# Patient Record
Sex: MALE | Race: BLACK OR AFRICAN AMERICAN | Employment: OTHER | URBAN - METROPOLITAN AREA
[De-identification: names, ages, dates, MRNs, and addresses within clinical notes are randomized per-mention and may not be internally consistent; named-entity substitution may affect disease eponyms.]

---

## 2018-07-11 ENCOUNTER — HOSPITAL ENCOUNTER (OUTPATIENT)
Dept: SPEECH THERAPY | Age: 43
Discharge: HOME | End: 2018-07-11
Attending: PEDIATRICS | Admitting: PEDIATRICS

## 2018-07-11 DIAGNOSIS — R13.10 PROBLEMS WITH SWALLOWING AND MASTICATION: ICD-10-CM

## 2018-07-11 NOTE — PROCEDURES
INSTRUMENTAL SWALLOW REPORT  MODIFIED BARIUM SWALLOW    NAME: Jason Yousif   : 1975  MRN: [id number]       Date of Eval: 2018     Ordering Physician: Dr. Monica Loyola  Radiologist: Dr. Ginette Bustamante     Referring Diagnosis(es): Referring Diagnosis: Dysphagia, unspecified type (R13.10)    Past Medical History:  has a past medical history of Acne; Allergic rhinitis; Blindness, legal; Constipation; Dehydration; Dermatitis; Diabetes mellitus ([address] Epilepsy ([address] GERD (gastroesophageal reflux disease); GERD (gastroesophageal reflux disease); Hearing loss; Hypokalemia; Hypothyroidism; Impulse control disorder; Incontinence; Insomnia; Mental retardation; Mental retardation, profound (I.Q. < 20); Microcephaly ([facility] [address] MRSA (methicillin resistant staph aureus) culture positive; Non-verbal learning disorder; PAD (peripheral artery disease) ([facility] [address] Pneumonia; Scoliosis; and Thyroid disease. Past Surgical History:  has a past surgical history that includes chest tube insertion and other surgical history (Right, 1-7-16). Current Diet Solid Consistency: Dysphagia I Pureed  Current Diet Liquid Consistency: Nectar    Date of Prior Study: 12  Type of Study: Repeat MBS  Results of Prior Study: Dysphagia I (puree) diet with nectar-thick liquids recommended; aspiration with thin liquid observed. Recent CXR/CT of Chest: n/a    Patient Complaints/Reason for Referral:  Jason Yousif was referred for a MBS to assess the efficiency of his/her swallow function, assess for aspiration, and to make recommendations regarding safe dietary consistencies, effective compensatory strategies, and safe eating environment. Patient complaints: n/a; per pt's caregiver, pt tolerating current diet, however, \"has been having more seizures lately\".     Pain   Patient Currently in Pain: Unable to Assess (Pt nonverbal; no indicators of pain during study    General Comment: Pt with hx of epilepsy, blindness, profound MR, PNA, and GERD per chart. Recommended Diet:  Solid consistency: Dysphagia I Pureed (Recommend trialing mechanical soft consistencies with SLP only)  Liquid consistency: Nectar  Liquid administration via: Spoon; Only tsp trials able to be assessed this date  Medication administration: Meds in puree    Impressions:  Treatment Dx and ICD 10: Dysphagia, unspecified type (R13.10)  Pt demonstrates moderate-severe pharyngeal dysphagia  · Pt's oral deficits characterized by reduced bolus control and weak lingual manipulation, resulting in premature bolus loss to the pharynx with all consistencies trialed. Pt with minimal-absent rotary chew pattern with regular solid trial.  Pt may benefit from trialing mechanical soft consistencies at bedside with SLP only for possible diet upgrade. · Pt's pharyngeal deficits characterized by delayed swallow initiation, reduced laryngeal elevation, and reduced anterior laryngeal movement with premature spillage to the pyriforms noted with nectar and honey-thick liquid trials via tsp (x1 each). No penetration/aspiration observed with any consistencies trialed. Minimal-no residue noted post-swallow with any consistencies trialed. · Overall limited study 2/2 pt cooperation and minimal PO trials able to be observed. Pt with frequent movement throughout study despite tactile / verbal cueing from SLP. Pt did not accept liquid trials from cup or straw, pushing both away from SLP despite multiple attempts / encouragement. Pt will require total feed assistance and adherence to strict aspiration precautions. Oral Preparation / Oral Phase  Impaired  Oral Phase - Major Contributing Deficits  · Poor Mastication: Reg solid; Soft solid  · Weak Lingual Manipulation: All  · Reduced Bolus Control: All  · Premature Bolus Loss to Pharynx: All    Pharyngeal Phase  Impaired  Pharyngeal Phase - Major Contributing Deficits  · Delayed Swallow Initiation: All  · Premature Spillage to Valleculae:  All  · Premature Spillage Yes  Type of devices:  (Pt left MBSS in no distress; left with caregiver)    Goals: To be determined by treating SLP    G-Code:  SLP G-Codes  Functional Limitations: Swallowing  Swallow Current Status (): At least 60 percent but less than 80 percent impaired, limited or restricted  Swallow Goal Status (): At least 60 percent but less than 80 percent impaired, limited or restricted  Swallow Discharge Status ():  At least 60 percent but less than 80 percent impaired, limited or restricted      Therapy Time:   Individual Concurrent Group Co-treatment   Time In 1035         Time Out 1100         Minutes 25             25 minutes: MBSS procedure and dysphagia tx    Joanna Salazar M.S. [id number] [facility]  Speech-language pathologist  [id number]

## 2018-07-11 NOTE — COMMUNICATION BODY
INSTRUMENTAL SWALLOW REPORT  MODIFIED BARIUM SWALLOW     NAME: Dino Madrid                                  : 1975  MRN: [id number]                                  Date of Eval: 2018                                   Ordering Physician: Dr. Darryl Cortez  Radiologist: Dr. Nithin Velez  Referring Diagnosis(es): Referring Diagnosis: Dysphagia, unspecified type (R13.10)     Past Medical History:  has a past medical history of Acne; Allergic rhinitis; Blindness, legal; Constipation; Dehydration; Dermatitis; Diabetes mellitus ([facility] [address] Epilepsy ([facility] [address] GERD (gastroesophageal reflux disease); GERD (gastroesophageal reflux disease); Hearing loss; Hypokalemia; Hypothyroidism; Impulse control disorder; Incontinence; Insomnia; Mental retardation; Mental retardation, profound (I.Q. < 20); Microcephaly ([facility] [address] MRSA (methicillin resistant staph aureus) culture positive; Non-verbal learning disorder; PAD (peripheral artery disease) ([facility] [address] Pneumonia; Scoliosis; and Thyroid disease. Past Surgical History:  has a past surgical history that includes chest tube insertion and other surgical history (Right, 1-7-16).    Current Diet Solid Consistency: Dysphagia I Pureed  Current Diet Liquid Consistency: Nectar     Date of Prior Study: 12  Type of Study: Repeat MBS  Results of Prior Study: Dysphagia I (puree) diet with nectar-thick liquids recommended; aspiration with thin liquid observed.     Recent CXR/CT of Chest: n/a     Patient Complaints/Reason for Referral:  Dino Madrid was referred for a MBS to assess the efficiency of his/her swallow function, assess for aspiration, and to make recommendations regarding safe dietary consistencies, effective compensatory strategies, and safe eating environment.   Patient complaints: n/a; per pt's caregiver, pt tolerating current diet, however, \"has been having more seizures lately\".     Pain   Patient Currently in Pain: Unable to Assess (Pt nonverbal; no indicators of pain during Deficits  · Delayed Swallow Initiation: All  · Premature Spillage to Valleculae: All  · Premature Spillage to Pyriform: Nectar teaspoon;Honey teaspoon  · Reduced Laryngeal Elevation: All  · Reduced Anterior Laryngeal Movement: All     Esophageal Phase  Appears WFL when viewed at the cervical level throughout the duration of the study     Patient Position: Lateral and Patient Degrees: Pt seated upright in wheelchair  Consistencies Administered: Soft solid;Reg solid;Puree;Nectar  teaspoon;Honey teaspoon     Dysphagia Outcome Severity Scale: Level 3: Moderate dysphagia- Total assisstance, supervision or strategies. Two or more diet consistencies restricted  Penetration-Aspiration Scale (PAS): 1 - Material does not enter the airway     Behavior/Cognition/Vision/Hearing:  Behavior/Cognition: Alert;Agitated; Impulsive; Uncooperative; Requires cueing;Doesn't follow directions  Vision: Impaired  Vision Exceptions: Legally blind (\"Blindness\" per chart)  Hearing: Exceptions to [facility] (MAKENZIE; pt unable to follow commands for SLP, nonverbal)     Safe Swallow Protocol:  Supervision: 1:1  Compensatory Swallowing Strategies: Total feed;Remain upright for 30-45 minutes after meals;Upright as possible for all oral intake;Eat/Feed slowly; Small bites/sips     Recommendations/Treatment  Requires SLP Intervention: Yes  D/C Recommendations: 24 hour supervision/assistance  Recommended Exercises:    Therapeutic Interventions: Diet tolerance monitoring; Therapeutic PO trials with SLP;Patient/Family education;Oral care     Education: Images and recommendations were reviewed with pt, pt's caregiver from group home following this exam.   Patient Education: Pt educated on MBSS procedure, nature of oropharyngeal deficits, assessment results and recommendations.   Patient Education Response: No evidence of learning (Pt's caregiver present for study verbalized understanding)     Prognosis  Prognosis for safe diet advancement: fair  Barriers to reach goals:

## 2018-07-11 NOTE — LETTER
Meliton Min [id number]  Phone: [address] [place] [address], [facility]    2018     Patient: Margret Anaya   MR Number: [id number]   YOB: 1975   Date of Visit: 2018       [facility],     Thank you for referring [place] to me for evaluation. Below are the relevant portions of my assessment and plan of care. If you have questions, please do not hesitate to call me. Sincerely,    James Kraft M.S. [id number] [facility]  Speech-language pathologist  [id number]  Speech Desk Phone: [phone]                                                INSTRUMENTAL SWALLOW REPORT  MODIFIED BARIUM SWALLOW     NAME: Ova Shoulders                                  : 1975  MRN: [id number]                                  Date of Eval: 2018                                   Ordering Physician: Dr. Elizabeth Obrien  Radiologist: Dr. Manjit Watson  Referring Diagnosis(es): Referring Diagnosis: Dysphagia, unspecified type (R13.10)     Past Medical History:  has a past medical history of Acne; Allergic rhinitis; Blindness, legal; Constipation; Dehydration; Dermatitis; Diabetes mellitus ([address] Epilepsy ([address] GERD (gastroesophageal reflux disease); GERD (gastroesophageal reflux disease); Hearing loss; Hypokalemia; Hypothyroidism; Impulse control disorder; Incontinence; Insomnia; Mental retardation; Mental retardation, profound (I.Q. < 20); Microcephaly ([address] MRSA (methicillin resistant staph aureus) culture positive; Non-verbal learning disorder; PAD (peripheral artery disease) ([address] Pneumonia; Scoliosis; and Thyroid disease. Past Surgical History:  has a past surgical history that includes chest tube insertion and other surgical history (Right, 116).      Current Diet Solid Consistency: Dysphagia I Pureed  Current Diet Liquid Consistency: Nectar     Date of Prior Study: 12  Type of Study: Repeat MBS Results of Prior Study: Dysphagia I (puree) diet with nectar-thick liquids recommended; aspiration with thin liquid observed.     Recent CXR/CT of Chest: n/a     Patient Complaints/Reason for Referral:  Vivien Quinones was referred for a MBS to assess the efficiency of his/her swallow function, assess for aspiration, and to make recommendations regarding safe dietary consistencies, effective compensatory strategies, and safe eating environment. Patient complaints: n/a; per pt's caregiver, pt tolerating current diet, however, \"has been having more seizures lately\".     Pain   Patient Currently in Pain: Unable to Assess (Pt nonverbal; no indicators of pain during study     General Comment: Pt with hx of epilepsy, blindness, profound MR, PNA, and GERD per chart. Recommended Diet:  Solid consistency: Dysphagia I Pureed (Recommend trialing mechanical soft consistencies with SLP only)  Liquid consistency: Nectar  Liquid administration via: Spoon; Only tsp trials able to be assessed this date  Medication administration: Meds in puree     Impressions:  Treatment Dx and ICD 10: Dysphagia, unspecified type (R13.10)  Pt demonstrates moderate-severe pharyngeal dysphagia  · Pt's oral deficits characterized by reduced bolus control and weak lingual manipulation, resulting in premature bolus loss to the pharynx with all consistencies trialed. Pt with minimal-absent rotary chew pattern with regular solid trial.  Pt may benefit from trialing mechanical soft consistencies at bedside with SLP only for possible diet upgrade. · Pt's pharyngeal deficits characterized by delayed swallow initiation, reduced laryngeal elevation, and reduced anterior laryngeal movement with premature spillage to the pyriforms noted with nectar and honey-thick liquid trials via tsp (x1 each). No penetration/aspiration observed with any consistencies trialed. Minimal-no residue noted post-swallow with any consistencies trialed.

## 2018-11-03 ENCOUNTER — APPOINTMENT (OUTPATIENT)
Dept: CT IMAGING | Age: 43
DRG: 426 | End: 2018-11-03
Payer: MEDICAID

## 2018-11-03 ENCOUNTER — HOSPITAL ENCOUNTER (INPATIENT)
Age: 43
LOS: 3 days | Discharge: HOME | DRG: 426 | End: 2018-11-06
Attending: EMERGENCY MEDICINE | Admitting: INTERNAL MEDICINE
Payer: MEDICAID

## 2018-11-03 ENCOUNTER — APPOINTMENT (OUTPATIENT)
Dept: GENERAL RADIOLOGY | Age: 43
DRG: 426 | End: 2018-11-03
Payer: MEDICAID

## 2018-11-03 DIAGNOSIS — E87.0 ACUTE HYPERNATREMIA: Primary | ICD-10-CM

## 2018-11-03 LAB
A/G RATIO: 0.9 (ref 1.1–2.2)
ALBUMIN SERPL-MCNC: 3.4 G/DL (ref 3.4–5)
ALP BLD-CCNC: 219 U/L (ref 40–129)
ALT SERPL-CCNC: 24 U/L (ref 10–40)
ANION GAP SERPL CALCULATED.3IONS-SCNC: 8 MMOL/L (ref 3–16)
AST SERPL-CCNC: 27 U/L (ref 15–37)
BASOPHILS ABSOLUTE: 0.1 K/UL (ref 0–0.2)
BASOPHILS RELATIVE PERCENT: 0.6 %
BILIRUB SERPL-MCNC: <0.2 MG/DL (ref 0–1)
BILIRUBIN URINE: NEGATIVE
BLOOD, URINE: NEGATIVE
BUN BLDV-MCNC: 28 MG/DL (ref 7–20)
CALCIUM SERPL-MCNC: 9 MG/DL (ref 8.3–10.6)
CARBAMAZEPINE DOSE: NORMAL
CARBAMAZEPINE LEVEL: 6 UG/ML (ref 4–12)
CHLORIDE BLD-SCNC: 132 MMOL/L (ref 99–110)
CLARITY: CLEAR
CO2: 26 MMOL/L (ref 21–32)
COLOR: YELLOW
CREAT SERPL-MCNC: 1.3 MG/DL (ref 0.9–1.3)
EKG ATRIAL RATE: 96 BPM
EKG DIAGNOSIS: NORMAL
EKG P AXIS: 72 DEGREES
EKG P-R INTERVAL: 146 MS
EKG Q-T INTERVAL: 372 MS
EKG QRS DURATION: 72 MS
EKG QTC CALCULATION (BAZETT): 469 MS
EKG R AXIS: 86 DEGREES
EKG T AXIS: 70 DEGREES
EKG VENTRICULAR RATE: 96 BPM
EOSINOPHILS ABSOLUTE: 0.1 K/UL (ref 0–0.6)
EOSINOPHILS RELATIVE PERCENT: 1.1 %
GFR AFRICAN AMERICAN: >60
GFR NON-AFRICAN AMERICAN: >60
GLOBULIN: 3.6 G/DL
GLUCOSE BLD-MCNC: 109 MG/DL (ref 70–99)
GLUCOSE BLD-MCNC: 237 MG/DL (ref 70–99)
GLUCOSE BLD-MCNC: 99 MG/DL (ref 70–99)
GLUCOSE URINE: NEGATIVE MG/DL
HCT VFR BLD CALC: 46 % (ref 40.5–52.5)
HEMOGLOBIN: 14.7 G/DL (ref 13.5–17.5)
KETONES, URINE: NEGATIVE MG/DL
LACTIC ACID: 1.3 MMOL/L (ref 0.4–2)
LEUKOCYTE ESTERASE, URINE: NEGATIVE
LYMPHOCYTES ABSOLUTE: 2.8 K/UL (ref 1–5.1)
LYMPHOCYTES RELATIVE PERCENT: 27.9 %
MCH RBC QN AUTO: 30.2 PG (ref 26–34)
MCHC RBC AUTO-ENTMCNC: 31.9 G/DL (ref 31–36)
MCV RBC AUTO: 94.7 FL (ref 80–100)
MICROSCOPIC EXAMINATION: YES
MONOCYTES ABSOLUTE: 0.5 K/UL (ref 0–1.3)
MONOCYTES RELATIVE PERCENT: 4.8 %
MUCUS: ABNORMAL /LPF
NEUTROPHILS ABSOLUTE: 6.5 K/UL (ref 1.7–7.7)
NEUTROPHILS RELATIVE PERCENT: 65.6 %
NITRITE, URINE: NEGATIVE
PDW BLD-RTO: 15.5 % (ref 12.4–15.4)
PERFORMED ON: ABNORMAL
PERFORMED ON: NORMAL
PH UA: 7
PLATELET # BLD: 130 K/UL (ref 135–450)
PLATELET SLIDE REVIEW: ADEQUATE
PMV BLD AUTO: 10 FL (ref 5–10.5)
POTASSIUM SERPL-SCNC: 3.6 MMOL/L (ref 3.5–5.1)
PROCALCITONIN: 0.09 NG/ML (ref 0–0.15)
PROTEIN UA: ABNORMAL MG/DL
RAPID INFLUENZA  B AGN: NEGATIVE
RAPID INFLUENZA A AGN: NEGATIVE
RBC # BLD: 4.86 M/UL (ref 4.2–5.9)
RBC UA: ABNORMAL /HPF (ref 0–2)
SLIDE REVIEW: ABNORMAL
SODIUM BLD-SCNC: 165 MMOL/L (ref 136–145)
SODIUM BLD-SCNC: 165 MMOL/L (ref 136–145)
SODIUM BLD-SCNC: 166 MMOL/L (ref 136–145)
SPECIFIC GRAVITY UA: 1.02
TOTAL PROTEIN: 7 G/DL (ref 6.4–8.2)
TSH REFLEX: 1.23 UIU/ML (ref 0.27–4.2)
URINE REFLEX TO CULTURE: ABNORMAL
URINE TYPE: ABNORMAL
UROBILINOGEN, URINE: 1 E.U./DL
WBC # BLD: 10 K/UL (ref 4–11)
WBC UA: ABNORMAL /HPF (ref 0–5)

## 2018-11-03 PROCEDURE — 93010 ELECTROCARDIOGRAM REPORT: CPT | Performed by: INTERNAL MEDICINE

## 2018-11-03 PROCEDURE — 96374 THER/PROPH/DIAG INJ IV PUSH: CPT

## 2018-11-03 PROCEDURE — 80156 ASSAY CARBAMAZEPINE TOTAL: CPT

## 2018-11-03 PROCEDURE — 6360000002 HC RX W HCPCS: Performed by: INTERNAL MEDICINE

## 2018-11-03 PROCEDURE — 84295 ASSAY OF SERUM SODIUM: CPT

## 2018-11-03 PROCEDURE — 2580000003 HC RX 258: Performed by: EMERGENCY MEDICINE

## 2018-11-03 PROCEDURE — 87040 BLOOD CULTURE FOR BACTERIA: CPT

## 2018-11-03 PROCEDURE — 83605 ASSAY OF LACTIC ACID: CPT

## 2018-11-03 PROCEDURE — 99285 EMERGENCY DEPT VISIT HI MDM: CPT

## 2018-11-03 PROCEDURE — 2580000003 HC RX 258: Performed by: INTERNAL MEDICINE

## 2018-11-03 PROCEDURE — 80053 COMPREHEN METABOLIC PANEL: CPT

## 2018-11-03 PROCEDURE — 70450 CT HEAD/BRAIN W/O DYE: CPT

## 2018-11-03 PROCEDURE — 84443 ASSAY THYROID STIM HORMONE: CPT

## 2018-11-03 PROCEDURE — 6360000002 HC RX W HCPCS: Performed by: PHYSICIAN ASSISTANT

## 2018-11-03 PROCEDURE — 85025 COMPLETE CBC W/AUTO DIFF WBC: CPT

## 2018-11-03 PROCEDURE — 36415 COLL VENOUS BLD VENIPUNCTURE: CPT

## 2018-11-03 PROCEDURE — 71045 X-RAY EXAM CHEST 1 VIEW: CPT

## 2018-11-03 PROCEDURE — 93005 ELECTROCARDIOGRAM TRACING: CPT | Performed by: PHYSICIAN ASSISTANT

## 2018-11-03 PROCEDURE — 81001 URINALYSIS AUTO W/SCOPE: CPT

## 2018-11-03 PROCEDURE — 2000000000 HC ICU R&B

## 2018-11-03 PROCEDURE — 51702 INSERT TEMP BLADDER CATH: CPT

## 2018-11-03 PROCEDURE — 84145 PROCALCITONIN (PCT): CPT

## 2018-11-03 PROCEDURE — 96361 HYDRATE IV INFUSION ADD-ON: CPT

## 2018-11-03 PROCEDURE — 87804 INFLUENZA ASSAY W/OPTIC: CPT

## 2018-11-03 PROCEDURE — 2580000003 HC RX 258: Performed by: PHYSICIAN ASSISTANT

## 2018-11-03 RX ORDER — LEVOTHYROXINE SODIUM ANHYDROUS 100 UG/5ML
37.5 INJECTION, POWDER, LYOPHILIZED, FOR SOLUTION INTRAVENOUS DAILY
Status: DISCONTINUED | OUTPATIENT
Start: 2018-11-03 | End: 2018-11-06

## 2018-11-03 RX ORDER — LAMOTRIGINE 100 MG/1
400 TABLET ORAL 2 TIMES DAILY
Status: DISCONTINUED | OUTPATIENT
Start: 2018-11-03 | End: 2018-11-06 | Stop reason: HOSPADM

## 2018-11-03 RX ORDER — DEXTROSE MONOHYDRATE 50 MG/ML
INJECTION, SOLUTION INTRAVENOUS CONTINUOUS
Status: DISCONTINUED | OUTPATIENT
Start: 2018-11-03 | End: 2018-11-04

## 2018-11-03 RX ORDER — SIMETHICONE 80 MG
80 TABLET,CHEWABLE ORAL EVERY 6 HOURS PRN
Status: ON HOLD | COMMUNITY
End: 2018-12-11 | Stop reason: SDUPTHER

## 2018-11-03 RX ORDER — INSULIN GLARGINE 100 [IU]/ML
12 INJECTION, SOLUTION SUBCUTANEOUS EVERY MORNING
Status: DISCONTINUED | OUTPATIENT
Start: 2018-11-04 | End: 2018-11-03

## 2018-11-03 RX ORDER — ZONISAMIDE 100 MG/1
400 CAPSULE ORAL DAILY
Status: DISCONTINUED | OUTPATIENT
Start: 2018-11-03 | End: 2018-11-06 | Stop reason: HOSPADM

## 2018-11-03 RX ORDER — PANTOPRAZOLE SODIUM 40 MG/1
40 TABLET, DELAYED RELEASE ORAL
Status: DISCONTINUED | OUTPATIENT
Start: 2018-11-04 | End: 2018-11-06 | Stop reason: HOSPADM

## 2018-11-03 RX ORDER — LORAZEPAM 2 MG/ML
1 INJECTION INTRAMUSCULAR ONCE
Status: COMPLETED | OUTPATIENT
Start: 2018-11-03 | End: 2018-11-03

## 2018-11-03 RX ORDER — POLYETHYLENE GLYCOL 3350 17 G/17G
17 POWDER, FOR SOLUTION ORAL EVERY OTHER DAY
Status: DISCONTINUED | OUTPATIENT
Start: 2018-11-03 | End: 2018-11-06 | Stop reason: HOSPADM

## 2018-11-03 RX ORDER — DEXTROSE MONOHYDRATE 50 MG/ML
100 INJECTION, SOLUTION INTRAVENOUS PRN
Status: DISCONTINUED | OUTPATIENT
Start: 2018-11-03 | End: 2018-11-06 | Stop reason: HOSPADM

## 2018-11-03 RX ORDER — SODIUM CHLORIDE 9 MG/ML
INJECTION, SOLUTION INTRAVENOUS
Status: DISPENSED
Start: 2018-11-03 | End: 2018-11-04

## 2018-11-03 RX ORDER — NICOTINE POLACRILEX 4 MG
15 LOZENGE BUCCAL PRN
Status: DISCONTINUED | OUTPATIENT
Start: 2018-11-03 | End: 2018-11-06 | Stop reason: HOSPADM

## 2018-11-03 RX ORDER — ASPIRIN 81 MG/1
81 TABLET, CHEWABLE ORAL DAILY
Status: DISCONTINUED | OUTPATIENT
Start: 2018-11-03 | End: 2018-11-06 | Stop reason: HOSPADM

## 2018-11-03 RX ORDER — SODIUM CHLORIDE 0.9 % (FLUSH) 0.9 %
10 SYRINGE (ML) INJECTION EVERY 12 HOURS SCHEDULED
Status: DISCONTINUED | OUTPATIENT
Start: 2018-11-03 | End: 2018-11-06 | Stop reason: HOSPADM

## 2018-11-03 RX ORDER — ONDANSETRON 2 MG/ML
4 INJECTION INTRAMUSCULAR; INTRAVENOUS EVERY 6 HOURS PRN
Status: DISCONTINUED | OUTPATIENT
Start: 2018-11-03 | End: 2018-11-06 | Stop reason: HOSPADM

## 2018-11-03 RX ORDER — DOCUSATE SODIUM 100 MG/1
100 CAPSULE, LIQUID FILLED ORAL 2 TIMES DAILY
Status: DISCONTINUED | OUTPATIENT
Start: 2018-11-03 | End: 2018-11-06 | Stop reason: HOSPADM

## 2018-11-03 RX ORDER — 0.9 % SODIUM CHLORIDE 0.9 %
1000 INTRAVENOUS SOLUTION INTRAVENOUS ONCE
Status: COMPLETED | OUTPATIENT
Start: 2018-11-03 | End: 2018-11-03

## 2018-11-03 RX ORDER — SODIUM CHLORIDE 9 MG/ML
INJECTION, SOLUTION INTRAVENOUS CONTINUOUS
Status: DISCONTINUED | OUTPATIENT
Start: 2018-11-03 | End: 2018-11-03

## 2018-11-03 RX ORDER — DIAZEPAM 10 MG/2ML
10 GEL RECTAL PRN
Status: DISCONTINUED | OUTPATIENT
Start: 2018-11-03 | End: 2018-11-06 | Stop reason: HOSPADM

## 2018-11-03 RX ORDER — OMEGA-3S/DHA/EPA/FISH OIL/D3 300MG-1000
400 CAPSULE ORAL DAILY
Status: DISCONTINUED | OUTPATIENT
Start: 2018-11-03 | End: 2018-11-06 | Stop reason: HOSPADM

## 2018-11-03 RX ORDER — LORAZEPAM 2 MG/ML
1 INJECTION INTRAMUSCULAR PRN
Status: DISCONTINUED | OUTPATIENT
Start: 2018-11-03 | End: 2018-11-04

## 2018-11-03 RX ORDER — CARBAMAZEPINE 200 MG/1
400 TABLET, EXTENDED RELEASE ORAL 2 TIMES DAILY
Status: DISCONTINUED | OUTPATIENT
Start: 2018-11-03 | End: 2018-11-06 | Stop reason: HOSPADM

## 2018-11-03 RX ORDER — DOCUSATE SODIUM 100 MG/1
100 CAPSULE, LIQUID FILLED ORAL 2 TIMES DAILY
Status: ON HOLD | COMMUNITY
End: 2018-12-13 | Stop reason: HOSPADM

## 2018-11-03 RX ORDER — ACETAMINOPHEN 650 MG/1
650 SUPPOSITORY RECTAL EVERY 4 HOURS PRN
Status: DISCONTINUED | OUTPATIENT
Start: 2018-11-03 | End: 2018-11-06 | Stop reason: HOSPADM

## 2018-11-03 RX ORDER — DEXTROSE MONOHYDRATE 25 G/50ML
12.5 INJECTION, SOLUTION INTRAVENOUS PRN
Status: DISCONTINUED | OUTPATIENT
Start: 2018-11-03 | End: 2018-11-06 | Stop reason: HOSPADM

## 2018-11-03 RX ORDER — SODIUM CHLORIDE 0.9 % (FLUSH) 0.9 %
10 SYRINGE (ML) INJECTION PRN
Status: DISCONTINUED | OUTPATIENT
Start: 2018-11-03 | End: 2018-11-06 | Stop reason: HOSPADM

## 2018-11-03 RX ORDER — LAMOTRIGINE 100 MG/1
50 TABLET ORAL NIGHTLY
Status: DISCONTINUED | OUTPATIENT
Start: 2018-11-03 | End: 2018-11-06 | Stop reason: HOSPADM

## 2018-11-03 RX ORDER — SIMETHICONE 80 MG
80 TABLET,CHEWABLE ORAL 4 TIMES DAILY PRN
Status: DISCONTINUED | OUTPATIENT
Start: 2018-11-03 | End: 2018-11-06 | Stop reason: HOSPADM

## 2018-11-03 RX ORDER — GABAPENTIN 300 MG/1
300 CAPSULE ORAL 4 TIMES DAILY
Status: DISCONTINUED | OUTPATIENT
Start: 2018-11-03 | End: 2018-11-06 | Stop reason: HOSPADM

## 2018-11-03 RX ADMIN — LEVETIRACETAM 1000 MG: 100 INJECTION, SOLUTION INTRAVENOUS at 18:36

## 2018-11-03 RX ADMIN — ENOXAPARIN SODIUM 40 MG: 40 INJECTION SUBCUTANEOUS at 16:45

## 2018-11-03 RX ADMIN — SODIUM CHLORIDE 1000 ML: 9 INJECTION, SOLUTION INTRAVENOUS at 14:14

## 2018-11-03 RX ADMIN — LORAZEPAM 1 MG: 2 INJECTION INTRAMUSCULAR; INTRAVENOUS at 12:46

## 2018-11-03 RX ADMIN — SODIUM CHLORIDE 1000 ML: 9 INJECTION, SOLUTION INTRAVENOUS at 11:03

## 2018-11-03 RX ADMIN — DEXTROSE MONOHYDRATE: 50 INJECTION, SOLUTION INTRAVENOUS at 16:45

## 2018-11-03 RX ADMIN — Medication 10 ML: at 20:49

## 2018-11-03 NOTE — ED PROVIDER NOTES
DIFFERENTIALDIAGNOSIS/MDM:   Vitals:    Vitals:    11/03/18 1302 11/03/18 1355 11/03/18 1545 11/03/18 1703   BP: (!) 130/101 127/84 (!) 139/102 (!) 145/87   Pulse: 88 87 86 81   Resp: 18 18 15 11   Temp:   97.1 °F (36.2 °C)    TempSrc:   Axillary    SpO2: 100% 100% 100% 100%   Weight:   108 lb 11 oz (49.3 kg)        Patient was given thefollowing medications:  Medications   acetaminophen (TYLENOL) suppository 650 mg (not administered)   aspirin chewable tablet 81 mg (81 mg Oral Not Given 11/3/18 1742)   carBAMazepine (TEGRETOL XR) extended release tablet 400 mg (not administered)   vitamin D3 (CHOLECALCIFEROL) tablet 400 Units (400 Units Oral Not Given 11/3/18 1743)   diazepam (DIASTAT) rectal gel 10 mg (not administered)   docusate sodium (COLACE) capsule 100 mg (not administered)   gabapentin (NEURONTIN) capsule 300 mg (300 mg Oral Not Given 11/3/18 1742)   lamoTRIgine (LAMICTAL) tablet 400 mg (not administered)   lamoTRIgine (LAMICTAL) tablet 50 mg (not administered)   pantoprazole (PROTONIX) tablet 40 mg (not administered)   polyethylene glycol (GLYCOLAX) powder 17 g (17 g Oral Not Given 11/3/18 1743)   sertraline (ZOLOFT) tablet 50 mg (not administered)   simethicone (MYLICON) chewable tablet 80 mg (not administered)   zonisamide (ZONEGRAN) capsule 400 mg (400 mg Oral Not Given 11/3/18 1742)   sodium chloride flush 0.9 % injection 10 mL (not administered)   sodium chloride flush 0.9 % injection 10 mL (not administered)   magnesium hydroxide (MILK OF MAGNESIA) 400 MG/5ML suspension 30 mL (not administered)   ondansetron (ZOFRAN) injection 4 mg (not administered)   enoxaparin (LOVENOX) injection 40 mg (40 mg Subcutaneous Given 11/3/18 1645)   dextrose 5 % solution ( Intravenous New Bag 11/3/18 1645)   insulin lispro (HUMALOG) injection vial 0-6 Units (0 Units Subcutaneous Not Given 11/3/18 9774)   glucose (GLUTOSE) 40 % oral gel 15 g (not administered)   dextrose 50 % solution 12.5 g (not administered)   glucagon

## 2018-11-03 NOTE — H&P
Pneumonia     Scoliosis     Thyroid disease        Past Surgical History:        Procedure Laterality Date    CHEST TUBE INSERTION      upper lobes bilat; when born   [facility] OTHER SURGICAL HISTORY Right 1-7-16    excision thumb mass       Medications Prior to Admission:    Prior to Admission medications    Medication Sig Start Date End Date Taking? Authorizing Provider   docusate sodium (COLACE) 100 MG capsule Take 100 mg by mouth 2 times daily    Historical Provider, MD   simethicone (MYLICON) 80 MG chewable tablet Take 80 mg by mouth every 6 hours as needed for Flatulence    Historical Provider, MD   acetaminophen (TYLENOL) 650 MG suppository Place 650 mg rectally every 4 hours as needed for Fever    Historical Provider, MD   Cholecalciferol 400 UNIT TABS tablet Take 400 Units by mouth daily    Historical Provider, MD   carBAMazepine (CARBATROL) 200 MG CR capsule Take 2 capsules by mouth every 12 hours 1/18/16   Morgan Fonseca MD   diazepam (DIASTAT ACUDIAL) 10 MG GEL Place 10 mg rectally as needed Inject 10mg rectally as needed for seizure lasting longer than 3 minutes.  1/18/16   Morgan Fonseca MD   gabapentin (NEURONTIN) 300 MG capsule Take 1 capsule by mouth 4 times daily 1/18/16   Morgan Fonseca MD   promethazine (PHENERGAN) 25 MG suppository Place 1 suppository rectally every 6 hours as needed 1/18/16   Morgan Fonseca MD   ketoconazole (NIZORAL) 2 % shampoo Apply topically once a week     Historical Provider, MD   lamoTRIgine (LAMICTAL) 200 MG tablet Take 400 mg by mouth 2 times daily     Historical Provider, MD   insulin glargine (LANTUS) 100 UNIT/ML injection vial Inject 12 Units into the skin every morning     Historical Provider, MD   levothyroxine (SYNTHROID) 75 MCG tablet Take 75 mcg by mouth Daily    Historical Provider, MD   insulin aspart (NOVOLOG) 100 UNIT/ML injection pen Inject into the skin 3 times daily (before meals) Sliding scale  7,12,16   3 units  150-199 4 units  200-249  5 Provider, MD       Allergies:  Polymyxin b and Polytrim [polymyxin b-trimethoprim]    Social History:  The patient currently lives at [address]:   reports that he has never smoked. He has never used smokeless tobacco.  ETOH:   reports that he does not drink alcohol. Family History:  Reviewed in detail and negative for DM, Early CAD, Cancer, CVA. Positive as follows:    History reviewed. No pertinent family history. REVIEW OF SYSTEMS:   As noted in the HPI. All other systems reviewed and negative. PHYSICAL EXAM:    BP (!) 145/87   Pulse 81   Temp 97.1 °F (36.2 °C) (Axillary)   Resp 11   Wt 108 lb 11 oz (49.3 kg)   SpO2 100%   BMI 17.02 kg/m²     General appearance: lethargic, non verbal. Muscle wasting diffuse. HEENT Normal cephalic, atraumatic without obvious deformity. Pupils equal, round, and reactive to light. Extra ocular muscles intact. Conjunctivae/corneas clear. Neck: Supple, No jugular venous distention/bruits. Trachea midline without thyromegaly or adenopathy with full range of motion. Lungs: Clear to auscultation, bilaterally without Rales/Wheezes/Rhonchi with good respiratory effort. Heart: Regular rate and rhythm with Normal S1/S2 without murmurs, rubs or gallops, point of maximum impulse non-displaced  Abdomen: Soft, non-tender or non-distended without rigidity or guarding and positive bowel sounds all four quadrants. Extremities: No clubbing, cyanosis, or edema bilaterally. Full range of motion without deformity and normal gait intact. Skin: Skin color, texture, turgor normal.  No rashes or lesions. Neurologic: lethargic, would not follow commands or cooperate with examination.    Capillary Refill: Acceptable  < 3 seconds  Peripheral Pulses: +3 Easily felt, not easily obliterated with pressure        CBC   Recent Labs      11/03/18   1145   WBC  10.0   HGB  14.7   HCT  46.0   PLT  130*      RENAL  Recent Labs      11/03/18   1145  11/03/18   1604   NA  166*  165*   K

## 2018-11-03 NOTE — CONSULTS
needed for seizure lasting longer than 3 minutes. 1 each 0    gabapentin (NEURONTIN) 300 MG capsule Take 1 capsule by mouth 4 times daily 10 capsule 0    ketoconazole (NIZORAL) 2 % shampoo Apply topically once a week       lamoTRIgine (LAMICTAL) 200 MG tablet Take 400 mg by mouth 2 times daily       insulin glargine (LANTUS) 100 UNIT/ML injection vial Inject 12 Units into the skin every morning       levothyroxine (SYNTHROID) 75 MCG tablet Take 75 mcg by mouth Daily      insulin aspart (NOVOLOG) 100 UNIT/ML injection pen Inject into the skin 3 times daily (before meals) Sliding scale  7,12,16   3 units  150-199 4 units  200-249  5 units  250-299  6 units  300-349  7 units  350-399  8 units  400 and above, 8 units and call MD  350-399 5 units  400 or greater call MD      clindamycin (CLEOCIN T) 1 % lotion Apply topically as needed Apply topically 2 times daily.  glucagon 1 MG injection Infuse 1 kit intravenously once If pt is unresponsive and BS less than 50      glucose (GLUTOSE) 40 % GEL Take 15 g by mouth See Admin Instructions if pt is symptomatic and BS less than 50      omeprazole (PRILOSEC) 20 MG capsule Take 20 mg by mouth daily.  lamoTRIgine (LAMICTAL) 25 MG tablet Take 50 mg by mouth nightly       sertraline (ZOLOFT) 50 MG tablet Take 50 mg by mouth nightly.  zonisamide (ZONEGRAN) 100 MG capsule Take 400 mg by mouth daily.  simethicone (MYLICON) 80 MG chewable tablet Take 80 mg by mouth three times daily.  Benzoyl Peroxide-Cleanser (LAVOCLEN-4 ACNE WASH) 4 % KIT Apply  topically daily. Use as directed every morning to acne.  Sodium Phosphates (FLEET ENEMA) 7-19 GM/118ML ENEM Place 118 mLs rectally as needed. Insert 1 rectally as needed for constipation if no BM in 4 Days (x1)       aspirin 81 MG chewable tablet Take 81 mg by mouth daily.  docusate sodium (COLACE) 100 MG capsule Take 100 mg by mouth 2 times daily.         Melatonin 3 MG TABS Take 3 mg by mouth nightly.  polyethylene glycol (GLYCOLAX) powder Take 17 g by mouth every other day.  Acetaminophen (TYLENOL PO) Take  by mouth. 650 po/pr q4hr prn pain/fever       bisacodyl (DULCOLAX) 10 MG suppository Place 10 mg rectally daily as needed.  promethazine (PHENERGAN) 25 MG suppository Place 1 suppository rectally every 6 hours as needed 4 suppository 0       Allergies:  Polymyxin b and Polytrim [polymyxin b-trimethoprim]    Social History:    Social History     Social History    Marital status: Single     Spouse name: N/A    Number of children: N/A    Years of education: N/A     Occupational History    Not on file. Social History Main Topics    Smoking status: Never Smoker    Smokeless tobacco: Never Used    Alcohol use No    Drug use: No    Sexual activity: Not Currently     Other Topics Concern    Not on file     Social History Narrative    No narrative on file       Family History:   History reviewed. No pertinent family history. REVIEW OF SYSTEMS:    Review of Systems  Unable to obtain because of the patient's mental state. PHYSICAL EXAM:    Vitals:    BP (!) 139/102   Pulse 86   Temp 97.1 °F (36.2 °C) (Axillary)   Resp 15   Wt 108 lb 11 oz (49.3 kg)   SpO2 100%   BMI 17.02 kg/m²   No intake/output data recorded. No intake/output data recorded. Physical Exam:  Physical Exam   Constitutional: No distress. HENT:   Head: Atraumatic. Nose: Nose normal.   Eyes: No scleral icterus. Neck: No tracheal deviation present. No thyromegaly present. Cardiovascular: Normal rate and regular rhythm. Exam reveals no gallop and no friction rub. Pulmonary/Chest: Effort normal and breath sounds normal. No respiratory distress. Abdominal: Soft. Bowel sounds are normal. He exhibits no distension. There is no tenderness. Musculoskeletal: He exhibits no edema. Neurological:   lethargic   Skin: Skin is warm. Vitals reviewed.       DATA:    Recent Labs

## 2018-11-04 ENCOUNTER — APPOINTMENT (OUTPATIENT)
Dept: CT IMAGING | Age: 43
DRG: 426 | End: 2018-11-04
Payer: MEDICAID

## 2018-11-04 ENCOUNTER — APPOINTMENT (OUTPATIENT)
Dept: GENERAL RADIOLOGY | Age: 43
DRG: 426 | End: 2018-11-04
Payer: MEDICAID

## 2018-11-04 LAB
ALBUMIN SERPL-MCNC: 3.1 G/DL (ref 3.4–5)
ALP BLD-CCNC: 171 U/L (ref 40–129)
ALT SERPL-CCNC: 21 U/L (ref 10–40)
ANION GAP SERPL CALCULATED.3IONS-SCNC: 7 MMOL/L (ref 3–16)
AST SERPL-CCNC: 30 U/L (ref 15–37)
BASOPHILS ABSOLUTE: 0 K/UL (ref 0–0.2)
BASOPHILS RELATIVE PERCENT: 0.8 %
BILIRUB SERPL-MCNC: 0.4 MG/DL (ref 0–1)
BILIRUBIN DIRECT: <0.2 MG/DL (ref 0–0.3)
BILIRUBIN, INDIRECT: ABNORMAL MG/DL (ref 0–1)
BUN BLDV-MCNC: 19 MG/DL (ref 7–20)
CALCIUM SERPL-MCNC: 8.3 MG/DL (ref 8.3–10.6)
CHLORIDE BLD-SCNC: 123 MMOL/L (ref 99–110)
CO2: 26 MMOL/L (ref 21–32)
CREAT SERPL-MCNC: 1 MG/DL (ref 0.9–1.3)
EOSINOPHILS ABSOLUTE: 0.1 K/UL (ref 0–0.6)
EOSINOPHILS RELATIVE PERCENT: 1.8 %
GFR AFRICAN AMERICAN: >60
GFR NON-AFRICAN AMERICAN: >60
GLUCOSE BLD-MCNC: 115 MG/DL (ref 70–99)
GLUCOSE BLD-MCNC: 117 MG/DL (ref 70–99)
GLUCOSE BLD-MCNC: 122 MG/DL (ref 70–99)
GLUCOSE BLD-MCNC: 128 MG/DL (ref 70–99)
GLUCOSE BLD-MCNC: 134 MG/DL (ref 70–99)
GLUCOSE BLD-MCNC: 213 MG/DL (ref 70–99)
GLUCOSE BLD-MCNC: 217 MG/DL (ref 70–99)
GLUCOSE BLD-MCNC: 79 MG/DL (ref 70–99)
HCT VFR BLD CALC: 42.8 % (ref 40.5–52.5)
HEMOGLOBIN: 13.8 G/DL (ref 13.5–17.5)
LYMPHOCYTES ABSOLUTE: 1.8 K/UL (ref 1–5.1)
LYMPHOCYTES RELATIVE PERCENT: 32.4 %
MAGNESIUM: 2 MG/DL (ref 1.8–2.4)
MCH RBC QN AUTO: 30.4 PG (ref 26–34)
MCHC RBC AUTO-ENTMCNC: 32.2 G/DL (ref 31–36)
MCV RBC AUTO: 94.5 FL (ref 80–100)
MONOCYTES ABSOLUTE: 0.3 K/UL (ref 0–1.3)
MONOCYTES RELATIVE PERCENT: 5.8 %
NEUTROPHILS ABSOLUTE: 3.3 K/UL (ref 1.7–7.7)
NEUTROPHILS RELATIVE PERCENT: 59.2 %
PDW BLD-RTO: 15.2 % (ref 12.4–15.4)
PERFORMED ON: ABNORMAL
PERFORMED ON: NORMAL
PHOSPHORUS: 2.9 MG/DL (ref 2.5–4.9)
PLATELET # BLD: 111 K/UL (ref 135–450)
PMV BLD AUTO: 9.8 FL (ref 5–10.5)
POTASSIUM SERPL-SCNC: 3.2 MMOL/L (ref 3.5–5.1)
RBC # BLD: 4.53 M/UL (ref 4.2–5.9)
SODIUM BLD-SCNC: 150 MMOL/L (ref 136–145)
SODIUM BLD-SCNC: 156 MMOL/L (ref 136–145)
SODIUM BLD-SCNC: 156 MMOL/L (ref 136–145)
SODIUM BLD-SCNC: 157 MMOL/L (ref 136–145)
SODIUM BLD-SCNC: 160 MMOL/L (ref 136–145)
TOTAL PROTEIN: 6.3 G/DL (ref 6.4–8.2)
WBC # BLD: 5.7 K/UL (ref 4–11)

## 2018-11-04 PROCEDURE — 71045 X-RAY EXAM CHEST 1 VIEW: CPT

## 2018-11-04 PROCEDURE — 99222 1ST HOSP IP/OBS MODERATE 55: CPT | Performed by: SURGERY

## 2018-11-04 PROCEDURE — 2580000003 HC RX 258: Performed by: INTERNAL MEDICINE

## 2018-11-04 PROCEDURE — 6370000000 HC RX 637 (ALT 250 FOR IP): Performed by: INTERNAL MEDICINE

## 2018-11-04 PROCEDURE — 80076 HEPATIC FUNCTION PANEL: CPT

## 2018-11-04 PROCEDURE — 84295 ASSAY OF SERUM SODIUM: CPT

## 2018-11-04 PROCEDURE — 6360000004 HC RX CONTRAST MEDICATION: Performed by: INTERNAL MEDICINE

## 2018-11-04 PROCEDURE — 6360000002 HC RX W HCPCS: Performed by: INTERNAL MEDICINE

## 2018-11-04 PROCEDURE — 74177 CT ABD & PELVIS W/CONTRAST: CPT

## 2018-11-04 PROCEDURE — 99255 IP/OBS CONSLTJ NEW/EST HI 80: CPT | Performed by: INTERNAL MEDICINE

## 2018-11-04 PROCEDURE — 36415 COLL VENOUS BLD VENIPUNCTURE: CPT

## 2018-11-04 PROCEDURE — 2500000003 HC RX 250 WO HCPCS: Performed by: INTERNAL MEDICINE

## 2018-11-04 PROCEDURE — 2000000000 HC ICU R&B

## 2018-11-04 PROCEDURE — 80048 BASIC METABOLIC PNL TOTAL CA: CPT

## 2018-11-04 PROCEDURE — 74176 CT ABD & PELVIS W/O CONTRAST: CPT

## 2018-11-04 PROCEDURE — 83735 ASSAY OF MAGNESIUM: CPT

## 2018-11-04 PROCEDURE — 84100 ASSAY OF PHOSPHORUS: CPT

## 2018-11-04 PROCEDURE — 85025 COMPLETE CBC W/AUTO DIFF WBC: CPT

## 2018-11-04 PROCEDURE — 36430 TRANSFUSION BLD/BLD COMPNT: CPT

## 2018-11-04 RX ORDER — POTASSIUM CHLORIDE 7.45 MG/ML
20 INJECTION INTRAVENOUS ONCE
Status: DISCONTINUED | OUTPATIENT
Start: 2018-11-04 | End: 2018-11-04 | Stop reason: SDUPTHER

## 2018-11-04 RX ORDER — DEXTROSE MONOHYDRATE 50 MG/ML
INJECTION, SOLUTION INTRAVENOUS CONTINUOUS
Status: DISCONTINUED | OUTPATIENT
Start: 2018-11-04 | End: 2018-11-04

## 2018-11-04 RX ORDER — SODIUM PHOSPHATE, DIBASIC AND SODIUM PHOSPHATE, MONOBASIC 7; 19 G/133ML; G/133ML
ENEMA RECTAL
Status: DISPENSED
Start: 2018-11-04 | End: 2018-11-05

## 2018-11-04 RX ORDER — POTASSIUM CHLORIDE 7.45 MG/ML
10 INJECTION INTRAVENOUS
Status: COMPLETED | OUTPATIENT
Start: 2018-11-04 | End: 2018-11-04

## 2018-11-04 RX ORDER — POTASSIUM CHLORIDE 750 MG/1
20 TABLET, EXTENDED RELEASE ORAL ONCE
Status: DISCONTINUED | OUTPATIENT
Start: 2018-11-04 | End: 2018-11-04

## 2018-11-04 RX ORDER — LORAZEPAM 2 MG/ML
1 INJECTION INTRAMUSCULAR PRN
Status: DISCONTINUED | OUTPATIENT
Start: 2018-11-04 | End: 2018-11-06 | Stop reason: HOSPADM

## 2018-11-04 RX ORDER — LORAZEPAM 2 MG/ML
1 INJECTION INTRAMUSCULAR ONCE
Status: COMPLETED | OUTPATIENT
Start: 2018-11-04 | End: 2018-11-04

## 2018-11-04 RX ORDER — SODIUM PHOSPHATE, DIBASIC AND SODIUM PHOSPHATE, MONOBASIC 7; 19 G/133ML; G/133ML
1 ENEMA RECTAL 2 TIMES DAILY
Status: DISPENSED | OUTPATIENT
Start: 2018-11-04 | End: 2018-11-06

## 2018-11-04 RX ADMIN — Medication 10 ML: at 07:58

## 2018-11-04 RX ADMIN — DEXTROSE MONOHYDRATE: 50 INJECTION, SOLUTION INTRAVENOUS at 18:19

## 2018-11-04 RX ADMIN — LEVETIRACETAM 1000 MG: 100 INJECTION, SOLUTION INTRAVENOUS at 18:08

## 2018-11-04 RX ADMIN — SODIUM PHOSPHATE 1 ENEMA: 7; 19 ENEMA RECTAL at 17:23

## 2018-11-04 RX ADMIN — MUPIROCIN: 20 OINTMENT TOPICAL at 12:48

## 2018-11-04 RX ADMIN — DEXTROSE MONOHYDRATE: 50 INJECTION, SOLUTION INTRAVENOUS at 12:49

## 2018-11-04 RX ADMIN — INSULIN LISPRO 2 UNITS: 100 INJECTION, SOLUTION INTRAVENOUS; SUBCUTANEOUS at 05:34

## 2018-11-04 RX ADMIN — MUPIROCIN: 20 OINTMENT TOPICAL at 21:02

## 2018-11-04 RX ADMIN — LORAZEPAM 1 MG: 2 INJECTION INTRAMUSCULAR; INTRAVENOUS at 10:45

## 2018-11-04 RX ADMIN — Medication 10 ML: at 21:04

## 2018-11-04 RX ADMIN — GLYCERIN 2 G: 2.1 SUPPOSITORY RECTAL at 17:23

## 2018-11-04 RX ADMIN — POTASSIUM CHLORIDE 10 MEQ: 7.46 INJECTION, SOLUTION INTRAVENOUS at 09:30

## 2018-11-04 RX ADMIN — DEXTROSE MONOHYDRATE: 50 INJECTION, SOLUTION INTRAVENOUS at 07:03

## 2018-11-04 RX ADMIN — POTASSIUM CHLORIDE 10 MEQ: 7.46 INJECTION, SOLUTION INTRAVENOUS at 11:21

## 2018-11-04 RX ADMIN — LEVETIRACETAM 1000 MG: 100 INJECTION, SOLUTION INTRAVENOUS at 07:02

## 2018-11-04 RX ADMIN — LEVOTHYROXINE SODIUM ANHYDROUS 37.5 MCG: 100 INJECTION, POWDER, LYOPHILIZED, FOR SOLUTION INTRAVENOUS at 05:40

## 2018-11-04 RX ADMIN — IOPAMIDOL 75 ML: 755 INJECTION, SOLUTION INTRAVENOUS at 07:57

## 2018-11-04 RX ADMIN — LORAZEPAM 1 MG: 2 INJECTION INTRAMUSCULAR; INTRAVENOUS at 16:46

## 2018-11-04 RX ADMIN — ENOXAPARIN SODIUM 40 MG: 40 INJECTION SUBCUTANEOUS at 07:57

## 2018-11-04 RX ADMIN — DEXTROSE MONOHYDRATE: 50 INJECTION, SOLUTION INTRAVENOUS at 01:51

## 2018-11-04 NOTE — PROGRESS NOTES
Nutrition Assessment    Type and Reason for Visit: Initial, Positive Nutrition Screen (+ screen for MST = 2 and altered po consistencies at group home (pureed and NTL))    Nutrition Recommendations:   1. Continue NPO status until patient is medically cleared for po diet order to start. 2. Monitor results of suppository and enema x 4 in next 48 hours. 3. Monitor plan of care r/t possible GI issue. 4. Monitor diet advancement and po intake (when diet is advanced). 5. Monitor for in-patient weight changes during remainder of admission. 6. Monitor nutrition-related labs and fluid/electrolyte management. Malnutrition Assessment:  · Malnutrition Status: At risk for malnutrition  · Context: Acute illness or injury  · Findings of the 6 clinical characteristics of malnutrition (Minimum of 2 out of 6 clinical characteristics is required to make the diagnosis of moderate or severe Protein Calorie Malnutrition based on AND/ASPEN Guidelines):  1. Energy Intake-Less than or equal to 75%, greater than or equal to 1 month    2. Weight Loss-Unable to assess, in 1 month  3. Fat Loss-Unable to assess,    4. Muscle Loss-Unable to assess,    5. Fluid Accumulation-No significant fluid accumulation,    6.   Strength-Not measured    Nutrition Diagnosis:   · Problem: Inadequate oral intake  · Etiology: related to Insufficient energy/nutrient consumption, Cognitive or neurological impairment, Alteration in GI function     Signs and symptoms:  as evidenced by NPO status due to medical condition, Patient report of, Diet history of poor intake, BMI, GI abnormality    Nutrition Assessment:  · Subjective Assessment: patient is nutritionally compromised as evidenced by poor po intake PTA, dehydration upon admission, and hx of dysphagia + aspiration AND he is at risk for further nutritional compromise d/t NPO status, 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      Electronically signed by Ivis Beverly RD, LD on 11/4/18 at 2:37 PM    Contact Number: [phone]

## 2018-11-04 NOTE — PROGRESS NOTES
Hospitalist Progress Note      PCP: Racquel Archer MD    Date of Admission: 11/3/2018    Chief Complaint: AMS, hypernatremia, poor PO      Subjective:     Remains drowsy, albeit grimacing today. Moves extremities spontaneously. No reports of seizures. Medications:  Reviewed    Infusion Medications    dextrose      dextrose       Scheduled Medications    insulin lispro  0-12 Units Subcutaneous Q4H    mupirocin   Nasal BID    aspirin  81 mg Oral Daily    carBAMazepine  400 mg Oral BID    vitamin D3  400 Units Oral Daily    docusate sodium  100 mg Oral BID    gabapentin  300 mg Oral 4x Daily    lamoTRIgine  400 mg Oral BID    lamoTRIgine  50 mg Oral Nightly    pantoprazole  40 mg Oral QAM AC    polyethylene glycol  17 g Oral Every Other Day    sertraline  50 mg Oral Nightly    zonisamide  400 mg Oral Daily    sodium chloride flush  10 mL Intravenous 2 times per day    enoxaparin  40 mg Subcutaneous Daily    levetiracetam  1,000 mg Intravenous Q12H    levothyroxine  37.5 mcg Intravenous Daily    influenza virus vaccine  0.5 mL Intramuscular Once     PRN Meds: LORazepam, acetaminophen, diazepam, simethicone, sodium chloride flush, magnesium hydroxide, ondansetron, glucose, dextrose, glucagon (rDNA), dextrose      Intake/Output Summary (Last 24 hours) at 11/04/18 1241  Last data filed at 11/04/18 1121   Gross per 24 hour   Intake          1659.85 ml   Output              810 ml   Net           849.85 ml       Physical Exam Performed:    /77   Pulse 80   Temp 96.9 °F (36.1 °C) (Axillary)   Resp 11   Wt 115 lb 1.6 oz (52.2 kg)   SpO2 100%   BMI 18.03 kg/m²     General appearance: drowsy, non verbal. Muscle wasting diffuse. HEENT Normal cephalic, atraumatic without obvious deformity. Pupils equal, round, and reactive to light. Extra ocular muscles intact. Conjunctivae/corneas clear. Neck: Supple, No jugular venous distention/bruits.   Trachea midline without thyromegaly or

## 2018-11-04 NOTE — PLAN OF CARE
Problem: Nutrition  Goal: Optimal nutrition therapy  Outcome: Met This Shift  Nutrition Problem: Inadequate oral intake  Intervention: Food and/or Nutrient Delivery: Continue NPO  Nutritional Goals: patient will adhere to NPO status until medically cleared for po diet order to start (general, dysphagia I, NTL); weight will remain stable during remainder of admission

## 2018-11-05 LAB
ALBUMIN SERPL-MCNC: 3.2 G/DL (ref 3.4–5)
ANION GAP SERPL CALCULATED.3IONS-SCNC: 9 MMOL/L (ref 3–16)
BASOPHILS ABSOLUTE: 0 K/UL (ref 0–0.2)
BASOPHILS RELATIVE PERCENT: 0.2 %
BUN BLDV-MCNC: 16 MG/DL (ref 7–20)
CALCIUM SERPL-MCNC: 8.4 MG/DL (ref 8.3–10.6)
CHLORIDE BLD-SCNC: 114 MMOL/L (ref 99–110)
CO2: 25 MMOL/L (ref 21–32)
CREAT SERPL-MCNC: 0.9 MG/DL (ref 0.9–1.3)
EOSINOPHILS ABSOLUTE: 0.1 K/UL (ref 0–0.6)
EOSINOPHILS RELATIVE PERCENT: 1 %
GFR AFRICAN AMERICAN: >60
GFR NON-AFRICAN AMERICAN: >60
GLUCOSE BLD-MCNC: 119 MG/DL (ref 70–99)
GLUCOSE BLD-MCNC: 125 MG/DL (ref 70–99)
GLUCOSE BLD-MCNC: 136 MG/DL (ref 70–99)
GLUCOSE BLD-MCNC: 141 MG/DL (ref 70–99)
GLUCOSE BLD-MCNC: 207 MG/DL (ref 70–99)
GLUCOSE BLD-MCNC: 291 MG/DL (ref 70–99)
HCT VFR BLD CALC: 50.2 % (ref 40.5–52.5)
HEMOGLOBIN: 16.1 G/DL (ref 13.5–17.5)
LYMPHOCYTES ABSOLUTE: 2.2 K/UL (ref 1–5.1)
LYMPHOCYTES RELATIVE PERCENT: 34 %
MAGNESIUM: 1.9 MG/DL (ref 1.8–2.4)
MCH RBC QN AUTO: 30.3 PG (ref 26–34)
MCHC RBC AUTO-ENTMCNC: 32.1 G/DL (ref 31–36)
MCV RBC AUTO: 94.3 FL (ref 80–100)
MONOCYTES ABSOLUTE: 0.4 K/UL (ref 0–1.3)
MONOCYTES RELATIVE PERCENT: 6.6 %
NEUTROPHILS ABSOLUTE: 3.8 K/UL (ref 1.7–7.7)
NEUTROPHILS RELATIVE PERCENT: 58.2 %
PDW BLD-RTO: 14.9 % (ref 12.4–15.4)
PERFORMED ON: ABNORMAL
PHOSPHORUS: 2.9 MG/DL (ref 2.5–4.9)
PLATELET # BLD: 48 K/UL (ref 135–450)
PLATELET SLIDE REVIEW: ABNORMAL
PMV BLD AUTO: 9.9 FL (ref 5–10.5)
POTASSIUM SERPL-SCNC: 3.4 MMOL/L (ref 3.5–5.1)
POTASSIUM SERPL-SCNC: 3.4 MMOL/L (ref 3.5–5.1)
RBC # BLD: 5.32 M/UL (ref 4.2–5.9)
SLIDE REVIEW: ABNORMAL
SODIUM BLD-SCNC: 142 MMOL/L (ref 136–145)
SODIUM BLD-SCNC: 147 MMOL/L (ref 136–145)
SODIUM BLD-SCNC: 148 MMOL/L (ref 136–145)
SODIUM BLD-SCNC: 151 MMOL/L (ref 136–145)
WBC # BLD: 6.5 K/UL (ref 4–11)

## 2018-11-05 PROCEDURE — 97530 THERAPEUTIC ACTIVITIES: CPT

## 2018-11-05 PROCEDURE — 6360000002 HC RX W HCPCS: Performed by: INTERNAL MEDICINE

## 2018-11-05 PROCEDURE — G8988 SELF CARE GOAL STATUS: HCPCS

## 2018-11-05 PROCEDURE — 36415 COLL VENOUS BLD VENIPUNCTURE: CPT

## 2018-11-05 PROCEDURE — 6370000000 HC RX 637 (ALT 250 FOR IP): Performed by: INTERNAL MEDICINE

## 2018-11-05 PROCEDURE — 2500000003 HC RX 250 WO HCPCS: Performed by: INTERNAL MEDICINE

## 2018-11-05 PROCEDURE — G8979 MOBILITY GOAL STATUS: HCPCS

## 2018-11-05 PROCEDURE — 92610 EVALUATE SWALLOWING FUNCTION: CPT

## 2018-11-05 PROCEDURE — G8996 SWALLOW CURRENT STATUS: HCPCS

## 2018-11-05 PROCEDURE — 83735 ASSAY OF MAGNESIUM: CPT

## 2018-11-05 PROCEDURE — 97162 PT EVAL MOD COMPLEX 30 MIN: CPT

## 2018-11-05 PROCEDURE — 99233 SBSQ HOSP IP/OBS HIGH 50: CPT | Performed by: INTERNAL MEDICINE

## 2018-11-05 PROCEDURE — 97167 OT EVAL HIGH COMPLEX 60 MIN: CPT

## 2018-11-05 PROCEDURE — 84295 ASSAY OF SERUM SODIUM: CPT

## 2018-11-05 PROCEDURE — 85025 COMPLETE CBC W/AUTO DIFF WBC: CPT

## 2018-11-05 PROCEDURE — 80069 RENAL FUNCTION PANEL: CPT

## 2018-11-05 PROCEDURE — G8978 MOBILITY CURRENT STATUS: HCPCS

## 2018-11-05 PROCEDURE — 2580000003 HC RX 258: Performed by: INTERNAL MEDICINE

## 2018-11-05 PROCEDURE — 1200000000 HC SEMI PRIVATE

## 2018-11-05 PROCEDURE — 99232 SBSQ HOSP IP/OBS MODERATE 35: CPT | Performed by: INTERNAL MEDICINE

## 2018-11-05 PROCEDURE — G8987 SELF CARE CURRENT STATUS: HCPCS

## 2018-11-05 PROCEDURE — G8997 SWALLOW GOAL STATUS: HCPCS

## 2018-11-05 PROCEDURE — 92526 ORAL FUNCTION THERAPY: CPT

## 2018-11-05 PROCEDURE — 84132 ASSAY OF SERUM POTASSIUM: CPT

## 2018-11-05 RX ORDER — DEXTROSE AND POTASSIUM CHLORIDE 5; .15 G/100ML; G/100ML
SOLUTION INTRAVENOUS CONTINUOUS
Status: DISCONTINUED | OUTPATIENT
Start: 2018-11-05 | End: 2018-11-06 | Stop reason: HOSPADM

## 2018-11-05 RX ORDER — FUROSEMIDE 10 MG/ML
20 INJECTION INTRAMUSCULAR; INTRAVENOUS ONCE
Status: COMPLETED | OUTPATIENT
Start: 2018-11-05 | End: 2018-11-05

## 2018-11-05 RX ORDER — SODIUM CHLORIDE 1000 MG
2 TABLET, SOLUBLE MISCELLANEOUS ONCE
Status: COMPLETED | OUTPATIENT
Start: 2018-11-05 | End: 2018-11-05

## 2018-11-05 RX ADMIN — LAMOTRIGINE 400 MG: 100 TABLET ORAL at 22:17

## 2018-11-05 RX ADMIN — SODIUM CHLORIDE TAB 1 GM 2 G: 1 TAB at 01:27

## 2018-11-05 RX ADMIN — GABAPENTIN 300 MG: 300 CAPSULE ORAL at 22:17

## 2018-11-05 RX ADMIN — MUPIROCIN: 20 OINTMENT TOPICAL at 22:24

## 2018-11-05 RX ADMIN — LAMOTRIGINE 50 MG: 100 TABLET ORAL at 22:19

## 2018-11-05 RX ADMIN — SERTRALINE HYDROCHLORIDE 50 MG: 50 TABLET ORAL at 22:18

## 2018-11-05 RX ADMIN — LEVOTHYROXINE SODIUM ANHYDROUS 37.5 MCG: 100 INJECTION, POWDER, LYOPHILIZED, FOR SOLUTION INTRAVENOUS at 05:38

## 2018-11-05 RX ADMIN — LEVETIRACETAM 1000 MG: 100 INJECTION, SOLUTION INTRAVENOUS at 17:52

## 2018-11-05 RX ADMIN — FUROSEMIDE 20 MG: 10 INJECTION, SOLUTION INTRAMUSCULAR; INTRAVENOUS at 01:27

## 2018-11-05 RX ADMIN — GLYCERIN 2 G: 2.1 SUPPOSITORY RECTAL at 09:06

## 2018-11-05 RX ADMIN — GABAPENTIN 300 MG: 300 CAPSULE ORAL at 16:35

## 2018-11-05 RX ADMIN — INSULIN LISPRO 4 UNITS: 100 INJECTION, SOLUTION INTRAVENOUS; SUBCUTANEOUS at 16:36

## 2018-11-05 RX ADMIN — MUPIROCIN: 20 OINTMENT TOPICAL at 09:07

## 2018-11-05 RX ADMIN — POTASSIUM CHLORIDE AND DEXTROSE MONOHYDRATE 40 ML/HR: 150; 5 INJECTION, SOLUTION INTRAVENOUS at 09:32

## 2018-11-05 RX ADMIN — LEVETIRACETAM 1000 MG: 100 INJECTION, SOLUTION INTRAVENOUS at 05:35

## 2018-11-05 RX ADMIN — Medication 10 ML: at 09:06

## 2018-11-05 RX ADMIN — CARBAMAZEPINE 400 MG: 200 TABLET, EXTENDED RELEASE ORAL at 22:24

## 2018-11-05 RX ADMIN — ENOXAPARIN SODIUM 40 MG: 40 INJECTION SUBCUTANEOUS at 09:08

## 2018-11-05 RX ADMIN — INSULIN LISPRO 6 UNITS: 100 INJECTION, SOLUTION INTRAVENOUS; SUBCUTANEOUS at 22:24

## 2018-11-05 ASSESSMENT — PAIN SCALES - WONG BAKER: WONGBAKER_NUMERICALRESPONSE: 0

## 2018-11-05 NOTE — PROGRESS NOTES
Shift assessment complete. Per night RN, patient is MR at baseline. Non verbal and is blind. However, it appears that he does respond to some voice for this RN. Na+ is now 148. Fluids off. Abdomen is somewhat firm. Bowels hypoactive x4. Large BM yesterday according to flowsheets. Lungs are clear throughout. NSR on monitor. No edema noted.      Electronically signed by Jackie Calle RN on 11/5/2018 at 9:17 AM

## 2018-11-05 NOTE — PLAN OF CARE
Problem: Falls - Risk of:  Goal: Absence of physical injury  Absence of physical injury   Outcome: Ongoing      Problem: Risk for Impaired Skin Integrity  Goal: Tissue integrity - skin and mucous membranes  Structural intactness and normal physiological function of skin and  mucous membranes.    Outcome: Ongoing      Problem: Restraint Use - Nonviolent/Non-Self-Destructive Behavior:  Goal: Absence of restraint-related injury  Absence of restraint-related injury   Outcome: Ongoing      Problem: Nutrition  Goal: Understanding of nutritional guidelines  Outcome: Ongoing

## 2018-11-05 NOTE — PROGRESS NOTES
Hospitalist Progress Note      PCP: Pérez Minaya MD    Date of Admission: 11/3/2018    Chief Complaint: AMS, hypernatremia, poor PO      Subjective:     Remains drowsy, albeit grimacing today. Moves extremities spontaneously. No reports of seizures. Medications:  Reviewed    Infusion Medications    dextrose 5 % with KCl 20 mEq 40 mL/hr (11/05/18 0932)    dextrose       Scheduled Medications    insulin lispro  0-12 Units Subcutaneous Q4H    mupirocin   Nasal BID    glycerin (ADULT)  1 suppository Rectal BID    fleet  1 enema Rectal BID    aspirin  81 mg Oral Daily    carBAMazepine  400 mg Oral BID    vitamin D3  400 Units Oral Daily    docusate sodium  100 mg Oral BID    gabapentin  300 mg Oral 4x Daily    lamoTRIgine  400 mg Oral BID    lamoTRIgine  50 mg Oral Nightly    pantoprazole  40 mg Oral QAM AC    polyethylene glycol  17 g Oral Every Other Day    sertraline  50 mg Oral Nightly    zonisamide  400 mg Oral Daily    sodium chloride flush  10 mL Intravenous 2 times per day    levetiracetam  1,000 mg Intravenous Q12H    levothyroxine  37.5 mcg Intravenous Daily    influenza virus vaccine  0.5 mL Intramuscular Once     PRN Meds: LORazepam, acetaminophen, diazepam, simethicone, sodium chloride flush, magnesium hydroxide, ondansetron, glucose, dextrose, glucagon (rDNA), dextrose      Intake/Output Summary (Last 24 hours) at 11/05/18 1117  Last data filed at 11/05/18 0919   Gross per 24 hour   Intake           982.09 ml   Output             2005 ml   Net         -1022.91 ml       Physical Exam Performed:    /74   Pulse 81   Temp 97 °F (36.1 °C) (Axillary)   Resp 25   Ht 5' 7\" (1.702 m) Comment: per past records in EMR  Wt 111 lb 12.8 oz (50.7 kg)   SpO2 100%   BMI 17.51 kg/m²     General appearance: drowsy, non verbal. Muscle wasting diffuse. HEENT Normal cephalic, atraumatic without obvious deformity. Pupils equal, round, and reactive to light.   Extra ocular

## 2018-11-05 NOTE — CARE COORDINATION
Case Management Assessment  Initial Evaluation    Date/Time of Evaluation: 11/5/2018 10:55 AM  Assessment Completed by: Noah Benjamin    Patient Name: Juan Carlos Barbour  YOB: 1975  Diagnosis: Hypernatremia [E87.0]  Date / Time: 11/3/2018  9:59 AM  Admission status/Date:inpatient 11/03/18  Chart Reviewed: Yes      Patient Interviewed: No   Family Interviewed:  Yes - pt's Mom and legal [address]      Hospitalization in the last 30 days:  No    Contacts  :     Relationship to Patient:   Phone Number:    Alternate Contact:   Claude Fast  Relationship to Patient:  manager at [employer] Number:  [phone]    Current PCP  Angelito Romo MD      Financial  Medicaid    ADLS  Support Systems: Family Members, Home Care Staff  Transportation: staff from [address]: [address] [place] [address] [employer]     Plans to Return to Present Housing: Yes  Other Identified Issues: no    DISCHARGE PLAN: Reviewed chart. Pt is in ICU. Pt non-verbal. CM spoke with pt's Mom,Andressa,Legal Guardian for initial interview. Pt from [facility] and plan return per pt's Mom. Writer spoke with Lexx Collier from [facility] at [phone] and she states pt is LTC and can return at d/c,+eCOC. Pt has 24/7 caregivers at [facility] and staff is able to transport pt at d/c per Lexx Collier. Following.

## 2018-11-05 NOTE — CONSULTS
less than 50    Historical Provider, MD   glucose (GLUTOSE) 40 % GEL Take 15 g by mouth See Admin Instructions if pt is symptomatic and BS less than 50    Historical Provider, MD   omeprazole (PRILOSEC) 20 MG capsule Take 20 mg by mouth daily. Historical Provider, MD   lamoTRIgine (LAMICTAL) 25 MG tablet Take 50 mg by mouth nightly     Historical Provider, MD   sertraline (ZOLOFT) 50 MG tablet Take 50 mg by mouth nightly. Historical Provider, MD   zonisamide (ZONEGRAN) 100 MG capsule Take 400 mg by mouth daily. Historical Provider, MD   simethicone (MYLICON) 80 MG chewable tablet Take 80 mg by mouth three times daily. Historical Provider, MD   Benzoyl Peroxide-Cleanser (LAVOCLEN-4 ACNE [address]) 4 % KIT Apply  topically daily. Use as directed every morning to acne. Historical Provider, MD   Sodium Phosphates (FLEET ENEMA) 7-19 GM/118ML ENEM Place 118 mLs rectally as needed. Insert 1 rectally as needed for constipation if no BM in 4 Days (x1)     Historical Provider, MD   aspirin 81 MG chewable tablet Take 81 mg by mouth daily. Historical Provider, MD   docusate sodium (COLACE) 100 MG capsule Take 100 mg by mouth 2 times daily. Historical Provider, MD   Melatonin 3 MG TABS Take 3 mg by mouth nightly. Historical Provider, MD   polyethylene glycol (GLYCOLAX) powder Take 17 g by mouth every other day. Historical Provider, MD   Acetaminophen (TYLENOL PO) Take  by mouth. 650 po/pr q4hr prn pain/fever     Historical Provider, MD   bisacodyl (DULCOLAX) 10 MG suppository Place 10 mg rectally daily as needed. Historical Provider, MD        Allergies:  Polymyxin b and Polytrim [polymyxin b-trimethoprim]    Social History:   TOBACCO:   reports that he has never smoked. He has never used smokeless tobacco.  ETOH:   reports that he does not drink alcohol. DRUGS:   reports that he does not use drugs. Family History:    History reviewed. No pertinent family history.     REVIEW OF Phos:     Recent Labs      11/04/18   0607   PHOS  2.9      INR: No results for input(s): INR in the last 72 hours. Radiology Review: Images personally reviewed by me. Initial non-con CT suggested appendicitis in addition to stercoral colitis  Subsequent contrast CT showed same structure, likely appendix, to be of normal caliber without mucosal enhancement. No surrounding inflammation noted either  IMPRESSION/RECOMMENDATIONS:    AMS and hypernatremia. Based on exam, contrast CT, normal WBC without left shift, I do not believe he has appendicitis.  Continue supportive medical care with hydration and correction of hypernatremia    Abdoulaye Quach

## 2018-11-05 NOTE — PLAN OF CARE
Problem: Falls - Risk of:  Goal: Will remain free from falls  Will remain free from falls   Outcome: Ongoing  Patient resting comfortably in bed with 4/4 padded side rails up and bed is in lowest and locked position. Call light and bedside table are within reach, however patient unable to utilize due to mental status. Hourly rounding in progress. Bed alarm present and operational.    Problem: Risk for Impaired Skin Integrity  Goal: Tissue integrity - skin and mucous membranes  Structural intactness and normal physiological function of skin and  mucous membranes. Outcome: Ongoing  Encouraging frequent repositioning in order to maintain/improve skin integrity. Problem: Restraint Use - Nonviolent/Non-Self-Destructive Behavior:  Goal: Absence of restraint indications  Absence of restraint indications   Outcome: Ongoing  Bilateral soft wrist restraints continued to ensure patient safety. Patient continues to pull at lines/tubes when restraints released to perform ROM exercises. Discontinuation criteria explained with no evidence of learning noted. Problem: Nutrition  Goal: Optimal nutrition therapy  Outcome: Ongoing  Patient currently NPO at this time.

## 2018-11-05 NOTE — PROGRESS NOTES
CTA B without w/r/r  Abdomen: +bs, soft, nt  Ext: no LE edema    Data/  Recent Labs      11/03/18   1145  11/04/18   0607  11/05/18   0859   WBC  10.0  5.7  6.5   HGB  14.7  13.8  16.1   HCT  46.0  42.8  50.2   MCV  94.7  94.5  94.3   PLT  130*  111*  48*     Recent Labs      11/03/18   1145   11/04/18   0607   11/04/18   1749  11/05/18   0002  11/05/18   0552   NA  166*   < >  156*  157*   < >  150*  142  148*   K  3.6   --   3.2*   --    --    --   3.4*   CL  132*   --   123*   --    --    --   114*   CO2  26   --   26   --    --    --   25   GLUCOSE  237*   --   217*   --    --    --   141*   PHOS   --    --   2.9   --    --    --   2.9   MG   --    --   2.00   --    --    --    --    BUN  28*   --   19   --    --    --   16   CREATININE  1.3   --   1.0   --    --    --   0.9   LABGLOM  >60   --   >60   --    --    --   >60   GFRAA  >60   --   >60   --    --    --   >60    < > = values in this interval not displayed. Assessment/     Hypernatremia suspect from decreased PO intake. - Free water deficit of 5.49L on admission.  - Goal correction of 8-10mmol per day.     AMS likely secondary to above. - Carbamazepine level within therapeutic range.     Suspected MALLY. - Serum creatinine not reflective of the patient's renal function given decreased muscle mass. Making decent amount of urine.     Hypokalemia.     Plan/   -resume d5w +20 meq kcl at 40 ml/h  -serial labs as ordered  -intake out put monitoring    José Miguel Buenrostro MD  [facility]  Office: [phone]  Fax:    [phone]

## 2018-11-05 NOTE — PROGRESS NOTES
Sodium level at midnight was 142. On call nephrologist notified, Dr. Lilly Calle. MD ordered 2grams salt tabs, one time, and 20mg lasix IV, one time.

## 2018-11-05 NOTE — PROGRESS NOTES
chair: Min A   4). Gait x 10' with min A of 2  5). Tolerate B LE exercises 10 reps    Rehabilitation Potential   Good for goals listed above. Strengths for achieving goals include: caregiver support  Barriers to achieving goals include: medical co-morbidities    Plan    To be seen 2-3x/week while in acute care setting for therapeutic exercises, bed mobility, transfers, progressive gait training, balance training, and family/patient education. Timed Code Treatment Minutes:  11 minutes  Total Treatment Time:   21 minutes    Candelaria Simmonds, PT, DPT #[id number]    If patient discharges from this facility prior to next visit, this note will serve as the Discharge Summary.

## 2018-11-05 NOTE — PROGRESS NOTES
Inpatient Occupational Therapy  Evaluation and Treatment    Unit:  [facility]   Date:  11/5/2018  Patient Name:    Mat Brush  Admitting diagnosis:  Hypernatremia [E87.0]  Admit Date:  11/3/2018  Precautions/Restrictions/WB Status/ Lines/ Wounds/ Oxygen:Legally blind (able to see shadows), nonverbal, candelario, IV, UE restraints   Treatment Time:  0934-6478  Treatment Number: 1    Patient Goals for Therapy:  \" not stated\"    Discharge Recommendations: return to group home   AM-PAC Score:  8    DME needs for discharge: not at this time      Preadmission Environment:     Information from caregiver at pt's group home  Pt. Lives at [facility] group home  Equipment owned: manual w/c     Preadmission Status   Pt. Not Able to drive   Pt. Is IND with feeding  Pt. Had assistance from caregivers for ADLs  Pt. Uses manual w/c at baseline, occasionally will propel with bilateral feet  Staff assist with ambulation, provided guided HHA in front of pt. Pain:  No grimacing         Cognition:    Opens eyes occasionally   Follows no commands     Upper Extremity ROM:    WFL- through observation   Upper Extremity Strength:     3/5     Upper Extremity Sensation:    No apparent deficits. Upper Extremity Proprioception:    No apparent deficits.     Coordination and Tone:      Balance:    Static Sitting:mod of one   Dynamic Sitting: NA   Static Standing:mod assist of two   Dynamic Standing:mod assist of two with third person to manage lines     Bed mobility:    Supine to sit:mod assist of two   Sit to supine:  Scooting to head of bed:  Scooting in sitting:  Rolling:  Bridging:    Transfers:    Sit to stand:mod assist of two-with third person to manage lines   Stand to sit:mod assist of two  Bed to Chair:NT- pt side stepped by the bed with -mod assist of two-with third person to manage lines   Bed to BSC:NT  Dressing:   UE:reportedly the pt can take shirt off when he wants to - not observed  LE:max assist

## 2018-11-05 NOTE — PROGRESS NOTES
Pulmonary Progress Note    CC: Hypernatremia; Acute encephalopathy    Subjective: patient is non-verbal at baseline. Intake/Output Summary (Last 24 hours) at 11/05/18 0940  Last data filed at 11/05/18 0919   Gross per 24 hour   Intake           982.09 ml   Output             2005 ml   Net         -1022.91 ml       Exam:   /73   Pulse 70   Temp 97.8 °F (36.6 °C) (Axillary)   Resp 11   Ht 5' 7\" (1.702 m) Comment: per past records in EMR  Wt 111 lb 12.8 oz (50.7 kg)   SpO2 100%   BMI 17.51 kg/m²  on RA  Gen: No distress. NCAT. Eyes: PERRL. No sclera icterus. No conjunctival injection. ENT: No discharge. Pharynx clear. Neck: Trachea midline. Normal thyroid. Resp: No accessory muscle use. No crackles. No wheezes. No rhonchi. No dullness on percussion. CV: Regular rate. Regular rhythm. No murmur or rub. No edema. GI: Non-tender. Non-distended. No masses. Skin: Warm and dry. No nodule on exposed extremities. No rash on exposed extremities. Lymph: No cervical LAD. No supraclavicular LAD. M/S: No cyanosis. No joint deformity. No clubbing. Neuro: somnolent but responds to touch. Moves all extremities. CN grossly intact.  Non verbal    Scheduled Meds:   insulin lispro  0-12 Units Subcutaneous Q4H    mupirocin   Nasal BID    glycerin (ADULT)  1 suppository Rectal BID    fleet  1 enema Rectal BID    aspirin  81 mg Oral Daily    carBAMazepine  400 mg Oral BID    vitamin D3  400 Units Oral Daily    docusate sodium  100 mg Oral BID    gabapentin  300 mg Oral 4x Daily    lamoTRIgine  400 mg Oral BID    lamoTRIgine  50 mg Oral Nightly    pantoprazole  40 mg Oral QAM AC    polyethylene glycol  17 g Oral Every Other Day    sertraline  50 mg Oral Nightly    zonisamide  400 mg Oral Daily    sodium chloride flush  10 mL Intravenous 2 times per day    enoxaparin  40 mg Subcutaneous Daily    levetiracetam  1,000 mg Intravenous Q12H    levothyroxine  37.5 mcg Intravenous Daily   

## 2018-11-06 VITALS
SYSTOLIC BLOOD PRESSURE: 96 MMHG | BODY MASS INDEX: 17.55 KG/M2 | HEIGHT: 67 IN | DIASTOLIC BLOOD PRESSURE: 58 MMHG | HEART RATE: 71 BPM | WEIGHT: 111.8 LBS | RESPIRATION RATE: 17 BRPM | OXYGEN SATURATION: 99 % | TEMPERATURE: 97.5 F

## 2018-11-06 LAB
ALBUMIN SERPL-MCNC: 2.8 G/DL (ref 3.4–5)
ANION GAP SERPL CALCULATED.3IONS-SCNC: 6 MMOL/L (ref 3–16)
ANION GAP SERPL CALCULATED.3IONS-SCNC: 8 MMOL/L (ref 3–16)
BUN BLDV-MCNC: 17 MG/DL (ref 7–20)
BUN BLDV-MCNC: 18 MG/DL (ref 7–20)
CALCIUM SERPL-MCNC: 7.9 MG/DL (ref 8.3–10.6)
CALCIUM SERPL-MCNC: 8 MG/DL (ref 8.3–10.6)
CHLORIDE BLD-SCNC: 110 MMOL/L (ref 99–110)
CHLORIDE BLD-SCNC: 115 MMOL/L (ref 99–110)
CO2: 26 MMOL/L (ref 21–32)
CO2: 27 MMOL/L (ref 21–32)
CREAT SERPL-MCNC: 0.8 MG/DL (ref 0.9–1.3)
CREAT SERPL-MCNC: 0.9 MG/DL (ref 0.9–1.3)
GFR AFRICAN AMERICAN: >60
GFR AFRICAN AMERICAN: >60
GFR NON-AFRICAN AMERICAN: >60
GFR NON-AFRICAN AMERICAN: >60
GLUCOSE BLD-MCNC: 140 MG/DL (ref 70–99)
GLUCOSE BLD-MCNC: 143 MG/DL (ref 70–99)
GLUCOSE BLD-MCNC: 220 MG/DL (ref 70–99)
GLUCOSE BLD-MCNC: 283 MG/DL (ref 70–99)
GLUCOSE BLD-MCNC: 349 MG/DL (ref 70–99)
GLUCOSE BLD-MCNC: 398 MG/DL (ref 70–99)
PERFORMED ON: ABNORMAL
PHOSPHORUS: 2.4 MG/DL (ref 2.5–4.9)
POTASSIUM SERPL-SCNC: 3.1 MMOL/L (ref 3.5–5.1)
POTASSIUM SERPL-SCNC: 3.9 MMOL/L (ref 3.5–5.1)
SODIUM BLD-SCNC: 143 MMOL/L (ref 136–145)
SODIUM BLD-SCNC: 145 MMOL/L (ref 136–145)
SODIUM BLD-SCNC: 149 MMOL/L (ref 136–145)

## 2018-11-06 PROCEDURE — 80069 RENAL FUNCTION PANEL: CPT

## 2018-11-06 PROCEDURE — 6360000002 HC RX W HCPCS: Performed by: INTERNAL MEDICINE

## 2018-11-06 PROCEDURE — 2580000003 HC RX 258: Performed by: INTERNAL MEDICINE

## 2018-11-06 PROCEDURE — 36415 COLL VENOUS BLD VENIPUNCTURE: CPT

## 2018-11-06 PROCEDURE — 6370000000 HC RX 637 (ALT 250 FOR IP): Performed by: INTERNAL MEDICINE

## 2018-11-06 PROCEDURE — 99239 HOSP IP/OBS DSCHRG MGMT >30: CPT | Performed by: INTERNAL MEDICINE

## 2018-11-06 PROCEDURE — G0008 ADMIN INFLUENZA VIRUS VAC: HCPCS | Performed by: INTERNAL MEDICINE

## 2018-11-06 PROCEDURE — 2500000003 HC RX 250 WO HCPCS: Performed by: INTERNAL MEDICINE

## 2018-11-06 PROCEDURE — 90686 IIV4 VACC NO PRSV 0.5 ML IM: CPT | Performed by: INTERNAL MEDICINE

## 2018-11-06 PROCEDURE — 84295 ASSAY OF SERUM SODIUM: CPT

## 2018-11-06 RX ORDER — POTASSIUM CHLORIDE 20MEQ/15ML
40 LIQUID (ML) ORAL ONCE
Status: COMPLETED | OUTPATIENT
Start: 2018-11-06 | End: 2018-11-06

## 2018-11-06 RX ORDER — INSULIN GLARGINE 100 [IU]/ML
12 INJECTION, SOLUTION SUBCUTANEOUS EVERY MORNING
Status: DISCONTINUED | OUTPATIENT
Start: 2018-11-06 | End: 2018-11-06 | Stop reason: HOSPADM

## 2018-11-06 RX ORDER — LEVETIRACETAM 500 MG/1
1000 TABLET ORAL 2 TIMES DAILY
Status: DISCONTINUED | OUTPATIENT
Start: 2018-11-06 | End: 2018-11-06 | Stop reason: HOSPADM

## 2018-11-06 RX ADMIN — INSULIN LISPRO 4 UNITS: 100 INJECTION, SOLUTION INTRAVENOUS; SUBCUTANEOUS at 04:16

## 2018-11-06 RX ADMIN — DOCUSATE SODIUM 100 MG: 100 CAPSULE, LIQUID FILLED ORAL at 08:59

## 2018-11-06 RX ADMIN — POTASSIUM CHLORIDE AND DEXTROSE MONOHYDRATE 60 ML/HR: 150; 5 INJECTION, SOLUTION INTRAVENOUS at 04:17

## 2018-11-06 RX ADMIN — ASPIRIN 81 MG 81 MG: 81 TABLET ORAL at 08:59

## 2018-11-06 RX ADMIN — LEVOTHYROXINE SODIUM ANHYDROUS 37.5 MCG: 100 INJECTION, POWDER, LYOPHILIZED, FOR SOLUTION INTRAVENOUS at 06:25

## 2018-11-06 RX ADMIN — CARBAMAZEPINE 400 MG: 200 TABLET, EXTENDED RELEASE ORAL at 09:00

## 2018-11-06 RX ADMIN — INSULIN LISPRO 8 UNITS: 100 INJECTION, SOLUTION INTRAVENOUS; SUBCUTANEOUS at 12:05

## 2018-11-06 RX ADMIN — INFLUENZA A VIRUS A/MICHIGAN/45/2015 X-275 (H1N1) ANTIGEN (FORMALDEHYDE INACTIVATED), INFLUENZA A VIRUS A/SINGAPORE/INFIMH-16-0019/2016 IVR-186 (H3N2) ANTIGEN (FORMALDEHYDE INACTIVATED), INFLUENZA B VIRUS B/PHUKET/3073/2013 ANTIGEN (FORMALDEHYDE INACTIVATED), AND INFLUENZA B VIRUS B/MARYLAND/15/2016 BX-69A ANTIGEN (FORMALDEHYDE INACTIVATED) 0.5 ML: 15; 15; 15; 15 INJECTION, SUSPENSION INTRAMUSCULAR at 15:27

## 2018-11-06 RX ADMIN — INSULIN LISPRO 2 UNITS: 100 INJECTION, SOLUTION INTRAVENOUS; SUBCUTANEOUS at 09:12

## 2018-11-06 RX ADMIN — LAMOTRIGINE 400 MG: 100 TABLET ORAL at 08:59

## 2018-11-06 RX ADMIN — INSULIN LISPRO 6 UNITS: 100 INJECTION, SOLUTION INTRAVENOUS; SUBCUTANEOUS at 00:30

## 2018-11-06 RX ADMIN — PANTOPRAZOLE SODIUM 40 MG: 40 TABLET, DELAYED RELEASE ORAL at 06:25

## 2018-11-06 RX ADMIN — POTASSIUM CHLORIDE 40 MEQ: 20 SOLUTION ORAL at 08:59

## 2018-11-06 RX ADMIN — GABAPENTIN 300 MG: 300 CAPSULE ORAL at 08:59

## 2018-11-06 RX ADMIN — GABAPENTIN 300 MG: 300 CAPSULE ORAL at 12:07

## 2018-11-06 RX ADMIN — INSULIN GLARGINE 12 UNITS: 100 INJECTION, SOLUTION SUBCUTANEOUS at 12:06

## 2018-11-06 RX ADMIN — LEVETIRACETAM 1000 MG: 100 INJECTION, SOLUTION INTRAVENOUS at 06:25

## 2018-11-06 RX ADMIN — CHOLECALCIFEROL TAB 10 MCG (400 UNIT) 400 UNITS: 10 TAB at 09:00

## 2018-11-06 RX ADMIN — ZONISAMIDE 400 MG: 100 CAPSULE ORAL at 08:59

## 2018-11-06 ASSESSMENT — PAIN SCALES - WONG BAKER: WONGBAKER_NUMERICALRESPONSE: 0

## 2018-11-06 NOTE — PROGRESS NOTES
Hospitalist Progress Note    This is my first encounter with the patient. Chart reviewed briefly. PCP: Pennie Deshpande MD    Date of Admission: 11/3/2018    Chief Complaint: AMS, hypernatremia, poor PO      Subjective:     Remains drowsy, albeit grimacing today. Moves extremities spontaneously. No reports of seizures. 11/6- awake and alert. Eating this am. Non verbal at baseline.       Medications:  Reviewed    Infusion Medications    dextrose 5 % with KCl 20 mEq 90 mL/hr (11/06/18 0900)    dextrose       Scheduled Medications    insulin lispro  0-12 Units Subcutaneous TID WC    insulin lispro  0-6 Units Subcutaneous Nightly    mupirocin   Nasal BID    glycerin (ADULT)  1 suppository Rectal BID    aspirin  81 mg Oral Daily    carBAMazepine  400 mg Oral BID    vitamin D3  400 Units Oral Daily    docusate sodium  100 mg Oral BID    gabapentin  300 mg Oral 4x Daily    lamoTRIgine  400 mg Oral BID    lamoTRIgine  50 mg Oral Nightly    pantoprazole  40 mg Oral QAM AC    polyethylene glycol  17 g Oral Every Other Day    sertraline  50 mg Oral Nightly    zonisamide  400 mg Oral Daily    sodium chloride flush  10 mL Intravenous 2 times per day    levetiracetam  1,000 mg Intravenous Q12H    levothyroxine  37.5 mcg Intravenous Daily    influenza virus vaccine  0.5 mL Intramuscular Once     PRN Meds: LORazepam, acetaminophen, diazepam, simethicone, sodium chloride flush, magnesium hydroxide, ondansetron, glucose, dextrose, glucagon (rDNA), dextrose      Intake/Output Summary (Last 24 hours) at 11/06/18 0935  Last data filed at 11/06/18 0853   Gross per 24 hour   Intake              440 ml   Output              650 ml   Net             -210 ml       Physical Exam Performed:    /63   Pulse 74   Temp 97.5 °F (36.4 °C) (Oral)   Resp 16   Ht 5' 7\" (1.702 m) Comment: per past records in EMR  Wt 111 lb 12.8 oz (50.7 kg)   SpO2 99%   BMI 17.51 kg/m²     General appearance: awake and

## 2018-11-08 LAB
BLOOD CULTURE, ROUTINE: NORMAL
CULTURE, BLOOD 2: NORMAL

## 2018-12-04 ENCOUNTER — APPOINTMENT (OUTPATIENT)
Dept: CT IMAGING | Age: 43
End: 2018-12-04
Payer: MEDICAID

## 2018-12-04 ENCOUNTER — HOSPITAL ENCOUNTER (EMERGENCY)
Age: 43
Discharge: HOME | End: 2018-12-04
Payer: MEDICAID

## 2018-12-04 VITALS
BODY MASS INDEX: 17.42 KG/M2 | WEIGHT: 111 LBS | RESPIRATION RATE: 20 BRPM | HEIGHT: 67 IN | OXYGEN SATURATION: 100 % | HEART RATE: 60 BPM | SYSTOLIC BLOOD PRESSURE: 140 MMHG | TEMPERATURE: 97.3 F | DIASTOLIC BLOOD PRESSURE: 88 MMHG

## 2018-12-04 DIAGNOSIS — S09.90XA CLOSED HEAD INJURY, INITIAL ENCOUNTER: Primary | ICD-10-CM

## 2018-12-04 DIAGNOSIS — S00.83XA FACIAL CONTUSION, INITIAL ENCOUNTER: ICD-10-CM

## 2018-12-04 PROCEDURE — 72125 CT NECK SPINE W/O DYE: CPT

## 2018-12-04 PROCEDURE — 70450 CT HEAD/BRAIN W/O DYE: CPT

## 2018-12-04 PROCEDURE — 99284 EMERGENCY DEPT VISIT MOD MDM: CPT

## 2018-12-04 ASSESSMENT — ENCOUNTER SYMPTOMS: VOMITING: 0

## 2018-12-04 NOTE — ED PROVIDER NOTES
tablet Take 50 mg by mouth nightly     Historical Provider, MD   sertraline (ZOLOFT) 50 MG tablet Take 50 mg by mouth nightly. Historical Provider, MD   zonisamide (ZONEGRAN) 100 MG capsule Take 400 mg by mouth daily. Historical Provider, MD   simethicone (MYLICON) 80 MG chewable tablet Take 80 mg by mouth three times daily. Historical Provider, MD   Benzoyl Peroxide-Cleanser (LAVOCLEN-4 ACNE [address]) 4 % KIT Apply  topically daily. Use as directed every morning to acne. Historical Provider, MD   Sodium Phosphates (FLEET ENEMA) 7-19 GM/118ML ENEM Place 118 mLs rectally as needed. Insert 1 rectally as needed for constipation if no BM in 4 Days (x1)     Historical Provider, MD   aspirin 81 MG chewable tablet Take 81 mg by mouth daily. Historical Provider, MD   docusate sodium (COLACE) 100 MG capsule Take 100 mg by mouth 2 times daily. Historical Provider, MD   Melatonin 3 MG TABS Take 3 mg by mouth nightly. Historical Provider, MD   polyethylene glycol (GLYCOLAX) powder Take 17 g by mouth every other day. Historical Provider, MD   Acetaminophen (TYLENOL PO) Take  by mouth. 650 po/pr q4hr prn pain/fever     Historical Provider, MD   bisacodyl (DULCOLAX) 10 MG suppository Place 10 mg rectally daily as needed. Historical Provider, MD        ALLERGIES  is allergic to polymyxin b and polytrim [polymyxin b-trimethoprim]. BP (!) 140/88   Pulse 60   Temp 97.3 °F (36.3 °C) (Oral)   Resp 20   Ht 5' 7\" (1.702 m)   Wt 111 lb (50.3 kg)   SpO2 100%   BMI 17.39 kg/m²     Physical Exam   Constitutional: He appears well-developed and well-nourished. Non-toxic appearance. He does not have a sickly appearance. He does not appear ill. HENT:   Head: Normocephalic and atraumatic. Head is without raccoon's eyes and without Barahona's sign. Right Ear: No hemotympanum. Left Ear: No hemotympanum. Nose: No sinus tenderness or nasal septal hematoma. No epistaxis.    Left forehead contusion,

## 2018-12-10 ENCOUNTER — HOSPITAL ENCOUNTER (INPATIENT)
Age: 43
LOS: 3 days | Discharge: HOME | DRG: 053 | End: 2018-12-13
Attending: INTERNAL MEDICINE | Admitting: INTERNAL MEDICINE
Payer: MEDICAID

## 2018-12-10 ENCOUNTER — APPOINTMENT (OUTPATIENT)
Dept: CT IMAGING | Age: 43
End: 2018-12-10
Payer: MEDICAID

## 2018-12-10 ENCOUNTER — HOSPITAL ENCOUNTER (EMERGENCY)
Age: 43
Discharge: OTHER INSTITUTION | End: 2018-12-10
Attending: EMERGENCY MEDICINE
Payer: MEDICAID

## 2018-12-10 ENCOUNTER — APPOINTMENT (OUTPATIENT)
Dept: GENERAL RADIOLOGY | Age: 43
End: 2018-12-10
Payer: MEDICAID

## 2018-12-10 VITALS
HEART RATE: 72 BPM | WEIGHT: 110.89 LBS | SYSTOLIC BLOOD PRESSURE: 124 MMHG | TEMPERATURE: 97.5 F | BODY MASS INDEX: 17.37 KG/M2 | DIASTOLIC BLOOD PRESSURE: 72 MMHG | OXYGEN SATURATION: 95 % | RESPIRATION RATE: 12 BRPM

## 2018-12-10 DIAGNOSIS — G40.901 STATUS EPILEPTICUS, GENERALIZED CONVULSIVE (HCC): Primary | ICD-10-CM

## 2018-12-10 LAB
A/G RATIO: 1.2 (ref 1.1–2.2)
ALBUMIN SERPL-MCNC: 4.2 G/DL (ref 3.4–5)
ALP BLD-CCNC: 209 U/L (ref 40–129)
ALT SERPL-CCNC: 18 U/L (ref 10–40)
AMORPHOUS: ABNORMAL /HPF
ANION GAP SERPL CALCULATED.3IONS-SCNC: 17 MMOL/L (ref 3–16)
AST SERPL-CCNC: 19 U/L (ref 15–37)
BACTERIA: ABNORMAL /HPF
BASOPHILS ABSOLUTE: 0 K/UL (ref 0–0.2)
BASOPHILS RELATIVE PERCENT: 0.6 %
BILIRUB SERPL-MCNC: <0.2 MG/DL (ref 0–1)
BILIRUBIN URINE: NEGATIVE
BLOOD, URINE: NEGATIVE
BUN BLDV-MCNC: 17 MG/DL (ref 7–20)
CALCIUM SERPL-MCNC: 9.4 MG/DL (ref 8.3–10.6)
CARBAMAZEPINE DOSE: NORMAL
CARBAMAZEPINE LEVEL: 7.1 UG/ML (ref 4–12)
CHLORIDE BLD-SCNC: 104 MMOL/L (ref 99–110)
CLARITY: ABNORMAL
CO2: 19 MMOL/L (ref 21–32)
COLOR: YELLOW
CREAT SERPL-MCNC: 1.2 MG/DL (ref 0.9–1.3)
CRYSTALS, UA: ABNORMAL /HPF
EOSINOPHILS ABSOLUTE: 0 K/UL (ref 0–0.6)
EOSINOPHILS RELATIVE PERCENT: 0.5 %
EPITHELIAL CELLS, UA: ABNORMAL /HPF
GFR AFRICAN AMERICAN: >60
GFR NON-AFRICAN AMERICAN: >60
GLOBULIN: 3.6 G/DL
GLUCOSE BLD-MCNC: 86 MG/DL (ref 70–99)
GLUCOSE URINE: NEGATIVE MG/DL
HCT VFR BLD CALC: 45.9 % (ref 40.5–52.5)
HEMOGLOBIN: 14.9 G/DL (ref 13.5–17.5)
KETONES, URINE: ABNORMAL MG/DL
LACTIC ACID, SEPSIS: 2.1 MMOL/L (ref 0.4–1.9)
LACTIC ACID, SEPSIS: 9.7 MMOL/L (ref 0.4–1.9)
LEUKOCYTE ESTERASE, URINE: NEGATIVE
LYMPHOCYTES ABSOLUTE: 3 K/UL (ref 1–5.1)
LYMPHOCYTES RELATIVE PERCENT: 46.9 %
MCH RBC QN AUTO: 30.8 PG (ref 26–34)
MCHC RBC AUTO-ENTMCNC: 32.5 G/DL (ref 31–36)
MCV RBC AUTO: 94.6 FL (ref 80–100)
MICROSCOPIC EXAMINATION: YES
MONOCYTES ABSOLUTE: 0.3 K/UL (ref 0–1.3)
MONOCYTES RELATIVE PERCENT: 4.7 %
MUCUS: ABNORMAL /LPF
NEUTROPHILS ABSOLUTE: 3.1 K/UL (ref 1.7–7.7)
NEUTROPHILS RELATIVE PERCENT: 47.3 %
NITRITE, URINE: NEGATIVE
PDW BLD-RTO: 15.5 % (ref 12.4–15.4)
PH UA: 7.5
PLATELET # BLD: 167 K/UL (ref 135–450)
PMV BLD AUTO: 7.9 FL (ref 5–10.5)
POTASSIUM REFLEX MAGNESIUM: 3.8 MMOL/L (ref 3.5–5.1)
PROCALCITONIN: 0.04 NG/ML (ref 0–0.15)
PROTEIN UA: 100 MG/DL
RBC # BLD: 4.85 M/UL (ref 4.2–5.9)
RBC UA: ABNORMAL /HPF (ref 0–2)
SODIUM BLD-SCNC: 140 MMOL/L (ref 136–145)
SPECIFIC GRAVITY UA: 1.02
TOTAL PROTEIN: 7.8 G/DL (ref 6.4–8.2)
TROPONIN: <0.01 NG/ML
URINE TYPE: ABNORMAL
UROBILINOGEN, URINE: 1 E.U./DL
WBC # BLD: 6.5 K/UL (ref 4–11)
WBC UA: ABNORMAL /HPF (ref 0–5)

## 2018-12-10 PROCEDURE — 81001 URINALYSIS AUTO W/SCOPE: CPT

## 2018-12-10 PROCEDURE — 96361 HYDRATE IV INFUSION ADD-ON: CPT

## 2018-12-10 PROCEDURE — 71045 X-RAY EXAM CHEST 1 VIEW: CPT

## 2018-12-10 PROCEDURE — 84145 PROCALCITONIN (PCT): CPT

## 2018-12-10 PROCEDURE — 70450 CT HEAD/BRAIN W/O DYE: CPT

## 2018-12-10 PROCEDURE — 85025 COMPLETE CBC W/AUTO DIFF WBC: CPT

## 2018-12-10 PROCEDURE — 96360 HYDRATION IV INFUSION INIT: CPT

## 2018-12-10 PROCEDURE — 84484 ASSAY OF TROPONIN QUANT: CPT

## 2018-12-10 PROCEDURE — 93005 ELECTROCARDIOGRAM TRACING: CPT | Performed by: EMERGENCY MEDICINE

## 2018-12-10 PROCEDURE — 80156 ASSAY CARBAMAZEPINE TOTAL: CPT

## 2018-12-10 PROCEDURE — 87086 URINE CULTURE/COLONY COUNT: CPT

## 2018-12-10 PROCEDURE — 80053 COMPREHEN METABOLIC PANEL: CPT

## 2018-12-10 PROCEDURE — 83605 ASSAY OF LACTIC ACID: CPT

## 2018-12-10 PROCEDURE — 87040 BLOOD CULTURE FOR BACTERIA: CPT

## 2018-12-10 PROCEDURE — 2580000003 HC RX 258: Performed by: EMERGENCY MEDICINE

## 2018-12-10 PROCEDURE — 99291 CRITICAL CARE FIRST HOUR: CPT

## 2018-12-10 PROCEDURE — 2000000000 HC ICU R&B

## 2018-12-10 RX ORDER — ZONISAMIDE 100 MG/1
400 CAPSULE ORAL DAILY
Status: DISCONTINUED | OUTPATIENT
Start: 2018-12-11 | End: 2018-12-13 | Stop reason: HOSPADM

## 2018-12-10 RX ORDER — SODIUM CHLORIDE 0.9 % (FLUSH) 0.9 %
10 SYRINGE (ML) INJECTION EVERY 12 HOURS SCHEDULED
Status: DISCONTINUED | OUTPATIENT
Start: 2018-12-11 | End: 2018-12-13 | Stop reason: HOSPADM

## 2018-12-10 RX ORDER — SODIUM CHLORIDE 0.9 % (FLUSH) 0.9 %
10 SYRINGE (ML) INJECTION PRN
Status: DISCONTINUED | OUTPATIENT
Start: 2018-12-10 | End: 2018-12-13 | Stop reason: HOSPADM

## 2018-12-10 RX ORDER — DEXTROSE MONOHYDRATE 50 MG/ML
100 INJECTION, SOLUTION INTRAVENOUS PRN
Status: DISCONTINUED | OUTPATIENT
Start: 2018-12-10 | End: 2018-12-13 | Stop reason: HOSPADM

## 2018-12-10 RX ORDER — 0.9 % SODIUM CHLORIDE 0.9 %
1000 INTRAVENOUS SOLUTION INTRAVENOUS ONCE
Status: DISCONTINUED | OUTPATIENT
Start: 2018-12-10 | End: 2018-12-10

## 2018-12-10 RX ORDER — ONDANSETRON 2 MG/ML
4 INJECTION INTRAMUSCULAR; INTRAVENOUS EVERY 6 HOURS PRN
Status: DISCONTINUED | OUTPATIENT
Start: 2018-12-10 | End: 2018-12-13 | Stop reason: HOSPADM

## 2018-12-10 RX ORDER — LEVOTHYROXINE SODIUM 0.07 MG/1
75 TABLET ORAL DAILY
Status: DISCONTINUED | OUTPATIENT
Start: 2018-12-11 | End: 2018-12-13 | Stop reason: HOSPADM

## 2018-12-10 RX ORDER — LORAZEPAM 2 MG/ML
2 INJECTION INTRAMUSCULAR
Status: DISCONTINUED | OUTPATIENT
Start: 2018-12-10 | End: 2018-12-10 | Stop reason: HOSPADM

## 2018-12-10 RX ORDER — LAMOTRIGINE 100 MG/1
50 TABLET ORAL NIGHTLY
Status: DISCONTINUED | OUTPATIENT
Start: 2018-12-11 | End: 2018-12-11

## 2018-12-10 RX ORDER — GABAPENTIN 300 MG/1
300 CAPSULE ORAL 4 TIMES DAILY
Status: DISCONTINUED | OUTPATIENT
Start: 2018-12-11 | End: 2018-12-13 | Stop reason: HOSPADM

## 2018-12-10 RX ORDER — SODIUM CHLORIDE 0.9 % (FLUSH) 0.9 %
10 SYRINGE (ML) INJECTION PRN
Status: DISCONTINUED | OUTPATIENT
Start: 2018-12-10 | End: 2018-12-10 | Stop reason: HOSPADM

## 2018-12-10 RX ORDER — 0.9 % SODIUM CHLORIDE 0.9 %
30 INTRAVENOUS SOLUTION INTRAVENOUS ONCE
Status: COMPLETED | OUTPATIENT
Start: 2018-12-10 | End: 2018-12-10

## 2018-12-10 RX ORDER — ASPIRIN 81 MG/1
81 TABLET, CHEWABLE ORAL DAILY
Status: DISCONTINUED | OUTPATIENT
Start: 2018-12-11 | End: 2018-12-13 | Stop reason: HOSPADM

## 2018-12-10 RX ORDER — SODIUM CHLORIDE 0.9 % (FLUSH) 0.9 %
10 SYRINGE (ML) INJECTION EVERY 12 HOURS SCHEDULED
Status: DISCONTINUED | OUTPATIENT
Start: 2018-12-10 | End: 2018-12-10 | Stop reason: HOSPADM

## 2018-12-10 RX ORDER — NICOTINE POLACRILEX 4 MG
15 LOZENGE BUCCAL PRN
Status: DISCONTINUED | OUTPATIENT
Start: 2018-12-10 | End: 2018-12-13 | Stop reason: HOSPADM

## 2018-12-10 RX ORDER — CARBAMAZEPINE 200 MG/1
400 CAPSULE, EXTENDED RELEASE ORAL EVERY 12 HOURS
Status: DISCONTINUED | OUTPATIENT
Start: 2018-12-11 | End: 2018-12-13 | Stop reason: HOSPADM

## 2018-12-10 RX ORDER — DEXTROSE MONOHYDRATE 25 G/50ML
12.5 INJECTION, SOLUTION INTRAVENOUS PRN
Status: DISCONTINUED | OUTPATIENT
Start: 2018-12-10 | End: 2018-12-13 | Stop reason: HOSPADM

## 2018-12-10 RX ADMIN — SODIUM CHLORIDE 1509 ML: 9 INJECTION, SOLUTION INTRAVENOUS at 18:05

## 2018-12-10 ASSESSMENT — PAIN SCALES - WONG BAKER
WONGBAKER_NUMERICALRESPONSE: 0
WONGBAKER_NUMERICALRESPONSE: 0

## 2018-12-11 LAB
A/G RATIO: 1.2 (ref 1.1–2.2)
ALBUMIN SERPL-MCNC: 3.8 G/DL (ref 3.4–5)
ALP BLD-CCNC: 180 U/L (ref 40–129)
ALT SERPL-CCNC: 14 U/L (ref 10–40)
ANION GAP SERPL CALCULATED.3IONS-SCNC: 12 MMOL/L (ref 3–16)
AST SERPL-CCNC: 17 U/L (ref 15–37)
BASOPHILS ABSOLUTE: 0 K/UL (ref 0–0.2)
BASOPHILS RELATIVE PERCENT: 0.1 %
BILIRUB SERPL-MCNC: <0.2 MG/DL (ref 0–1)
BUN BLDV-MCNC: 14 MG/DL (ref 7–20)
CALCIUM SERPL-MCNC: 9.1 MG/DL (ref 8.3–10.6)
CHLORIDE BLD-SCNC: 109 MMOL/L (ref 99–110)
CO2: 22 MMOL/L (ref 21–32)
CREAT SERPL-MCNC: 0.9 MG/DL (ref 0.9–1.3)
EKG ATRIAL RATE: 80 BPM
EKG DIAGNOSIS: NORMAL
EKG P AXIS: 71 DEGREES
EKG P-R INTERVAL: 146 MS
EKG Q-T INTERVAL: 384 MS
EKG QRS DURATION: 74 MS
EKG QTC CALCULATION (BAZETT): 442 MS
EKG R AXIS: 88 DEGREES
EKG T AXIS: 81 DEGREES
EKG VENTRICULAR RATE: 80 BPM
EOSINOPHILS ABSOLUTE: 0 K/UL (ref 0–0.6)
EOSINOPHILS RELATIVE PERCENT: 0.2 %
GFR AFRICAN AMERICAN: >60
GFR NON-AFRICAN AMERICAN: >60
GLOBULIN: 3.2 G/DL
GLUCOSE BLD-MCNC: 130 MG/DL (ref 70–99)
GLUCOSE BLD-MCNC: 144 MG/DL (ref 70–99)
GLUCOSE BLD-MCNC: 174 MG/DL (ref 70–99)
GLUCOSE BLD-MCNC: 199 MG/DL (ref 70–99)
GLUCOSE BLD-MCNC: 323 MG/DL (ref 70–99)
GLUCOSE BLD-MCNC: 34 MG/DL (ref 70–99)
GLUCOSE BLD-MCNC: 53 MG/DL (ref 70–99)
GLUCOSE BLD-MCNC: 63 MG/DL (ref 70–99)
GLUCOSE BLD-MCNC: 68 MG/DL (ref 70–99)
GLUCOSE BLD-MCNC: 81 MG/DL (ref 70–99)
GLUCOSE BLD-MCNC: 91 MG/DL (ref 70–99)
HCT VFR BLD CALC: 45 % (ref 40.5–52.5)
HEMOGLOBIN: 14.4 G/DL (ref 13.5–17.5)
LYMPHOCYTES ABSOLUTE: 2 K/UL (ref 1–5.1)
LYMPHOCYTES RELATIVE PERCENT: 20.1 %
MCH RBC QN AUTO: 30.3 PG (ref 26–34)
MCHC RBC AUTO-ENTMCNC: 31.9 G/DL (ref 31–36)
MCV RBC AUTO: 95 FL (ref 80–100)
MONOCYTES ABSOLUTE: 0.6 K/UL (ref 0–1.3)
MONOCYTES RELATIVE PERCENT: 6 %
NEUTROPHILS ABSOLUTE: 7.4 K/UL (ref 1.7–7.7)
NEUTROPHILS RELATIVE PERCENT: 73.6 %
PDW BLD-RTO: 15.4 % (ref 12.4–15.4)
PERFORMED ON: ABNORMAL
PERFORMED ON: NORMAL
PLATELET # BLD: 155 K/UL (ref 135–450)
PMV BLD AUTO: 7.1 FL (ref 5–10.5)
POTASSIUM REFLEX MAGNESIUM: 4.2 MMOL/L (ref 3.5–5.1)
RBC # BLD: 4.74 M/UL (ref 4.2–5.9)
SODIUM BLD-SCNC: 143 MMOL/L (ref 136–145)
TOTAL PROTEIN: 7 G/DL (ref 6.4–8.2)
WBC # BLD: 10 K/UL (ref 4–11)

## 2018-12-11 PROCEDURE — 80053 COMPREHEN METABOLIC PANEL: CPT

## 2018-12-11 PROCEDURE — 93010 ELECTROCARDIOGRAM REPORT: CPT | Performed by: INTERNAL MEDICINE

## 2018-12-11 PROCEDURE — 2500000003 HC RX 250 WO HCPCS: Performed by: STUDENT IN AN ORGANIZED HEALTH CARE EDUCATION/TRAINING PROGRAM

## 2018-12-11 PROCEDURE — 6360000002 HC RX W HCPCS: Performed by: NURSE PRACTITIONER

## 2018-12-11 PROCEDURE — 2580000003 HC RX 258: Performed by: STUDENT IN AN ORGANIZED HEALTH CARE EDUCATION/TRAINING PROGRAM

## 2018-12-11 PROCEDURE — 2000000000 HC ICU R&B

## 2018-12-11 PROCEDURE — 4A10X4Z MONITORING OF CENTRAL NERVOUS ELECTRICAL ACTIVITY, EXTERNAL APPROACH: ICD-10-PCS | Performed by: PSYCHIATRY & NEUROLOGY

## 2018-12-11 PROCEDURE — 2580000003 HC RX 258: Performed by: NURSE PRACTITIONER

## 2018-12-11 PROCEDURE — 99291 CRITICAL CARE FIRST HOUR: CPT | Performed by: INTERNAL MEDICINE

## 2018-12-11 PROCEDURE — 6370000000 HC RX 637 (ALT 250 FOR IP): Performed by: STUDENT IN AN ORGANIZED HEALTH CARE EDUCATION/TRAINING PROGRAM

## 2018-12-11 PROCEDURE — 95951 HC EEG MONITOR/VIDEO LESS THAN 24: CPT

## 2018-12-11 PROCEDURE — 99221 1ST HOSP IP/OBS SF/LOW 40: CPT | Performed by: NURSE PRACTITIONER

## 2018-12-11 PROCEDURE — C9254 INJECTION, LACOSAMIDE: HCPCS | Performed by: NURSE PRACTITIONER

## 2018-12-11 PROCEDURE — 85025 COMPLETE CBC W/AUTO DIFF WBC: CPT

## 2018-12-11 PROCEDURE — 36415 COLL VENOUS BLD VENIPUNCTURE: CPT

## 2018-12-11 RX ORDER — LAMOTRIGINE 150 MG/1
450 TABLET ORAL NIGHTLY
Status: DISCONTINUED | OUTPATIENT
Start: 2018-12-11 | End: 2018-12-13 | Stop reason: HOSPADM

## 2018-12-11 RX ORDER — SODIUM CHLORIDE 9 MG/ML
INJECTION, SOLUTION INTRAVENOUS CONTINUOUS
Status: DISCONTINUED | OUTPATIENT
Start: 2018-12-11 | End: 2018-12-11

## 2018-12-11 RX ORDER — LACOSAMIDE 10 MG/ML
100 INJECTION, SOLUTION INTRAVENOUS 2 TIMES DAILY
Status: DISCONTINUED | OUTPATIENT
Start: 2018-12-11 | End: 2018-12-11 | Stop reason: CLARIF

## 2018-12-11 RX ORDER — SODIUM CHLORIDE 9 MG/ML
INJECTION, SOLUTION INTRAVENOUS
Status: DISPENSED
Start: 2018-12-11 | End: 2018-12-11

## 2018-12-11 RX ORDER — LORAZEPAM 2 MG/ML
4 INJECTION INTRAMUSCULAR EVERY 4 HOURS PRN
Status: DISCONTINUED | OUTPATIENT
Start: 2018-12-11 | End: 2018-12-13 | Stop reason: HOSPADM

## 2018-12-11 RX ORDER — DEXTROSE MONOHYDRATE 50 MG/ML
INJECTION, SOLUTION INTRAVENOUS CONTINUOUS
Status: DISCONTINUED | OUTPATIENT
Start: 2018-12-11 | End: 2018-12-11

## 2018-12-11 RX ORDER — LACOSAMIDE 10 MG/ML
150 INJECTION, SOLUTION INTRAVENOUS ONCE
Status: DISCONTINUED | OUTPATIENT
Start: 2018-12-11 | End: 2018-12-11

## 2018-12-11 RX ORDER — DEXTROSE, SODIUM CHLORIDE, AND POTASSIUM CHLORIDE 5; .45; .15 G/100ML; G/100ML; G/100ML
INJECTION INTRAVENOUS CONTINUOUS
Status: DISCONTINUED | OUTPATIENT
Start: 2018-12-11 | End: 2018-12-12

## 2018-12-11 RX ADMIN — DEXTROSE MONOHYDRATE 12.5 G: 25 INJECTION, SOLUTION INTRAVENOUS at 13:39

## 2018-12-11 RX ADMIN — DEXTROSE MONOHYDRATE 100 MG: 50 INJECTION, SOLUTION INTRAVENOUS at 23:22

## 2018-12-11 RX ADMIN — Medication 10 ML: at 21:26

## 2018-12-11 RX ADMIN — CARBAMAZEPINE 400 MG: 200 CAPSULE, EXTENDED RELEASE ORAL at 23:28

## 2018-12-11 RX ADMIN — SODIUM CHLORIDE: 0.9 INJECTION, SOLUTION INTRAVENOUS at 11:06

## 2018-12-11 RX ADMIN — Medication 10 ML: at 11:09

## 2018-12-11 RX ADMIN — SERTRALINE HYDROCHLORIDE 50 MG: 50 TABLET ORAL at 21:25

## 2018-12-11 RX ADMIN — DEXTROSE MONOHYDRATE, SODIUM CHLORIDE, AND POTASSIUM CHLORIDE: 50; 4.5; 1.49 INJECTION, SOLUTION INTRAVENOUS at 12:35

## 2018-12-11 RX ADMIN — DEXTROSE MONOHYDRATE 12.5 G: 25 INJECTION, SOLUTION INTRAVENOUS at 09:49

## 2018-12-11 RX ADMIN — GABAPENTIN 300 MG: 300 CAPSULE ORAL at 21:25

## 2018-12-11 RX ADMIN — INSULIN GLARGINE 12 UNITS: 100 INJECTION, SOLUTION SUBCUTANEOUS at 21:03

## 2018-12-11 RX ADMIN — DEXTROSE MONOHYDRATE 12.5 G: 25 INJECTION, SOLUTION INTRAVENOUS at 00:40

## 2018-12-11 RX ADMIN — INSULIN LISPRO 2 UNITS: 100 INJECTION, SOLUTION INTRAVENOUS; SUBCUTANEOUS at 21:03

## 2018-12-11 RX ADMIN — LAMOTRIGINE 450 MG: 150 TABLET ORAL at 21:25

## 2018-12-11 RX ADMIN — DEXTROSE MONOHYDRATE 150 MG: 50 INJECTION, SOLUTION INTRAVENOUS at 10:00

## 2018-12-11 ASSESSMENT — PAIN SCALES - WONG BAKER

## 2018-12-11 NOTE — ED PROVIDER NOTES
Non- >60 >60    GFR African American >60 >60    Calcium 9.4 8.3 - 10.6 mg/dL    Total Protein 7.8 6.4 - 8.2 g/dL    Alb 4.2 3.4 - 5.0 g/dL    Albumin/Globulin Ratio 1.2 1.1 - 2.2    Total Bilirubin <0.2 0.0 - 1.0 mg/dL    Alkaline Phosphatase 209 (H) 40 - 129 U/L    ALT 18 10 - 40 U/L    AST 19 15 - 37 U/L    Globulin 3.6 g/dL   Urinalysis, reflex to microscopic   Result Value Ref Range    Color, UA Yellow Straw/Yellow    Clarity, UA SL CLOUDY (A) Clear    Glucose, Ur Negative Negative mg/dL    Bilirubin Urine Negative Negative    Ketones, Urine TRACE (A) Negative mg/dL    Specific Gravity, UA 1.020 1.005 - 1.030    Blood, Urine Negative Negative    pH, UA 7.5 5.0 - 8.0    Protein,  (A) Negative mg/dL    Urobilinogen, Urine 1.0 <2.0 E.U./dL    Nitrite, Urine Negative Negative    Leukocyte Esterase, Urine Negative Negative    Microscopic Examination YES     Urine Type Not Specified    Procalcitonin   Result Value Ref Range    Procalcitonin 0.04 0.00 - 0.15 ng/mL   Lactate, Sepsis   Result Value Ref Range    Lactic Acid, Sepsis 9.7 (HH) 0.4 - 1.9 mmol/L   Microscopic Urinalysis   Result Value Ref Range    Mucus, UA Rare (A) /LPF    WBC, UA 3-5 0 - 5 /HPF    RBC, UA 0-2 0 - 2 /HPF    Epi Cells 3-5 /HPF    Bacteria, UA Rare (A) /HPF    Amorphous, UA 3+ (A) /HPF    Crystals Few Triple Phos (A) /HPF   EKG 12 Lead   Result Value Ref Range    Ventricular Rate 80 BPM    Atrial Rate 80 BPM    P-R Interval 146 ms    QRS Duration 74 ms    Q-T Interval 384 ms    QTc Calculation (Bazett) 442 ms    P Axis 71 degrees    R Axis 88 degrees    T Axis 81 degrees    Diagnosis       Normal sinus rhythmST elevation, consider early repolarization, pericarditis, or injuryNonspecific ST abnormalityAbnormal ECGNo previous ECGs available       PROCEDURES      MEDICAL DECISION MAKING    Procedures/interventions/images ordered for this visit  Orders Placed This Encounter   Procedures    Urine Culture    Culture blood #1

## 2018-12-11 NOTE — H&P
ICU HISTORY AND PHYSICAL  PGY- 1     Hospital Day: 1  ICU Day: 1                                                         Code:Full Code  Admit Date: 12/10/2018  PCP: Indigo Wagner MD                                  CC: seizures    HISTORYOF PRESENT ILLNESS:   Patient is a 41year old male that presents from a SNF for concerns of seizure like activity. He has a past medical history of severe mental retardation, history of Blindness (right prosthetic eye), Diabetes mellitus, Epilepsy; Extreme hearing loss. He was brought in from his SNF because he needs help with almost all his ADL's. Staff at the facility said that they witnessed a two minute seizure which involved rhythmic muscle contractions. There was two minutes between the first and second seizure. There was no return to baseline. One dose of diazepam was given and there was no resplution of symptoms. He is seen by neurology for his seizure disorder. They are classically described as Generalized body stiffening and jerking for 2 minutes. +UI +Drooling -UI +Fatigue. He is on a number of antiepileptics with no resolution. Current AEDs: Carbatrol 400 mg BID, Gabapentin 300 mg QID, Lamictal BRAND 400/450, Zonisamide 400 mg qhs, PRN Diastat. EEG: Generalized slow activity with independent spikes in left frontal-temporal and right temporal regions. He has quite an extensive history of emergency room visits for seizure like activity. The SNF was called this morning and the caregiver was off shift. No ROS of system was obtained due to non verbal nature.      PAST HISTORY:     Past Medical History:   Diagnosis Date    Acne     Allergic rhinitis     Blindness, legal     Constipation     Dehydration     Dermatitis     Diabetes mellitus ([address]     Epilepsy ([address]     GERD (gastroesophageal reflux disease)     GERD (gastroesophageal reflux disease)     Hearing loss     Hypokalemia     Hypothyroidism     Impulse control disorder     Incontinence     insulin aspart (NOVOLOG) 100 UNIT/ML injection pen Inject into the skin 3 times daily (before meals) Sliding scale  7,12,16   3 units  150-199 4 units  200-249  5 units  250-299  6 units  300-349  7 units  350-399  8 units  400 and above, 8 units and call MD  350-399 5 units  400 or greater call MD      clindamycin (CLEOCIN T) 1 % lotion Apply topically as needed Apply topically 2 times daily.  glucagon 1 MG injection Infuse 1 kit intravenously once If pt is unresponsive and BS less than 50      glucose (GLUTOSE) 40 % GEL Take 15 g by mouth See Admin Instructions if pt is symptomatic and BS less than 50      omeprazole (PRILOSEC) 20 MG capsule Take 20 mg by mouth daily.  lamoTRIgine (LAMICTAL) 25 MG tablet Take 50 mg by mouth nightly       sertraline (ZOLOFT) 50 MG tablet Take 50 mg by mouth nightly.  zonisamide (ZONEGRAN) 100 MG capsule Take 400 mg by mouth daily.  simethicone (MYLICON) 80 MG chewable tablet Take 80 mg by mouth three times daily.  Benzoyl Peroxide-Cleanser (LAVOCLEN-4 ACNE WASH) 4 % KIT Apply  topically daily. Use as directed every morning to acne.  Sodium Phosphates (FLEET ENEMA) 7-19 GM/118ML ENEM Place 118 mLs rectally as needed. Insert 1 rectally as needed for constipation if no BM in 4 Days (x1)       aspirin 81 MG chewable tablet Take 81 mg by mouth daily.  docusate sodium (COLACE) 100 MG capsule Take 100 mg by mouth 2 times daily.  Melatonin 3 MG TABS Take 3 mg by mouth nightly.  polyethylene glycol (GLYCOLAX) powder Take 17 g by mouth every other day.  Acetaminophen (TYLENOL PO) Take  by mouth. 650 po/pr q4hr prn pain/fever       bisacodyl (DULCOLAX) 10 MG suppository Place 10 mg rectally daily as needed.              Scheduled Meds:   lamoTRIgine  450 mg Oral Nightly    lamoTRIgine  400 mg Oral QAM    aspirin  81 mg Oral Daily    carBAMazepine  400 mg Oral Q12H    gabapentin  300 mg Oral 4x Daily    levothyroxine  75 mcg Oral Daily    sertraline  50 mg Oral Nightly    zonisamide  400 mg Oral Daily    sodium chloride flush  10 mL Intravenous 2 times per day    enoxaparin  40 mg Subcutaneous Daily    insulin glargine  12 Units Subcutaneous Nightly    insulin lispro  0-6 Units Subcutaneous TID WC    insulin lispro  0-3 Units Subcutaneous Nightly      Continuous Infusions:   dextrose       PRN Meds:sodium chloride flush, magnesium hydroxide, ondansetron, glucose, dextrose, glucagon (rDNA), dextrose    Allergies: Allergies   Allergen Reactions    Polymyxin B Other (See Comments)     Unknown reaction    Polytrim [Polymyxin B-Trimethoprim]        REVIEW OF SYSTEMS:       History obtained from unobtainable from patient due to mental status    Review of Systems    PHYSICAL EXAM:       Vitals: /83   Pulse 67   Temp 98.2 °F (36.8 °C) (Axillary)   Resp 17   Ht 5' 6.93\" (1.7 m)   Wt 110 lb 14.3 oz (50.3 kg)   SpO2 (!) 89%   BMI 17.40 kg/m²     I/O:  No intake or output data in the 24 hours ending 12/11/18 0422  No intake/output data recorded. No intake/output data recorded. PhysicalExamination:     Physical Exam   Constitutional: He appears cachectic. No distress. HENT:   Head: Head is with contusion. Contusion over eye due to recent fall. Eyes: Right eye exhibits abnormal extraocular motion. Left eye exhibits normal extraocular motion. Right pupil is not round and not reactive. Left pupil is round and reactive. Pupils are unequal.   Artificial eye on the right. Cardiovascular: Normal rate, regular rhythm, normal heart sounds and intact distal pulses. Pulmonary/Chest: Effort normal and breath sounds normal. No respiratory distress. He has no wheezes. Abdominal: Soft. Bowel sounds are normal.   Musculoskeletal: He exhibits edema and deformity. Contractures. Neurological: He is unresponsive. Skin: He is not diaphoretic. Psychiatric: He is withdrawn.  Cognition and memory are

## 2018-12-11 NOTE — CONSULTS
[facility]  Palliative Medicine Consultation Note      Date Of Admission:12/10/2018  Date of consult: 12/11/18  Seen by [facility] in the past:  No    Recommendations:        PC and PCSW did see patient, he was currently being attached to cEEG. Received an update from Nursing and Dr. Susan Aquino. PC SW did reach out to patient's mom, please see her note. We will continue to discuss goals of care with patient's mom and update her with his current medical condition. 1. Goals of Care/Advanced Care planning/Code status: Full code- mom is legal decision maker. 2. Pain: patient did not seem in acute pain at the time of assessment. 3. SOB: patient did not seem to be in respiratory distress today  4. Seizure activity: patient is currently being followed by Neurology and he is on cEEG. 5. Disposition: from a group home       Reason for Consult:         __x___ Goals of Care  __x___ Code Status Discussion/Advanced Care Planning   __x___ Psychosocial/Family Support  __x___ Symptom Management: (Specify Symptom)    _____ Other (Specify)    Requesting Physician:  Dr. Matthews Job:  Seizures     History Obtained From:  mother, electronic medical record      History of Present Illness:         Patient is a 41year old male with a Pmhx for blindness, MRDD, MRSA, PNA, GERD, hearing loss, and microcephaly who presented from his group home with concerns of seizure like activity. He was brought in from his Group home because he needs help with almost all his ADL's and the staff at the group home reported that they witnessed a two minute seizure which involved rhythmic muscle contractions. He then had another seizure- and there was two minutes between the the first and second seizure. There was no return to baseline. He was given medications and admitted for further evaluation.      Subjective:                     Past Medical History:        Diagnosis Date    Acne     Allergic rhinitis    

## 2018-12-11 NOTE — CONSULTS
Clinical Pharmacy Consult Note  Medication Reconciliation      REQUESTING PHYSICIAN/CNP: Sergo Salazar MD    ADMIT DATE:   12/10/2018       SUBJECTIVE:  Pharmacy asked to perform medication reconciliation     The sources of the home medication list include nursing home records and outside hospital records      Medications corrected on home medication list:   Novolog Sliding scale    200-299 3 units    300-399 4 units    400-499 5 units    >500  Call MD Navarro 9 units qAM    Medications added to the home medication list:   None    Medications removed from the home medication list:   Simethecone 80 mg N9S PRN (duplicate entry)    Last doses taken:   12/1018 2000    The home medication list has been updated in EPIC. If the patient's home medications have been re-ordered prior to this note, the physician will need to re-reconcile the admission orders to reflect the changes made. Thank you for consulting Pharmacy. Please call with any questions.      Khari Ojeda, PharmD, [facility]  Phone: [phone]  12/11/2018 11:36 AM

## 2018-12-11 NOTE — PROGRESS NOTES
ICU Progress Note    Admit Date: 12/10/2018  Hospital Day: 2    ICU DAY  Code:Full Code  PCP: Samantha Ventura MD            SUBJECTIVE:   Interval Hx: Overnight there were no witness seizure like activity by the patient. He did have moments where he got on his knees which seems to be the typical behavior for him at baseline. Otherwise there have been large periods where he has mostly slept. Due to his large communication restrictions he does sometimes get agitated. Neurology saw his this morning and most likely want a cvEEG placed for at least 12 hours to determine if hes still in status epilepticus. MEDICATIONS:   Scheduled Meds:   lamoTRIgine  450 mg Oral Nightly    lamoTRIgine  400 mg Oral QAM    lacosamide  100 mg Intravenous BID    lacosamide (VIMPAT) IVPB  150 mg Intravenous Once    aspirin  81 mg Oral Daily    carBAMazepine  400 mg Oral Q12H    gabapentin  300 mg Oral 4x Daily    levothyroxine  75 mcg Oral Daily    sertraline  50 mg Oral Nightly    zonisamide  400 mg Oral Daily    sodium chloride flush  10 mL Intravenous 2 times per day    enoxaparin  40 mg Subcutaneous Daily    insulin glargine  12 Units Subcutaneous Nightly    insulin lispro  0-6 Units Subcutaneous TID WC    insulin lispro  0-3 Units Subcutaneous Nightly      Continuous Infusions:   dextrose       PRN Meds:LORazepam, sodium chloride flush, magnesium hydroxide, ondansetron, glucose, dextrose, glucagon (rDNA), dextrose  Allergies: Allergies   Allergen Reactions    Polymyxin B Other (See Comments)     Unknown reaction    Polytrim [Polymyxin B-Trimethoprim]        PHYSICAL EXAM:       Vitals: BP 96/73   Pulse 72   Temp 98.3 °F (36.8 °C) (Axillary)   Resp 16   Ht 5' 6.93\" (1.7 m)   Wt 110 lb 14.3 oz (50.3 kg)   SpO2 96%   BMI 17.40 kg/m²   I/O:  No intake or output data in the 24 hours ending 12/11/18 0831  No intake/output data recorded. No intake/output data recorded.     Physical Examination:   Physical Exam Status:Full Code  PPX: lovenox  DISPO: ICU  -----------------------------  Joey Sadler MD, PGY - [facility]  12/11/2018 8:31 AM

## 2018-12-11 NOTE — PROGRESS NOTES
CONTINUOUS EEG    Name:  Ch[facility] Record Number:  [id number]  Age: 41 y.o. Gender: male  : 1975  Today's Date:  2018  Room:  [facility]  Vital Signs   /81   Pulse 57   Temp 98 °F (36.7 °C) (Axillary)   Resp 15   Ht 5' 6.93\" (1.7 m)   Wt 110 lb 14.3 oz (50.3 kg)   SpO2 96%   BMI 17.40 kg/m²       Patient currently on continuous EEG monitoring. All EEG leads are currently in place with no current issues. Verified [employer] connection via team viewer. Checked in with patient RN for current plan of care. Comments: Continue monitoring. All leads within 10K limit.     Electronically signed by Cynthia Franklin on 2018 at 4:34 PM

## 2018-12-11 NOTE — CONSULTS
temporal head regions independently which are consistent with areas of focal cortical irritability and a process of epileptogenic potential. His current antiepileptic drug regimen consist of Carbatrol 400 mg BID, Gabapentin 300 mg QID, Lamictal 400 mg qam/450 mg qpm, Zonisamide 400 mg qhs and PRN Diastat. 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  Unclear is the patient's baseline mental status. Per nursing staff he has attempted a couple of times to get on his knees. He is unable to take p.o. meds at this time as per ICU physician resident. No further seizures reported but unclear if the patient has returned to his baseline status. Past Medical History:   has a past medical history of Acne; Allergic rhinitis; Blindness, legal; Constipation; Dehydration; Dermatitis; Diabetes mellitus ([address] Epilepsy ([address] GERD (gastroesophageal reflux disease); GERD (gastroesophageal reflux disease); Hearing loss; Hypokalemia; Hypothyroidism; Impulse control disorder; Incontinence; Insomnia; Mental retardation; Mental retardation, profound (I.Q. < 20); Microcephaly ([address] MRSA (methicillin resistant staph aureus) culture positive; Non-verbal learning disorder; PAD (peripheral artery disease) ([address] Pneumonia; Scoliosis; and Thyroid disease.   Patient Active Problem List   Diagnosis    Epilepsy ([address]    Mental retardation    Blindness, legal    Aspiration pneumonia ([address]    DM2 (diabetes mellitus, type 2) ([address]    Sepsis ([address]    DM (diabetes mellitus) ([address]    CAP (community acquired pneumonia)    Infection of thumb    Hypernatremia    Acute hypernatremia    Acute encephalopathy    Hypokalemia    Seizure disorder ([address]    Hyperglycemia    Abnormal finding on GI tract imaging    Status epilepticus ([address] insulin glargine (LANTUS) 100 UNIT/ML injection vial, Inject 12 Units into the skin every morning   levothyroxine (SYNTHROID) 75 MCG tablet, Take 75 mcg by mouth Daily  insulin aspart (NOVOLOG) 100 UNIT/ML injection pen, Inject into the skin 3 times daily (before meals) Sliding scale  7,12,16  3 units 150-199 4 units 200-249  5 units 250-299  6 units 300-349  7 units 350-399  8 units 400 and above, 8 units and call -399 5 units 400 or greater call MD  clindamycin (CLEOCIN T) 1 % lotion, Apply topically as needed Apply topically 2 times daily. glucagon 1 MG injection, Infuse 1 kit intravenously once If pt is unresponsive and BS less than 50  glucose (GLUTOSE) 40 % GEL, Take 15 g by mouth See Admin Instructions if pt is symptomatic and BS less than 50  omeprazole (PRILOSEC) 20 MG capsule, Take 20 mg by mouth daily. lamoTRIgine (LAMICTAL) 25 MG tablet, Take 50 mg by mouth nightly   sertraline (ZOLOFT) 50 MG tablet, Take 50 mg by mouth nightly. zonisamide (ZONEGRAN) 100 MG capsule, Take 400 mg by mouth daily. simethicone (MYLICON) 80 MG chewable tablet, Take 80 mg by mouth three times daily. Benzoyl Peroxide-Cleanser (LAVOCLEN-4 ACNE WASH) 4 % KIT, Apply  topically daily. Use as directed every morning to acne. Sodium Phosphates (FLEET ENEMA) 7-19 GM/118ML ENEM, Place 118 mLs rectally as needed. Insert 1 rectally as needed for constipation if no BM in 4 Days (x1)   aspirin 81 MG chewable tablet, Take 81 mg by mouth daily. docusate sodium (COLACE) 100 MG capsule, Take 100 mg by mouth 2 times daily. Melatonin 3 MG TABS, Take 3 mg by mouth nightly. polyethylene glycol (GLYCOLAX) powder, Take 17 g by mouth every other day. Acetaminophen (TYLENOL PO), Take  by mouth. 650 po/pr q4hr prn pain/fever   bisacodyl (DULCOLAX) 10 MG suppository, Place 10 mg rectally daily as needed. Review of Systems:  ROS:  Unable to obtain due to his current mentation.      OBJECTIVE   Neurological negatives are addressed in Assessment & Plan section of note      Laboratory Review: All results below personally reviewed.  Pertinent positives & negatives are addressed in Assessment & Plan section of note  Recent Results (from the past 36 hour(s))   CBC Auto Differential    Collection Time: 12/10/18  5:40 PM   Result Value Ref Range    WBC 6.5 4.0 - 11.0 K/uL    RBC 4.85 4.20 - 5.90 M/uL    Hemoglobin 14.9 13.5 - 17.5 g/dL    Hematocrit 45.9 40.5 - 52.5 %    MCV 94.6 80.0 - 100.0 fL    MCH 30.8 26.0 - 34.0 pg    MCHC 32.5 31.0 - 36.0 g/dL    RDW 15.5 (H) 12.4 - 15.4 %    Platelets 821 696 - 045 K/uL    MPV 7.9 5.0 - 10.5 fL    Neutrophils % 47.3 %    Lymphocytes % 46.9 %    Monocytes % 4.7 %    Eosinophils % 0.5 %    Basophils % 0.6 %    Neutrophils # 3.1 1.7 - 7.7 K/uL    Lymphocytes # 3.0 1.0 - 5.1 K/uL    Monocytes # 0.3 0.0 - 1.3 K/uL    Eosinophils # 0.0 0.0 - 0.6 K/uL    Basophils # 0.0 0.0 - 0.2 K/uL   Comprehensive Metabolic Panel w/ Reflex to MG    Collection Time: 12/10/18  5:40 PM   Result Value Ref Range    Sodium 140 136 - 145 mmol/L    Potassium reflex Magnesium 3.8 3.5 - 5.1 mmol/L    Chloride 104 99 - 110 mmol/L    CO2 19 (L) 21 - 32 mmol/L    Anion Gap 17 (H) 3 - 16    Glucose 86 70 - 99 mg/dL    BUN 17 7 - 20 mg/dL    CREATININE 1.2 0.9 - 1.3 mg/dL    GFR Non-African American >60 >60    GFR African American >60 >60    Calcium 9.4 8.3 - 10.6 mg/dL    Total Protein 7.8 6.4 - 8.2 g/dL    Alb 4.2 3.4 - 5.0 g/dL    Albumin/Globulin Ratio 1.2 1.1 - 2.2    Total Bilirubin <0.2 0.0 - 1.0 mg/dL    Alkaline Phosphatase 209 (H) 40 - 129 U/L    ALT 18 10 - 40 U/L    AST 19 15 - 37 U/L    Globulin 3.6 g/dL   Procalcitonin    Collection Time: 12/10/18  5:40 PM   Result Value Ref Range    Procalcitonin 0.04 0.00 - 0.15 ng/mL   Lactate, Sepsis    Collection Time: 12/10/18  5:40 PM   Result Value Ref Range    Lactic Acid, Sepsis 9.7 (HH) 0.4 - 1.9 mmol/L   Carbamazepine level, total    Collection Time: 12/10/18

## 2018-12-12 LAB
A/G RATIO: 1.2 (ref 1.1–2.2)
ALBUMIN SERPL-MCNC: 4.2 G/DL (ref 3.4–5)
ALP BLD-CCNC: 208 U/L (ref 40–129)
ALT SERPL-CCNC: 17 U/L (ref 10–40)
ANION GAP SERPL CALCULATED.3IONS-SCNC: 13 MMOL/L (ref 3–16)
AST SERPL-CCNC: 21 U/L (ref 15–37)
BASOPHILS ABSOLUTE: 0 K/UL (ref 0–0.2)
BASOPHILS RELATIVE PERCENT: 0.5 %
BILIRUB SERPL-MCNC: 0.3 MG/DL (ref 0–1)
BUN BLDV-MCNC: 12 MG/DL (ref 7–20)
CALCIUM SERPL-MCNC: 9.4 MG/DL (ref 8.3–10.6)
CHLORIDE BLD-SCNC: 106 MMOL/L (ref 99–110)
CO2: 24 MMOL/L (ref 21–32)
CREAT SERPL-MCNC: 0.8 MG/DL (ref 0.9–1.3)
EOSINOPHILS ABSOLUTE: 0.1 K/UL (ref 0–0.6)
EOSINOPHILS RELATIVE PERCENT: 1 %
GFR AFRICAN AMERICAN: >60
GFR NON-AFRICAN AMERICAN: >60
GLOBULIN: 3.6 G/DL
GLUCOSE BLD-MCNC: 119 MG/DL (ref 70–99)
GLUCOSE BLD-MCNC: 122 MG/DL (ref 70–99)
GLUCOSE BLD-MCNC: 199 MG/DL (ref 70–99)
GLUCOSE BLD-MCNC: 236 MG/DL (ref 70–99)
GLUCOSE BLD-MCNC: 260 MG/DL (ref 70–99)
GLUCOSE BLD-MCNC: 297 MG/DL (ref 70–99)
GLUCOSE BLD-MCNC: 65 MG/DL (ref 70–99)
GLUCOSE BLD-MCNC: 69 MG/DL (ref 70–99)
HCT VFR BLD CALC: 43.3 % (ref 40.5–52.5)
HEMOGLOBIN: 14.3 G/DL (ref 13.5–17.5)
LYMPHOCYTES ABSOLUTE: 2.1 K/UL (ref 1–5.1)
LYMPHOCYTES RELATIVE PERCENT: 37.5 %
MCH RBC QN AUTO: 30.7 PG (ref 26–34)
MCHC RBC AUTO-ENTMCNC: 33 G/DL (ref 31–36)
MCV RBC AUTO: 92.9 FL (ref 80–100)
MONOCYTES ABSOLUTE: 0.4 K/UL (ref 0–1.3)
MONOCYTES RELATIVE PERCENT: 6.8 %
NEUTROPHILS ABSOLUTE: 3.1 K/UL (ref 1.7–7.7)
NEUTROPHILS RELATIVE PERCENT: 54.2 %
PDW BLD-RTO: 15.1 % (ref 12.4–15.4)
PERFORMED ON: ABNORMAL
PLATELET # BLD: 121 K/UL (ref 135–450)
PMV BLD AUTO: 8 FL (ref 5–10.5)
POTASSIUM REFLEX MAGNESIUM: 4.4 MMOL/L (ref 3.5–5.1)
RBC # BLD: 4.66 M/UL (ref 4.2–5.9)
SODIUM BLD-SCNC: 143 MMOL/L (ref 136–145)
TOTAL CK: 83 U/L (ref 39–308)
TOTAL PROTEIN: 7.8 G/DL (ref 6.4–8.2)
URINE CULTURE, ROUTINE: NORMAL
WBC # BLD: 5.7 K/UL (ref 4–11)

## 2018-12-12 PROCEDURE — 6370000000 HC RX 637 (ALT 250 FOR IP): Performed by: STUDENT IN AN ORGANIZED HEALTH CARE EDUCATION/TRAINING PROGRAM

## 2018-12-12 PROCEDURE — 80175 DRUG SCREEN QUAN LAMOTRIGINE: CPT

## 2018-12-12 PROCEDURE — 2580000003 HC RX 258: Performed by: NURSE PRACTITIONER

## 2018-12-12 PROCEDURE — 95951 HC EEG MONITOR/VIDEO LESS THAN 24: CPT

## 2018-12-12 PROCEDURE — 6360000002 HC RX W HCPCS: Performed by: NURSE PRACTITIONER

## 2018-12-12 PROCEDURE — 80053 COMPREHEN METABOLIC PANEL: CPT

## 2018-12-12 PROCEDURE — 1200000000 HC SEMI PRIVATE

## 2018-12-12 PROCEDURE — 85025 COMPLETE CBC W/AUTO DIFF WBC: CPT

## 2018-12-12 PROCEDURE — 2580000003 HC RX 258: Performed by: INTERNAL MEDICINE

## 2018-12-12 PROCEDURE — 2580000003 HC RX 258: Performed by: STUDENT IN AN ORGANIZED HEALTH CARE EDUCATION/TRAINING PROGRAM

## 2018-12-12 PROCEDURE — 36415 COLL VENOUS BLD VENIPUNCTURE: CPT

## 2018-12-12 PROCEDURE — 82550 ASSAY OF CK (CPK): CPT

## 2018-12-12 PROCEDURE — C9254 INJECTION, LACOSAMIDE: HCPCS | Performed by: NURSE PRACTITIONER

## 2018-12-12 PROCEDURE — 6360000002 HC RX W HCPCS: Performed by: STUDENT IN AN ORGANIZED HEALTH CARE EDUCATION/TRAINING PROGRAM

## 2018-12-12 PROCEDURE — 99232 SBSQ HOSP IP/OBS MODERATE 35: CPT | Performed by: INTERNAL MEDICINE

## 2018-12-12 RX ORDER — SODIUM CHLORIDE 9 MG/ML
INJECTION, SOLUTION INTRAVENOUS CONTINUOUS
Status: DISCONTINUED | OUTPATIENT
Start: 2018-12-12 | End: 2018-12-13

## 2018-12-12 RX ORDER — SODIUM CHLORIDE 9 MG/ML
INJECTION, SOLUTION INTRAVENOUS
Status: DISPENSED
Start: 2018-12-12 | End: 2018-12-12

## 2018-12-12 RX ADMIN — DEXTROSE MONOHYDRATE 100 MG: 50 INJECTION, SOLUTION INTRAVENOUS at 11:40

## 2018-12-12 RX ADMIN — GABAPENTIN 300 MG: 300 CAPSULE ORAL at 10:09

## 2018-12-12 RX ADMIN — LEVOTHYROXINE SODIUM 75 MCG: 75 TABLET ORAL at 07:02

## 2018-12-12 RX ADMIN — INSULIN LISPRO 1 UNITS: 100 INJECTION, SOLUTION INTRAVENOUS; SUBCUTANEOUS at 14:12

## 2018-12-12 RX ADMIN — LAMOTRIGINE 400 MG: 150 TABLET ORAL at 10:08

## 2018-12-12 RX ADMIN — Medication 10 ML: at 20:50

## 2018-12-12 RX ADMIN — DEXTROSE MONOHYDRATE 12.5 G: 25 INJECTION, SOLUTION INTRAVENOUS at 10:38

## 2018-12-12 RX ADMIN — CARBAMAZEPINE 400 MG: 200 CAPSULE, EXTENDED RELEASE ORAL at 11:41

## 2018-12-12 RX ADMIN — SODIUM CHLORIDE: 9 INJECTION, SOLUTION INTRAVENOUS at 00:48

## 2018-12-12 RX ADMIN — ZONISAMIDE 400 MG: 100 CAPSULE ORAL at 10:09

## 2018-12-12 RX ADMIN — GABAPENTIN 300 MG: 300 CAPSULE ORAL at 20:50

## 2018-12-12 RX ADMIN — ASPIRIN 81 MG 81 MG: 81 TABLET ORAL at 10:09

## 2018-12-12 RX ADMIN — INSULIN LISPRO 2 UNITS: 100 INJECTION, SOLUTION INTRAVENOUS; SUBCUTANEOUS at 19:43

## 2018-12-12 RX ADMIN — SERTRALINE HYDROCHLORIDE 50 MG: 50 TABLET ORAL at 20:50

## 2018-12-12 RX ADMIN — INSULIN LISPRO 2 UNITS: 100 INJECTION, SOLUTION INTRAVENOUS; SUBCUTANEOUS at 21:34

## 2018-12-12 RX ADMIN — ENOXAPARIN SODIUM 40 MG: 40 INJECTION SUBCUTANEOUS at 10:09

## 2018-12-12 RX ADMIN — INSULIN GLARGINE 10 UNITS: 100 INJECTION, SOLUTION SUBCUTANEOUS at 21:35

## 2018-12-12 RX ADMIN — Medication 10 ML: at 10:10

## 2018-12-12 RX ADMIN — GABAPENTIN 300 MG: 300 CAPSULE ORAL at 14:11

## 2018-12-12 RX ADMIN — DEXTROSE MONOHYDRATE 50 MG: 50 INJECTION, SOLUTION INTRAVENOUS at 23:01

## 2018-12-12 RX ADMIN — LAMOTRIGINE 450 MG: 150 TABLET ORAL at 20:49

## 2018-12-12 ASSESSMENT — PAIN SCALES - WONG BAKER
WONGBAKER_NUMERICALRESPONSE: 0

## 2018-12-12 NOTE — PROGRESS NOTES
Head is with contusion. Contusion over eye due to recent fall. Eyes: Right eye exhibits abnormal extraocular motion. Left eye exhibits normal extraocular motion. Right pupil is not round and not reactive. Left pupil is round and reactive. Pupils are unequal.   Artificial eye on the right. Cardiovascular: Normal rate, regular rhythm, normal heart sounds and intact distal pulses. Pulmonary/Chest: Effort normal and breath sounds normal. No respiratory distress. He has no wheezes. Abdominal: Soft. Bowel sounds are normal.   Musculoskeletal: He exhibits edema and deformity. Contractures. Neurological: He is unresponsive. Skin: He is not diaphoretic. Psychiatric: He is withdrawn. Cognition and memory are impaired. He is noncommunicative. Vent Settings:   / / /     DATA:       Labs:  CBC:   Recent Labs      12/10/18   1740  12/11/18   0423  12/12/18   0504   WBC  6.5  10.0  5.7   HGB  14.9  14.4  14.3   HCT  45.9  45.0  43.3   PLT  167  155  121*       BMP:   Recent Labs      12/10/18   1740  12/11/18   0423  12/12/18   0505   NA  140  143  143   K  3.8  4.2  4.4   CL  104  109  106   CO2  19*  22  24   BUN  17  14  12   CREATININE  1.2  0.9  0.8*   GLUCOSE  86  81  119*     ABGs: No results for input(s): PHART, PZG6VYE, PO2ART in the last 72 hours. ASSESSMENT AND PLAN:     Seizure like activity with known history of epilepsy  - Witnessed seizure at group home with post ictal behavioral changes   - baseline is self injurious behavior, hyperactivity with high blood sugars.    - Continue carbamazepine 400 mg BID   - Continue Gabapentin 300 Q6H   - Lamotrigine 400 mg every morning, 450 at night   - Zonisamide 400 daily   - wean off vimpat for discharge per neurology   - Will discharge tomorrow if remains seizure free for another 24 hours    - Neurology consult   - PRN ativan 4 mg  - No seizure like activity on cvEEG, so discontinued    Diabetes  - Very labile control   - Continue home Lantus 12 units  - low dose sliding scale insulin   - tighter control due to lack of PO intake     Hypothyroidism  - Levothyroxine 75     Code Status:Full Code  PPX: lovenox  DISPO: ICU  -----------------------------  Inell MD Delmer, PGY - [facility]  12/12/2018 1:17 PM

## 2018-12-12 NOTE — PROGRESS NOTES
Neurology Follow Up  Note    Updates:  Seen for status epilepticus. He has not had any further reported seizures. His CVEEG with no electrographic seizures. Started to take his PO medications last night. ROS:  Unable to obtain as he is nonverbal.     Exam:  Blood pressure 111/76, pulse 69, temperature 98.5 °F (36.9 °C), temperature source Axillary, resp. rate 15, height 5' 6.93\" (1.7 m), weight 107 lb 5.8 oz (48.7 kg), SpO2 100 %. Constitutional    Vital signs: BP, HR, and RR reviewed   General Alert, no distress, well-nourished  Eyes: fundoscopic exam not visualized. .   Cardiovascular: pulses symmetric in all 4 extremities. No peripheral edema. Psychiatric: Unable to cooperate with examination, no  psychotic behavior noted. Neurologic  Mental status:    orientation limited examination due to encephalopathy. He moves to tactile stimulation.               General fund of knowledge  limited examination due to encephalopathy.              Memory  limited examination due to encephalopathy.              Attention  limited examination due to encephalopathy.              Language  limited examination due to encephalopathy.              Comprehension  limited examination due to encephalopathy. Cranial nerves:   CN2:  limited examination due to encephalopathy. CN 3,4,6:  limited examination due to encephalopathy. CN5:  limited examination due to encephalopathy. CN7: unable to assess. CN8: hearing grossly intact  CN9:  limited examination due to encephalopathy. CN11:  limited examination due to encephalopathy. CN12: limited examination due to encephalopathy. Strength: Moving bilateral arms and legs  to tactile stimulation. Unable to test strength. Deep tendon reflexes: hyporeflexia. Sensory: withdraws to tactile stimulation. Cerebellar/coordination:  limited examination due to encephalopathy. Tone: no rigidity. Gait: Deferred at this time due to safety concerns.      Labs:  Hepatic:   Recent ELIZABET-CNP  [facility]  [phone]  Evenings, weekends, and off weeks please discuss with the covering neurologist.

## 2018-12-12 NOTE — PROGRESS NOTES
Palliative Care Chart Review  and Check in Note:     NAME:  Joseph Murillo Date: 12/10/2018  Hospital Day:  Hospital Day: 3   Current Code status: Full Code    Palliative care is continuing to following Mr. Barbara Flannery for symptom management,  and goals of care discussion as needed. Patient's chart reviewed today 12/12/18. The following are the currently established goals/code status, and Symptom management. Goals of care: Patient appears to be close to his baseline functional status. PCSW attempted to address code status and goals of care with patient's mother yesterday on the phone with no success and PC did review case with Dr. Negar Jacobsen who also attempted to address code status last evening with her and at this time, patient's mother wants him to remain a full code. Code status: Full code      Discharge plan: Patient will likely return to his group home at discharge. The RN/CM or SW on the designated floor is facilitating and executing the discharge of this patient.      ELIZABET Long/CNP  Palliative Care  [phone]

## 2018-12-13 VITALS
SYSTOLIC BLOOD PRESSURE: 101 MMHG | RESPIRATION RATE: 14 BRPM | DIASTOLIC BLOOD PRESSURE: 64 MMHG | HEART RATE: 64 BPM | TEMPERATURE: 97.2 F | HEIGHT: 67 IN | OXYGEN SATURATION: 100 % | WEIGHT: 104.5 LBS | BODY MASS INDEX: 16.4 KG/M2

## 2018-12-13 LAB
GLUCOSE BLD-MCNC: 110 MG/DL (ref 70–99)
GLUCOSE BLD-MCNC: 141 MG/DL (ref 70–99)
GLUCOSE BLD-MCNC: 328 MG/DL (ref 70–99)
GLUCOSE BLD-MCNC: 63 MG/DL (ref 70–99)
GLUCOSE BLD-MCNC: 78 MG/DL (ref 70–99)
LAMOTRIGINE LEVEL: 9.3 UG/ML (ref 2.5–15)
PERFORMED ON: ABNORMAL
PERFORMED ON: NORMAL

## 2018-12-13 PROCEDURE — 6360000002 HC RX W HCPCS: Performed by: NURSE PRACTITIONER

## 2018-12-13 PROCEDURE — 6370000000 HC RX 637 (ALT 250 FOR IP): Performed by: STUDENT IN AN ORGANIZED HEALTH CARE EDUCATION/TRAINING PROGRAM

## 2018-12-13 PROCEDURE — 6360000002 HC RX W HCPCS: Performed by: STUDENT IN AN ORGANIZED HEALTH CARE EDUCATION/TRAINING PROGRAM

## 2018-12-13 PROCEDURE — 2580000003 HC RX 258: Performed by: STUDENT IN AN ORGANIZED HEALTH CARE EDUCATION/TRAINING PROGRAM

## 2018-12-13 PROCEDURE — 2580000003 HC RX 258: Performed by: NURSE PRACTITIONER

## 2018-12-13 PROCEDURE — C9254 INJECTION, LACOSAMIDE: HCPCS | Performed by: NURSE PRACTITIONER

## 2018-12-13 RX ORDER — ZONISAMIDE 100 MG/1
400 CAPSULE ORAL DAILY
Qty: 30 CAPSULE | Refills: 3 | Status: SHIPPED | OUTPATIENT
Start: 2018-12-14

## 2018-12-13 RX ORDER — LAMOTRIGINE 200 MG/1
400 TABLET ORAL EVERY MORNING
Qty: 30 TABLET | Refills: 3 | Status: SHIPPED | OUTPATIENT
Start: 2018-12-14

## 2018-12-13 RX ORDER — LAMOTRIGINE 150 MG/1
450 TABLET ORAL NIGHTLY
Qty: 30 TABLET | Refills: 3 | Status: SHIPPED | OUTPATIENT
Start: 2018-12-13

## 2018-12-13 RX ADMIN — GABAPENTIN 300 MG: 300 CAPSULE ORAL at 08:39

## 2018-12-13 RX ADMIN — Medication 10 ML: at 09:00

## 2018-12-13 RX ADMIN — DEXTROSE MONOHYDRATE 50 MG: 50 INJECTION, SOLUTION INTRAVENOUS at 12:29

## 2018-12-13 RX ADMIN — ASPIRIN 81 MG 81 MG: 81 TABLET ORAL at 08:39

## 2018-12-13 RX ADMIN — ENOXAPARIN SODIUM 40 MG: 40 INJECTION SUBCUTANEOUS at 08:44

## 2018-12-13 RX ADMIN — CARBAMAZEPINE 400 MG: 200 CAPSULE, EXTENDED RELEASE ORAL at 00:28

## 2018-12-13 RX ADMIN — CARBAMAZEPINE 400 MG: 200 CAPSULE, EXTENDED RELEASE ORAL at 11:30

## 2018-12-13 RX ADMIN — LAMOTRIGINE 400 MG: 150 TABLET ORAL at 08:40

## 2018-12-13 RX ADMIN — GABAPENTIN 300 MG: 300 CAPSULE ORAL at 14:29

## 2018-12-13 RX ADMIN — INSULIN LISPRO 4 UNITS: 100 INJECTION, SOLUTION INTRAVENOUS; SUBCUTANEOUS at 11:31

## 2018-12-13 RX ADMIN — ZONISAMIDE 400 MG: 100 CAPSULE ORAL at 08:41

## 2018-12-13 RX ADMIN — LEVOTHYROXINE SODIUM 75 MCG: 75 TABLET ORAL at 08:39

## 2018-12-13 ASSESSMENT — PAIN SCALES - WONG BAKER
WONGBAKER_NUMERICALRESPONSE: 0

## 2018-12-13 NOTE — DISCHARGE SUMMARY
Hospital Medicine Discharge Summary    Patient ID: Mike Lujan   Gender: male  : 1975   Age: 41 y.o. MRN: [id number]  Code Status: Full Code   Patient's PCP: Rendall Cooks, MD    Admit Date: 12/10/2018     Discharge Date: 2018    Admitting Physician: Jaya Chakraborty MD     Discharge Physician: Mitchell Fabian MD     Discharge Diagnoses: Active Hospital Problems    Diagnosis Date Noted    Goals of care, counseling/discussion [Z71.89]     DNR (do not resuscitate) discussion [Z71.89]     Encounter for palliative care [Z51.5]     Status epilepticus ([facility] [address] Aubrie Child 12/10/2018       The patient was seen and examined on day of discharge and this discharge summary is in conjunction with any daily progress note from day of discharge. Hospital Course:     HPI  Patient is a 41year old male that presents from a SNF for concerns of seizure like activity. He has a past medical history of severe mental retardation, history of Blindness (right prosthetic eye), Diabetes mellitus, Epilepsy; Extreme hearing loss. He was brought in from his SNF because he needs help with almost all his ADL's. Staff at the facility said that they witnessed a two minute seizure which involved rhythmic muscle contractions. There was two minutes between the first and second seizure. There was no return to baseline. One dose of diazepam was given and there was no resplution of symptoms. He is seen by neurology for his seizure disorder. They are classically described as Generalized body stiffening and jerking for 2 minutes. +UI +Drooling -UI +Fatigue. He is on a number of antiepileptics with no resolution. Current AEDs: Carbatrol 400 mg BID, Gabapentin 300 mg QID, Lamictal BRAND 400/450, Zonisamide 400 mg qhs, PRN Diastat. EEG: Generalized slow activity with independent spikes in left frontal-temporal and right temporal regions. He has quite an extensive history of emergency room visits for seizure like activity.  The

## 2018-12-13 NOTE — PROGRESS NOTES
Pt transferred to room 6310 for ICU. Quiet and cooperative. RA resp easy. Vital signs stable, afebrile. No seizure activity noted. Call light in reach, bed alarm and [employer] camera in place.

## 2018-12-13 NOTE — CARE COORDINATION
Called Dwayne Bryant (guardian) to make her aware of discharge today. She is in [place] today and asked that we follow up with the group home and she will follow up  When she returns.   Electronically signed by Manasa Sutton RN on 12/13/2018 at 11:37 AM
Nurse, Johny Chambers, stated that nurse for this patient called and said that we would have to provide ambulance transport for this patient back home. The nurse is Uday Polo at [phone]. [employer], informed her of time of transport. Address: [address] [place], [place], [address] [place] [address]. Patient set up for 5:00 pm transport to [address] [place] [address] by [facility].  Electronically signed by Mi Bell RN on 12/13/2018 at 3:12 PM
This CM found a contact mobile number for [facility] on a previous physician visit: Mobile # [phone]. Patient lives in [facility] and that  is Keira Corona. Keira Corona states she is new and is calling the manager of the home to check to see what procedure is followed for patient discharge and will return call to this CM. CM will continue to follow patient until discharge.   Electronically signed by Lazaro Dick RN on 12/13/2018 at 12:24 PM
refill/meds to beds program?:      Goals of Care  Patient expects to be discharged to[de-identified] group home   Patient plans for SNF: To be determined     Mode of transport from hospital: Patient likely to need transportation arranged by Case Management     Barriers to discharge: None at this time     Mike Alcocer RN  [facility]  Case Management Department  Ph: [phone]  Fax: [phone]

## 2018-12-13 NOTE — PROCEDURES
non-specific etiology  2. Left anterior sharp waves conferring an increased risk of focal onset seizures from this region.     3. No seizures

## 2018-12-13 NOTE — DISCHARGE INSTR - COC
pneumonia) J18.9    Infection of thumb L08.9    Hypernatremia E87.0    Acute hypernatremia E87.0    Acute encephalopathy G93.40    Hypokalemia E87.6    Seizure disorder (HCC) G40.909    Hyperglycemia R73.9    Abnormal finding on GI tract imaging R93.3    Status epilepticus ([facility] [address] G40.901    Goals of care, counseling/discussion Z71.89    DNR (do not resuscitate) discussion Z70.80    Encounter for palliative care Z51.5       Isolation/Infection:   Isolation          No Isolation        Patient Infection Status     Infection Encounter Level? Added Added By Resolved Resolved By Review Date Onset Date    MRSA No 01/18/16 Bertha Carlos RN 03/06/18 Ros Peña RN            Nurse Assessment:  Last Vital Signs: /64   Pulse 64   Temp 97.2 °F (36.2 °C) (Axillary)   Resp 14   Ht 5' 6.93\" (1.7 m)   Wt 104 lb 8 oz (47.4 kg)   SpO2 100%   BMI 16.40 kg/m²     Last documented pain score (0-10 scale):    Last Weight:   Wt Readings from Last 1 Encounters:   12/13/18 104 lb 8 oz (47.4 kg)     Mental Status:  unable to assess, pt non verbal    IV Access:  - None    Nursing Mobility/ADLs:  Walking   Dependent  Transfer  Dependent  Bathing  Dependent  Dressing  Dependent  Toileting  Dependent  Feeding  Dependent  Med Admin  Dependent  Med Delivery   whole or crushed with applesauce    Wound Care Documentation and Therapy:        Elimination:  Continence:   · Bowel:NO  · Bladder: NO  Urinary Catheter: None   Colostomy/Ileostomy/Ileal Conduit: Yes       Date of Last BM: 12/12/2018    Intake/Output Summary (Last 24 hours) at 12/13/18 1602  Last data filed at 12/13/18 0749   Gross per 24 hour   Intake              415 ml   Output                0 ml   Net              415 ml     I/O last 3 completed shifts:   In: 12 [P.O.:360; I.V.:55]  Out: -     Safety Concerns:     History of Seizures    Impairments/Disabilities:      Speech, Language Barrier - non verbal, Vision and Hearing    Nutrition Therapy:  Current Nutrition Therapy:   - Oral Diet:  Dysphagia 1 pureed    Routes of Feeding: Oral  Liquids: Nectar Thick Liquids  Daily Fluid Restriction: no  Last Modified Barium Swallow with Video (Video Swallowing Test): not done    Treatments at the Time of Hospital Discharge:   Respiratory Treatments: none  Oxygen Therapy:  is not on home oxygen therapy. Ventilator:    - No ventilator support    Rehab Therapies:   Weight Bearing Status/Restrictions: No weight bearing restirctions  Other Medical Equipment (for information only, NOT a DME order):  hospital bed  Other Treatments:     Patient's personal belongings (please select all that are sent with patient):  {P DME Belongings:823126033}    RN SIGNATURE:  {Esignature:126225908}    CASE MANAGEMENT/SOCIAL WORK SECTION    Inpatient Status Date: ***    Readmission Risk Assessment Score:  Readmission Risk              Risk of Unplanned Readmission:        23           Discharging to Facility/ Agency   · Name:   · Address:  · Phone:  · Fax:    Dialysis Facility (if applicable)   · Name:  · Address:  · Dialysis Schedule:  · Phone:  · Fax:    / signature: {Esignature:021088486}    PHYSICIAN SECTION    Prognosis: {Prognosis:2453506350}    Condition at Discharge: [address] [place] [address] Patient Condition:223378815}    Rehab Potential (if transferring to Rehab): {Prognosis:6014124526}    Recommended Labs or Other Treatments After Discharge: ***    Physician Certification: I certify the above information and transfer of Timo Jordan  is necessary for the continuing treatment of the diagnosis listed and that he requires {Admit to Appropriate Level of Care:31169} for {GREATER/LESS:216095205} 30 days.      Update Admission H&P: [facility]}    PHYSICIAN SIGNATURE:  {Esignature:400291538}

## 2018-12-15 LAB
BLOOD CULTURE, ROUTINE: NORMAL
CULTURE, BLOOD 2: NORMAL

## 2019-02-20 ENCOUNTER — HOSPITAL ENCOUNTER (OUTPATIENT)
Dept: GENERAL RADIOLOGY | Age: 44
Discharge: HOME | End: 2019-02-20
Payer: MEDICAID

## 2019-02-20 DIAGNOSIS — R13.10 DYSPHAGIA, UNSPECIFIED TYPE: ICD-10-CM

## 2019-02-20 PROCEDURE — 92526 ORAL FUNCTION THERAPY: CPT

## 2019-02-20 PROCEDURE — 92611 MOTION FLUOROSCOPY/SWALLOW: CPT

## 2019-02-20 PROCEDURE — 74230 X-RAY XM SWLNG FUNCJ C+: CPT

## 2019-04-28 ENCOUNTER — HOSPITAL ENCOUNTER (EMERGENCY)
Age: 44
Discharge: HOME | End: 2019-04-28
Attending: EMERGENCY MEDICINE
Payer: MEDICAID

## 2019-04-28 VITALS
HEART RATE: 86 BPM | DIASTOLIC BLOOD PRESSURE: 86 MMHG | OXYGEN SATURATION: 99 % | RESPIRATION RATE: 14 BRPM | SYSTOLIC BLOOD PRESSURE: 121 MMHG | TEMPERATURE: 97.8 F

## 2019-04-28 DIAGNOSIS — G40.909 SEIZURE DISORDER (HCC): ICD-10-CM

## 2019-04-28 DIAGNOSIS — R56.9 SEIZURE (HCC): Primary | ICD-10-CM

## 2019-04-28 LAB
A/G RATIO: 1.4 (ref 1.1–2.2)
ALBUMIN SERPL-MCNC: 4.3 G/DL (ref 3.4–5)
ALP BLD-CCNC: 205 U/L (ref 40–129)
ALT SERPL-CCNC: 13 U/L (ref 10–40)
ANION GAP SERPL CALCULATED.3IONS-SCNC: 15 MMOL/L (ref 3–16)
AST SERPL-CCNC: 14 U/L (ref 15–37)
BILIRUB SERPL-MCNC: <0.2 MG/DL (ref 0–1)
BUN BLDV-MCNC: 13 MG/DL (ref 7–20)
CALCIUM SERPL-MCNC: 9.2 MG/DL (ref 8.3–10.6)
CARBAMAZEPINE DOSE: NORMAL
CARBAMAZEPINE LEVEL: 7.2 UG/ML (ref 4–12)
CHLORIDE BLD-SCNC: 105 MMOL/L (ref 99–110)
CO2: 24 MMOL/L (ref 21–32)
CREAT SERPL-MCNC: 1.1 MG/DL (ref 0.9–1.3)
GFR AFRICAN AMERICAN: >60
GFR NON-AFRICAN AMERICAN: >60
GLOBULIN: 3 G/DL
GLUCOSE BLD-MCNC: 154 MG/DL (ref 70–99)
HCT VFR BLD CALC: 44.7 % (ref 40.5–52.5)
HEMOGLOBIN: 14.7 G/DL (ref 13.5–17.5)
MCH RBC QN AUTO: 29.4 PG (ref 26–34)
MCHC RBC AUTO-ENTMCNC: 32.9 G/DL (ref 31–36)
MCV RBC AUTO: 89.2 FL (ref 80–100)
PDW BLD-RTO: 14.5 % (ref 12.4–15.4)
PLATELET # BLD: 150 K/UL (ref 135–450)
PMV BLD AUTO: 7.2 FL (ref 5–10.5)
POTASSIUM REFLEX MAGNESIUM: 3.9 MMOL/L (ref 3.5–5.1)
RBC # BLD: 5.01 M/UL (ref 4.2–5.9)
SODIUM BLD-SCNC: 144 MMOL/L (ref 136–145)
TOTAL PROTEIN: 7.3 G/DL (ref 6.4–8.2)
WBC # BLD: 5.5 K/UL (ref 4–11)

## 2019-04-28 PROCEDURE — 2580000003 HC RX 258: Performed by: EMERGENCY MEDICINE

## 2019-04-28 PROCEDURE — 80053 COMPREHEN METABOLIC PANEL: CPT

## 2019-04-28 PROCEDURE — 96360 HYDRATION IV INFUSION INIT: CPT

## 2019-04-28 PROCEDURE — 80156 ASSAY CARBAMAZEPINE TOTAL: CPT

## 2019-04-28 PROCEDURE — 80175 DRUG SCREEN QUAN LAMOTRIGINE: CPT

## 2019-04-28 PROCEDURE — 85027 COMPLETE CBC AUTOMATED: CPT

## 2019-04-28 PROCEDURE — 99284 EMERGENCY DEPT VISIT MOD MDM: CPT

## 2019-04-28 RX ORDER — 0.9 % SODIUM CHLORIDE 0.9 %
500 INTRAVENOUS SOLUTION INTRAVENOUS ONCE
Status: COMPLETED | OUTPATIENT
Start: 2019-04-28 | End: 2019-04-28

## 2019-04-28 RX ADMIN — SODIUM CHLORIDE 500 ML: 9 INJECTION, SOLUTION INTRAVENOUS at 07:54

## 2019-04-28 NOTE — ED PROVIDER NOTES
SURGICAL HISTORY Right 1-7-16    excision thumb mass     History reviewed. No pertinent family history. Social History     Socioeconomic History    Marital status: Single     Spouse name: Not on file    Number of children: Not on file    Years of education: Not on file    Highest education level: Not on file   Occupational History    Not on file   Social Needs    Financial resource strain: Not on file    Food insecurity:     Worry: Not on file     Inability: Not on file    Transportation needs:     Medical: Not on file     Non-medical: Not on file   Tobacco Use    Smoking status: Never Smoker    Smokeless tobacco: Never Used   Substance and Sexual Activity    Alcohol use: No    Drug use: No    Sexual activity: Not Currently   Lifestyle    Physical activity:     Days per week: Not on file     Minutes per session: Not on file    Stress: Not on file   Relationships    Social connections:     Talks on phone: Not on file     Gets together: Not on file     Attends Episcopal service: Not on file     Active member of club or organization: Not on file     Attends meetings of clubs or organizations: Not on file     Relationship status: Not on file    Intimate partner violence:     Fear of current or ex partner: Not on file     Emotionally abused: Not on file     Physically abused: Not on file     Forced sexual activity: Not on file   Other Topics Concern    Not on file   Social History Narrative    Not on file     No current facility-administered medications for this encounter.       Current Outpatient Medications   Medication Sig Dispense Refill    lamoTRIgine (LAMICTAL) 200 MG tablet Take 2 tablets by mouth every morning 30 tablet 3    lamoTRIgine (LAMICTAL) 150 MG tablet Take 3 tablets by mouth nightly 30 tablet 3    zonisamide (ZONEGRAN) 100 MG capsule Take 4 capsules by mouth daily 30 capsule 3    Benzoyl Peroxide 5 % LOTN Apply topically as needed (Acne)      acetaminophen (TYLENOL) 650 MG  bisacodyl (DULCOLAX) 10 MG suppository Place 10 mg rectally 2 times daily as needed        Allergies   Allergen Reactions    Polymyxin B Other (See Comments)     Unknown reaction    Polytrim [Polymyxin B-Trimethoprim]        REVIEW OF SYSTEMS  10 systems reviewed, pertinent positives per HPI otherwise noted to be negative. PHYSICAL EXAM  /86   Pulse 86   Temp 97.8 °F (36.6 °C) (Oral)   Resp 14   SpO2 99%    GENERAL APPEARANCE: Resting but arousable open her eyes occasionally. No distress  HENT: Normocephalic. Atraumatic. Mucous membranes are moist  NECK: Supple. EYES: Chronic changes to the right eye, question D nucleation. Left eye PERRLA EOMI  HEART/CHEST: RRR. No murmurs. 2+ radial pedal pulses bilaterally  LUNGS: Respirations unlabored. CTAB. Good air exchange. Speaking comfortably in full sentences. ABDOMEN: No tenderness. Soft. Non-distended. MUSCULOSKELETAL: Mild chronic appearing edema of the feet and distal legs. No grimace to palpation over the areas. Range of motion testing elicits no discomfort. NEURO: Patient moves extremities grossly, nonverbal.  Sensation intact no facial asymmetry. PSYCHIATRIC: Normal mood and affect. LABS  I have reviewed all labs for this visit.    Results for orders placed or performed during the hospital encounter of 04/28/19   Comprehensive Metabolic Panel w/ Reflex to MG   Result Value Ref Range    Sodium 144 136 - 145 mmol/L    Potassium reflex Magnesium 3.9 3.5 - 5.1 mmol/L    Chloride 105 99 - 110 mmol/L    CO2 24 21 - 32 mmol/L    Anion Gap 15 3 - 16    Glucose 154 (H) 70 - 99 mg/dL    BUN 13 7 - 20 mg/dL    CREATININE 1.1 0.9 - 1.3 mg/dL    GFR Non-African American >60 >60    GFR African American >60 >60    Calcium 9.2 8.3 - 10.6 mg/dL    Total Protein 7.3 6.4 - 8.2 g/dL    Alb 4.3 3.4 - 5.0 g/dL    Albumin/Globulin Ratio 1.4 1.1 - 2.2    Total Bilirubin <0.2 0.0 - 1.0 mg/dL    Alkaline Phosphatase 205 (H) 40 - 129 U/L    ALT 13 10 - 40 U/L    AST 14 (L) 15 - 37 U/L    Globulin 3.0 g/dL   CBC   Result Value Ref Range    WBC 5.5 4.0 - 11.0 K/uL    RBC 5.01 4.20 - 5.90 M/uL    Hemoglobin 14.7 13.5 - 17.5 g/dL    Hematocrit 44.7 40.5 - 52.5 %    MCV 89.2 80.0 - 100.0 fL    MCH 29.4 26.0 - 34.0 pg    MCHC 32.9 31.0 - 36.0 g/dL    RDW 14.5 12.4 - 15.4 %    Platelets 112 727 - 032 K/uL    MPV 7.2 5.0 - 10.5 fL   Carbamazepine level, total   Result Value Ref Range    Carbamazepine Lvl 7.2 4.0 - 12.0 ug/mL    Carbamazepine Dose Unknown        ECG      RADIOLOGY      ED COURSE/MDM  Patient seen and evaluated. Old records reviewed. Labs reviewed and discussed in front of the patient with his caretaker that's here from his group home. They did confirm there is no trauma patient was lying when this happened. He got his Diastat and he stopped he is not having any further seizure activity here he is alert and is at baseline. The staff came in is sitting with him and state he is back his baseline now. He is nonverbal at baseline. I don't feel is any indication to scan him he's had these issues before we did check basic labs to make sure his electrolytes and everything were normal which his blood work looks get his blood sugars only mildly elevated he is on insulin. His mirtazapine level is therapeutic I did check a Lamictal level which I explained to the caretaker that they would follow up on that as it would not come back today. I don't feel that he needs any further workup or admission at this time he's had seizures as just been a while she's not sure how long its been sick she's had him but he has had them multiple times in the past.  Given his current presentation is back at baseline no further seizure activity I feel he be safely discharged back to his group home. The staff agrees with the plan. They were given return precautions. He was monitored here for multiple hours.       During the patient's ED course, the patient was given:  Medications

## 2019-04-29 LAB — LAMOTRIGINE LEVEL: 14.5 UG/ML (ref 2.5–15)

## 2020-03-07 ENCOUNTER — HOSPITAL ENCOUNTER (EMERGENCY)
Age: 45
Discharge: HOME | End: 2020-03-08
Attending: EMERGENCY MEDICINE
Payer: MEDICAID

## 2020-03-07 PROCEDURE — 99284 EMERGENCY DEPT VISIT MOD MDM: CPT

## 2020-03-08 ENCOUNTER — APPOINTMENT (OUTPATIENT)
Dept: GENERAL RADIOLOGY | Age: 45
End: 2020-03-08
Payer: MEDICAID

## 2020-03-08 VITALS
RESPIRATION RATE: 18 BRPM | OXYGEN SATURATION: 100 % | WEIGHT: 123 LBS | BODY MASS INDEX: 19.3 KG/M2 | TEMPERATURE: 98 F | HEART RATE: 87 BPM | SYSTOLIC BLOOD PRESSURE: 132 MMHG | DIASTOLIC BLOOD PRESSURE: 77 MMHG

## 2020-03-08 PROCEDURE — 73502 X-RAY EXAM HIP UNI 2-3 VIEWS: CPT

## 2020-03-08 PROCEDURE — 73560 X-RAY EXAM OF KNEE 1 OR 2: CPT

## 2020-03-08 NOTE — ED PROVIDER NOTES
dictating provider for clarification.      Arabella Marquez DO  ATTENDING, [facility], DO  03/09/20 5468

## 2020-03-11 ENCOUNTER — OFFICE VISIT (OUTPATIENT)
Dept: ORTHOPEDIC SURGERY | Age: 45
End: 2020-03-11
Payer: MEDICAID

## 2020-03-11 VITALS — WEIGHT: 123.02 LBS | HEIGHT: 67 IN | BODY MASS INDEX: 19.31 KG/M2

## 2020-03-11 PROCEDURE — 99203 OFFICE O/P NEW LOW 30 MIN: CPT | Performed by: ORTHOPAEDIC SURGERY

## 2020-03-23 ENCOUNTER — APPOINTMENT (OUTPATIENT)
Dept: GENERAL RADIOLOGY | Age: 45
End: 2020-03-23
Payer: MEDICAID

## 2020-03-23 ENCOUNTER — HOSPITAL ENCOUNTER (EMERGENCY)
Age: 45
Discharge: HOME | End: 2020-03-23
Payer: MEDICAID

## 2020-03-23 VITALS
BODY MASS INDEX: 19.31 KG/M2 | RESPIRATION RATE: 16 BRPM | HEART RATE: 74 BPM | WEIGHT: 123 LBS | TEMPERATURE: 98.1 F | OXYGEN SATURATION: 100 % | DIASTOLIC BLOOD PRESSURE: 81 MMHG | SYSTOLIC BLOOD PRESSURE: 128 MMHG

## 2020-03-23 LAB
A/G RATIO: 1.1 (ref 1.1–2.2)
ALBUMIN SERPL-MCNC: 4 G/DL (ref 3.4–5)
ALP BLD-CCNC: 165 U/L (ref 40–129)
ALT SERPL-CCNC: 11 U/L (ref 10–40)
ANION GAP SERPL CALCULATED.3IONS-SCNC: 10 MMOL/L (ref 3–16)
AST SERPL-CCNC: 14 U/L (ref 15–37)
BASOPHILS ABSOLUTE: 0 K/UL (ref 0–0.2)
BASOPHILS RELATIVE PERCENT: 0.3 %
BILIRUB SERPL-MCNC: <0.2 MG/DL (ref 0–1)
BUN BLDV-MCNC: 15 MG/DL (ref 7–20)
CALCIUM SERPL-MCNC: 9.4 MG/DL (ref 8.3–10.6)
CHLORIDE BLD-SCNC: 101 MMOL/L (ref 99–110)
CO2: 29 MMOL/L (ref 21–32)
CREAT SERPL-MCNC: 0.8 MG/DL (ref 0.9–1.3)
EOSINOPHILS ABSOLUTE: 0 K/UL (ref 0–0.6)
EOSINOPHILS RELATIVE PERCENT: 0.4 %
GFR AFRICAN AMERICAN: >60
GFR NON-AFRICAN AMERICAN: >60
GLOBULIN: 3.5 G/DL
GLUCOSE BLD-MCNC: 184 MG/DL (ref 70–99)
HCT VFR BLD CALC: 44 % (ref 40.5–52.5)
HEMOGLOBIN: 14.8 G/DL (ref 13.5–17.5)
LACTIC ACID: 1.9 MMOL/L (ref 0.4–2)
LYMPHOCYTES ABSOLUTE: 1.7 K/UL (ref 1–5.1)
LYMPHOCYTES RELATIVE PERCENT: 25.7 %
MCH RBC QN AUTO: 29.7 PG (ref 26–34)
MCHC RBC AUTO-ENTMCNC: 33.6 G/DL (ref 31–36)
MCV RBC AUTO: 88.2 FL (ref 80–100)
MONOCYTES ABSOLUTE: 0.5 K/UL (ref 0–1.3)
MONOCYTES RELATIVE PERCENT: 7.6 %
NEUTROPHILS ABSOLUTE: 4.5 K/UL (ref 1.7–7.7)
NEUTROPHILS RELATIVE PERCENT: 66 %
PDW BLD-RTO: 14.4 % (ref 12.4–15.4)
PLATELET # BLD: 202 K/UL (ref 135–450)
PMV BLD AUTO: 7.6 FL (ref 5–10.5)
POTASSIUM REFLEX MAGNESIUM: 4.5 MMOL/L (ref 3.5–5.1)
RBC # BLD: 4.99 M/UL (ref 4.2–5.9)
SODIUM BLD-SCNC: 140 MMOL/L (ref 136–145)
TOTAL PROTEIN: 7.5 G/DL (ref 6.4–8.2)
WBC # BLD: 6.7 K/UL (ref 4–11)

## 2020-03-23 PROCEDURE — 87040 BLOOD CULTURE FOR BACTERIA: CPT

## 2020-03-23 PROCEDURE — 99283 EMERGENCY DEPT VISIT LOW MDM: CPT

## 2020-03-23 PROCEDURE — 85025 COMPLETE CBC W/AUTO DIFF WBC: CPT

## 2020-03-23 PROCEDURE — 80053 COMPREHEN METABOLIC PANEL: CPT

## 2020-03-23 PROCEDURE — 71045 X-RAY EXAM CHEST 1 VIEW: CPT

## 2020-03-23 PROCEDURE — 83605 ASSAY OF LACTIC ACID: CPT

## 2020-03-23 RX ORDER — MOXIFLOXACIN HYDROCHLORIDE 400 MG/1
400 TABLET ORAL DAILY
Qty: 7 TABLET | Refills: 0 | Status: SHIPPED | OUTPATIENT
Start: 2020-03-23 | End: 2020-03-30

## 2020-03-23 RX ORDER — MOXIFLOXACIN HYDROCHLORIDE 400 MG/1
400 TABLET ORAL DAILY
Qty: 10 TABLET | Refills: 0 | Status: SHIPPED | OUTPATIENT
Start: 2020-03-23 | End: 2020-03-23 | Stop reason: SDUPTHER

## 2020-03-23 NOTE — ED PROVIDER NOTES
[facility]  ED  EMERGENCY DEPARTMENT ENCOUNTER        Pt Name: Sherrill Garcia  MRN: [id number]  [place] 1975  Date of evaluation: 3/23/2020  Provider: Nickolas Hughes PA-C  PCP: Elidia Newby MD  ED Attending: Lisa Claros MD      This patient was not seen by the attending provider     History provided by caregivers    CHIEF COMPLAINT:     Chief Complaint   Patient presents with    Cough     from group home, states cough and fever, non verbal pt     Fever       HISTORY OF PRESENT ILLNESS:      Sherrill Garcia is a 45 y.o. male who arrives to the ED by EMS. The patient was sent in from [facility] which is essentially a group home facility. The patient was brought in (after I confirmed this myself with caregivers at the facility) with concerns regarding hypoxia. The patient reportedly ate well this morning and had no visible signs of respiratory distress. He has not had any fevers or coughing. However had a witnessed episode where he was hypoxic and \"was not himself\". In describing this staff basically said he was less active than usual.  The patient himself has a history of MRDD. He is legally blind and hearing impaired. He is nonverbal and is unable to provide any history. Nursing Notes were reviewed     REVIEW OF SYSTEMS:     Review of Systems   Unable to perform ROS: Patient nonverbal       Except as noted above in the ROS, all other systems were reviewed and negative.          PAST MEDICAL HISTORY:     Past Medical History:   Diagnosis Date    Acne     Allergic rhinitis     Blindness, legal     Constipation     Dehydration     Dermatitis     Diabetes mellitus ([facility] [address]     Epilepsy ([facility] [address]     GERD (gastroesophageal reflux disease)     GERD (gastroesophageal reflux disease)     Hearing loss     Hypokalemia     Hypothyroidism     Impulse control disorder     Incontinence     Insomnia     Mental retardation     Mental retardation, profound (I.Q. < 20)     Microcephaly (HCC)     MRSA (methicillin resistant staph aureus) culture positive 1/15/16    hand    Non-verbal learning disorder     PAD [facility]peripheral artery disease) ([facility] [address]     Pneumonia     Scoliosis     Thyroid disease          SURGICAL HISTORY:      Past Surgical History:   Procedure Laterality Date    CHEST TUBE INSERTION      upper lobes bilat; when born   Ariana Blankenship OTHER SURGICAL HISTORY Right 1-7-16    excision thumb mass         CURRENT MEDICATIONS:       Previous Medications    ACETAMINOPHEN (TYLENOL PO)    Take  by mouth. 650 po/pr q4hr prn pain/fever     ACETAMINOPHEN (TYLENOL) 650 MG SUPPOSITORY    Place 650 mg rectally every 4 hours as needed for Fever    ASPIRIN 81 MG CHEWABLE TABLET    Take 81 mg by mouth daily. BENZOYL PEROXIDE 5 % LOTN    Apply topically as needed (Acne)    BISACODYL (DULCOLAX) 10 MG SUPPOSITORY    Place 10 mg rectally 2 times daily as needed     CARBAMAZEPINE (CARBATROL) 200 MG CR CAPSULE    Take 2 capsules by mouth every 12 hours    CHOLECALCIFEROL 400 UNIT TABS TABLET    Take 400 Units by mouth daily    DIAZEPAM (DIASTAT ACUDIAL) 10 MG GEL    Place 10 mg rectally as needed Inject 10mg rectally as needed for seizure lasting longer than 3 minutes. DOCUSATE SODIUM (COLACE) 100 MG CAPSULE    Take 100 mg by mouth 2 times daily.       GABAPENTIN (NEURONTIN) 300 MG CAPSULE    Take 1 capsule by mouth 4 times daily    GLUCAGON 1 MG INJECTION    Infuse 1 kit intravenously once If pt is unresponsive and BS less than 50    GLUCOSE (GLUTOSE) 40 % GEL    Take 15 g by mouth See Admin Instructions if pt is symptomatic and BS less than 50    INSULIN ASPART (NOVOLOG) 100 UNIT/ML INJECTION PEN    Inject into the skin 3 times daily (before meals) Sliding scale  0700,1200,1600  Less than 200 -None  200-299  4 units  300-399  5 units  400-499  6   500 and above, call MD    INSULIN GLARGINE (LANTUS) 100 UNIT/ML INJECTION VIAL    Inject 12 Units into the skin every morning

## 2020-03-23 NOTE — ED NOTES
Bed: 16  Expected date:   Expected time:   Means of arrival:   Comments:  Mackenzie Cheney RN  03/23/20 2949

## 2020-03-27 LAB
BLOOD CULTURE, ROUTINE: NORMAL
CULTURE, BLOOD 2: NORMAL

## 2020-09-06 ENCOUNTER — HOSPITAL ENCOUNTER (EMERGENCY)
Age: 45
Discharge: HOME | End: 2020-09-06
Attending: EMERGENCY MEDICINE
Payer: MEDICAID

## 2020-09-06 VITALS
OXYGEN SATURATION: 100 % | WEIGHT: 120 LBS | RESPIRATION RATE: 18 BRPM | HEART RATE: 86 BPM | SYSTOLIC BLOOD PRESSURE: 103 MMHG | DIASTOLIC BLOOD PRESSURE: 68 MMHG | TEMPERATURE: 96.9 F | BODY MASS INDEX: 19.29 KG/M2 | HEIGHT: 66 IN

## 2020-09-06 LAB
A/G RATIO: 1.4 (ref 1.1–2.2)
ALBUMIN SERPL-MCNC: 4.4 G/DL (ref 3.4–5)
ALP BLD-CCNC: 167 U/L (ref 40–129)
ALT SERPL-CCNC: 22 U/L (ref 10–40)
ANION GAP SERPL CALCULATED.3IONS-SCNC: 12 MMOL/L (ref 3–16)
ANION GAP SERPL CALCULATED.3IONS-SCNC: 32 MMOL/L (ref 3–16)
AST SERPL-CCNC: 27 U/L (ref 15–37)
BASOPHILS ABSOLUTE: 0 K/UL (ref 0–0.2)
BASOPHILS RELATIVE PERCENT: 0.4 %
BILIRUB SERPL-MCNC: <0.2 MG/DL (ref 0–1)
BUN BLDV-MCNC: 15 MG/DL (ref 7–20)
BUN BLDV-MCNC: 16 MG/DL (ref 7–20)
CALCIUM SERPL-MCNC: 7.7 MG/DL (ref 8.3–10.6)
CALCIUM SERPL-MCNC: 9.5 MG/DL (ref 8.3–10.6)
CARBAMAZEPINE DOSE: ABNORMAL
CARBAMAZEPINE LEVEL: 3.1 UG/ML (ref 4–12)
CHLORIDE BLD-SCNC: 104 MMOL/L (ref 99–110)
CHLORIDE BLD-SCNC: 95 MMOL/L (ref 99–110)
CO2: 13 MMOL/L (ref 21–32)
CO2: 25 MMOL/L (ref 21–32)
CREAT SERPL-MCNC: 0.8 MG/DL (ref 0.9–1.3)
CREAT SERPL-MCNC: 1.1 MG/DL (ref 0.9–1.3)
EKG ATRIAL RATE: 74 BPM
EKG DIAGNOSIS: NORMAL
EKG P AXIS: 76 DEGREES
EKG P-R INTERVAL: 142 MS
EKG Q-T INTERVAL: 404 MS
EKG QRS DURATION: 70 MS
EKG QTC CALCULATION (BAZETT): 448 MS
EKG R AXIS: 90 DEGREES
EKG T AXIS: 82 DEGREES
EKG VENTRICULAR RATE: 74 BPM
EOSINOPHILS ABSOLUTE: 0 K/UL (ref 0–0.6)
EOSINOPHILS RELATIVE PERCENT: 0.3 %
GFR AFRICAN AMERICAN: >60
GFR AFRICAN AMERICAN: >60
GFR NON-AFRICAN AMERICAN: >60
GFR NON-AFRICAN AMERICAN: >60
GLOBULIN: 3.1 G/DL
GLUCOSE BLD-MCNC: 123 MG/DL (ref 70–99)
GLUCOSE BLD-MCNC: 138 MG/DL (ref 70–99)
GLUCOSE BLD-MCNC: 38 MG/DL (ref 70–99)
GLUCOSE BLD-MCNC: 38 MG/DL (ref 70–99)
GLUCOSE BLD-MCNC: 94 MG/DL (ref 70–99)
HCT VFR BLD CALC: 43.3 % (ref 40.5–52.5)
HEMOGLOBIN: 14.1 G/DL (ref 13.5–17.5)
LYMPHOCYTES ABSOLUTE: 2.9 K/UL (ref 1–5.1)
LYMPHOCYTES RELATIVE PERCENT: 34.5 %
MCH RBC QN AUTO: 30 PG (ref 26–34)
MCHC RBC AUTO-ENTMCNC: 32.7 G/DL (ref 31–36)
MCV RBC AUTO: 91.9 FL (ref 80–100)
MONOCYTES ABSOLUTE: 0.4 K/UL (ref 0–1.3)
MONOCYTES RELATIVE PERCENT: 4.9 %
NEUTROPHILS ABSOLUTE: 5.1 K/UL (ref 1.7–7.7)
NEUTROPHILS RELATIVE PERCENT: 59.9 %
PDW BLD-RTO: 14.6 % (ref 12.4–15.4)
PERFORMED ON: ABNORMAL
PLATELET # BLD: 199 K/UL (ref 135–450)
PMV BLD AUTO: 8.3 FL (ref 5–10.5)
POTASSIUM SERPL-SCNC: 3.1 MMOL/L (ref 3.5–5.1)
POTASSIUM SERPL-SCNC: 3.7 MMOL/L (ref 3.5–5.1)
RBC # BLD: 4.71 M/UL (ref 4.2–5.9)
SODIUM BLD-SCNC: 140 MMOL/L (ref 136–145)
SODIUM BLD-SCNC: 141 MMOL/L (ref 136–145)
TOTAL PROTEIN: 7.5 G/DL (ref 6.4–8.2)
WBC # BLD: 8.5 K/UL (ref 4–11)

## 2020-09-06 PROCEDURE — 80156 ASSAY CARBAMAZEPINE TOTAL: CPT

## 2020-09-06 PROCEDURE — 36415 COLL VENOUS BLD VENIPUNCTURE: CPT

## 2020-09-06 PROCEDURE — 2580000003 HC RX 258: Performed by: EMERGENCY MEDICINE

## 2020-09-06 PROCEDURE — 93005 ELECTROCARDIOGRAM TRACING: CPT | Performed by: EMERGENCY MEDICINE

## 2020-09-06 PROCEDURE — 80053 COMPREHEN METABOLIC PANEL: CPT

## 2020-09-06 PROCEDURE — 6370000000 HC RX 637 (ALT 250 FOR IP): Performed by: EMERGENCY MEDICINE

## 2020-09-06 PROCEDURE — 80175 DRUG SCREEN QUAN LAMOTRIGINE: CPT

## 2020-09-06 PROCEDURE — 96374 THER/PROPH/DIAG INJ IV PUSH: CPT

## 2020-09-06 PROCEDURE — 93010 ELECTROCARDIOGRAM REPORT: CPT | Performed by: INTERNAL MEDICINE

## 2020-09-06 PROCEDURE — 85025 COMPLETE CBC W/AUTO DIFF WBC: CPT

## 2020-09-06 PROCEDURE — 99284 EMERGENCY DEPT VISIT MOD MDM: CPT

## 2020-09-06 RX ORDER — DEXTROSE MONOHYDRATE 25 G/50ML
25 INJECTION, SOLUTION INTRAVENOUS ONCE
Status: COMPLETED | OUTPATIENT
Start: 2020-09-06 | End: 2020-09-06

## 2020-09-06 RX ORDER — 0.9 % SODIUM CHLORIDE 0.9 %
2000 INTRAVENOUS SOLUTION INTRAVENOUS ONCE
Status: COMPLETED | OUTPATIENT
Start: 2020-09-06 | End: 2020-09-06

## 2020-09-06 RX ADMIN — DEXTROSE MONOHYDRATE 25 G: 25 INJECTION, SOLUTION INTRAVENOUS at 07:58

## 2020-09-06 RX ADMIN — POTASSIUM BICARBONATE 60 MEQ: 782 TABLET, EFFERVESCENT ORAL at 10:06

## 2020-09-06 RX ADMIN — SODIUM CHLORIDE 2000 ML: 9 INJECTION, SOLUTION INTRAVENOUS at 05:17

## 2020-09-06 NOTE — ED NOTES
Per Leonarda Watson MD who spoke with pt's legal guardian, give 1L-1.5L of sodium chloride bolus, then recheck bmp, if bmp comes back alright he is good for discharge back to [facility]. Writer will continue to monitor.      Lonnie Adamson RN  09/06/20 4512

## 2020-09-06 NOTE — ED NOTES
Spoke to caretaker, Helen Mancia, updated her on pt status and retained her phone number for a follow up call  Nathan Ortega RN  09/06/20 Carlo Whittnigton RN  09/06/20 2024

## 2020-09-06 NOTE — ED NOTES

## 2020-09-06 NOTE — ED PROVIDER NOTES
normal vitals. Repeat lab work was obtained and showed improvement with the CO2 level but concern for hypoglycemia. This did improve with D50 administration. I also ordered potassium for the patient due to concern for mild hypokalemia. I did speak to the mother who was stating that these seizures normally happen a couple of times per year but when they do happen they are significant. This is typical for him since he was a baby and at this time she did not feel that any additional work-up was necessary and was declining any more extensive work-up, and I feel this is reasonable based on his history. I reevaluated him after the glucose improved and he was starting to respond to voice and was moving all extremities and otherwise was in no acute distress. At this time, it does seem that he is getting back to his baseline but is still slightly postictal.  His mother at this point does not feel that he would require admission and would rather him go back to the group home and I feel this is reasonable. She is his healthcare power of . The mother was told to have the patient return to the ED if he does not completely return to baseline or if there are any other concerns but otherwise follow-up with PCP or neurology over the next couple of days to ensure that his antiepileptic levels are appropriate but also to repeat the potassium to ensure it has improved. The patient was in no acute distress at time of transfer back to his group home by EMS. While in the ED with me, he did not have any repeat seizures. He does not normally walk per mother and therefore I did not evaluate for this.      Joslyn Tipton MD  09/06/20 8853

## 2020-09-06 NOTE — ED NOTES
Bed: 17  Expected date:   Expected time:   Means of arrival:   Comments:  [facility] [place]  09/06/20 7556

## 2020-09-06 NOTE — ED NOTES
Writer spoke Medic  to obtain the number for the group home the patient is from, they did not have it. Writer spoke with Mis Villareal pt's legal guardian/ mother and received the number for the group home: [phone] and the number for the manager of the group home: [phone].      Hanna Ayala RN  09/06/20 8802

## 2020-09-06 NOTE — ED NOTES
Writer spoke with Denys from [facility] at this time ([phone]) Denys reports that the pt began seizing during their hourly checks and per their instructions if the pt's  seizure is >3 minutes they give rectal distat; pt continued to seize for >10 minutes so Denys called EMS. Pt is non-verbal, deaf, and blind; pt communicates with touch but it is a challenge to communicate with pt due to pt being non-verbal, deaf, and blind.      Joyce Kauffman RN  09/06/20 8830

## 2020-09-06 NOTE — ED PROVIDER NOTES
[facility]  ED  eMERGENCY dEPARTMENT eNCOUnter      Pt Name: Teddie Epley  MRN: [id number]  [place] 1975  Date of evaluation: 9/6/2020  Provider: Claude Whitehead MD    CHIEF COMPLAINT       Chief Complaint   Patient presents with    Seizures     Pt is from a group home and had a seizure lasting over 10 minutes per squad and caregiver. Pt has hx of seizures. Pt is deaf and blind, non arousable during triage but will move arms slightly in response to pain. HISTORY OF PRESENT ILLNESS   (Location/Symptom, Timing/Onset, Context/Setting, Quality, Duration, Modifying Factors, Severity)  Note limiting factors. Teddie Epley is a 39 y.o. male with complex past medical history including legal blindness, deafness, and nonverbal nature as well as regular seizures who presents after having approximately 10-minute generalized seizure at his nursing facility. History is limited from the patient due to his nonverbal nature. History is obtained from EMS as well as the patient's mother Adora Mcardle over the phone who is his healthcare power of . HPI    Nursing Notes were reviewed. REVIEW OFSYSTEMS    (2-9 systems for level 4, 10 or more for level 5)     Review of Systems   Unable to perform ROS: Patient nonverbal   Endocrine: Negative for polydipsia. Except as noted above the remainder of the review of systems was reviewed and negative.        PAST MEDICAL HISTORY     Past Medical History:   Diagnosis Date    Acne     Allergic rhinitis     Blindness, legal     Constipation     Dehydration     Dermatitis     Diabetes mellitus ([address]     Epilepsy ([address]     GERD (gastroesophageal reflux disease)     GERD (gastroesophageal reflux disease)     Hearing loss     Hypokalemia     Hypothyroidism     Impulse control disorder     Incontinence     Insomnia     Mental retardation     Mental retardation, profound (I.Q. < 20)     Microcephaly (HCC)     MRSA (methicillin resistant staph aureus) culture positive 1/15/16    hand    Non-verbal learning disorder     PAD (peripheral artery disease) [address]     Pneumonia     Scoliosis     Thyroid disease          SURGICAL HISTORY       Past Surgical History:   Procedure Laterality Date    CHEST TUBE INSERTION      upper lobes bilat; when born   [facility] OTHER SURGICAL HISTORY Right 1-7-16    excision thumb mass         CURRENT MEDICATIONS       Previous Medications    ACETAMINOPHEN (TYLENOL PO)    Take  by mouth. 650 po/pr q4hr prn pain/fever     ACETAMINOPHEN (TYLENOL) 650 MG SUPPOSITORY    Place 650 mg rectally every 4 hours as needed for Fever    ASPIRIN 81 MG CHEWABLE TABLET    Take 81 mg by mouth daily. BENZOYL PEROXIDE 5 % LOTN    Apply topically as needed (Acne)    BISACODYL (DULCOLAX) 10 MG SUPPOSITORY    Place 10 mg rectally 2 times daily as needed     CARBAMAZEPINE (CARBATROL) 200 MG CR CAPSULE    Take 2 capsules by mouth every 12 hours    CHOLECALCIFEROL 400 UNIT TABS TABLET    Take 400 Units by mouth daily    DIAZEPAM (DIASTAT ACUDIAL) 10 MG GEL    Place 10 mg rectally as needed Inject 10mg rectally as needed for seizure lasting longer than 3 minutes. DOCUSATE SODIUM (COLACE) 100 MG CAPSULE    Take 100 mg by mouth 2 times daily.       GABAPENTIN (NEURONTIN) 300 MG CAPSULE    Take 1 capsule by mouth 4 times daily    GLUCAGON 1 MG INJECTION    Infuse 1 kit intravenously once If pt is unresponsive and BS less than 50    GLUCOSE (GLUTOSE) 40 % GEL    Take 15 g by mouth See Admin Instructions if pt is symptomatic and BS less than 50    INSULIN ASPART (NOVOLOG) 100 UNIT/ML INJECTION PEN    Inject into the skin 3 times daily (before meals) Sliding scale  0700,1200,1600  Less than 200 -None  200-299  4 units  300-399  5 units  400-499  6   500 and above, call MD    INSULIN GLARGINE (LANTUS) 100 UNIT/ML INJECTION VIAL    Inject 12 Units into the skin every morning     KETOCONAZOLE (NIZORAL) 2 % SHAMPOO    Apply topically once a week LAMOTRIGINE (LAMICTAL) 150 MG TABLET    Take 3 tablets by mouth nightly    LAMOTRIGINE (LAMICTAL) 200 MG TABLET    Take 2 tablets by mouth every morning    LEVOTHYROXINE (SYNTHROID) 75 MCG TABLET    Take 75 mcg by mouth Daily    MELATONIN 3 MG TABS    Take 3 mg by mouth nightly. OMEPRAZOLE (PRILOSEC) 20 MG CAPSULE    Take 20 mg by mouth daily. POLYETHYLENE GLYCOL (GLYCOLAX) POWDER    Take 17 g by mouth every other day. SERTRALINE (ZOLOFT) 50 MG TABLET    Take 50 mg by mouth nightly. SIMETHICONE (MYLICON) 80 MG CHEWABLE TABLET    Take 80 mg by mouth three times daily. SODIUM PHOSPHATES (FLEET ENEMA) 7-19 GM/118ML ENEM    Place 118 mLs rectally as needed. Insert 1 rectally as needed for constipation if no BM in 4 Days (x1)     ZONISAMIDE (ZONEGRAN) 100 MG CAPSULE    Take 4 capsules by mouth daily       ALLERGIES     Polymyxin b and Polytrim [polymyxin b-trimethoprim]    FAMILY HISTORY     No family history on file.        SOCIAL HISTORY       Social History     Socioeconomic History    Marital status: Single     Spouse name: Not on file    Number of children: Not on file    Years of education: Not on file    Highest education level: Not on file   Occupational History    Not on file   Social Needs    Financial resource strain: Not on file    Food insecurity     Worry: Not on file     Inability: Not on file    Transportation needs     Medical: Not on file     Non-medical: Not on file   Tobacco Use    Smoking status: Never Smoker    Smokeless tobacco: Never Used   Substance and Sexual Activity    Alcohol use: No    Drug use: No    Sexual activity: Not Currently   Lifestyle    Physical activity     Days per week: Not on file     Minutes per session: Not on file    Stress: Not on file   Relationships    Social connections     Talks on phone: Not on file     Gets together: Not on file     Attends Mosque service: Not on file     Active member of club or organization: Not on file if it is avoidable. The patient was ordered 2 L of IV fluid and a repeat BMP is ordered. Patient is signed out to Dr. Stevo Hammond for follow-up of the BMP and either discharge back to a skilled nursing facility if it is definitely improved or further work-up if his bicarbonate anion gap remain abnormal.       Procedures    FINAL IMPRESSION      1. Seizure [facility])          DISPOSITION/PLAN   DISPOSITION  Pending repeat labs      (Please note that portions of this note were completed with a voice recognition program.  Efforts were made to edit the dictations but occasionally words aremis-transcribed. )    Alvina Craft MD (electronically signed)  Attending Emergency Physician           Alvina Craft MD  09/06/20 [id number]

## 2020-09-06 NOTE — ED NOTES
-Pt incontinent of urine, linens changed, diaper placed, pt washed off, clean gown and pants placed    -[facility] here to take pt home    -Spoke to caretaker again and informed her that pt was on his way back home     Lulú Jolly RN  09/06/20 1966

## 2020-09-06 NOTE — ED NOTES
Pt pulled iv out of RAC, repositioned pt in bed, pillow and blanket given     James Schwartz RN  09/06/20 1615

## 2020-09-06 NOTE — ED NOTES

## 2020-09-07 LAB — LAMOTRIGINE LEVEL: 7.9 UG/ML (ref 2.5–15)

## 2020-09-11 ENCOUNTER — HOSPITAL ENCOUNTER (EMERGENCY)
Age: 45
Discharge: HOME | End: 2020-09-11
Payer: MEDICAID

## 2020-09-11 VITALS
HEART RATE: 84 BPM | TEMPERATURE: 97.5 F | SYSTOLIC BLOOD PRESSURE: 127 MMHG | OXYGEN SATURATION: 100 % | RESPIRATION RATE: 16 BRPM | DIASTOLIC BLOOD PRESSURE: 82 MMHG

## 2020-09-11 PROCEDURE — 99283 EMERGENCY DEPT VISIT LOW MDM: CPT

## 2020-09-11 PROCEDURE — 12011 RPR F/E/E/N/L/M 2.5 CM/<: CPT

## 2020-09-11 NOTE — ED NOTES
Pt identified as fall risk, but has profound MR. Pt does not tolerate shoe removal, bracelet, etc.  Patient staying in [facility], seatbelt in place. Caregiver remains with patient.       Ros Dong RN  09/11/20 1766

## 2020-09-11 NOTE — ED PROVIDER NOTES
by mouth every morning 30 tablet 3    lamoTRIgine (LAMICTAL) 150 MG tablet Take 3 tablets by mouth nightly 30 tablet 3    zonisamide (ZONEGRAN) 100 MG capsule Take 4 capsules by mouth daily 30 capsule 3    Benzoyl Peroxide 5 % LOTN Apply topically as needed (Acne)      acetaminophen (TYLENOL) 650 MG suppository Place 650 mg rectally every 4 hours as needed for Fever      Cholecalciferol 400 UNIT TABS tablet Take 400 Units by mouth daily      carBAMazepine (CARBATROL) 200 MG CR capsule Take 2 capsules by mouth every 12 hours 10 capsule 0    diazepam (DIASTAT ACUDIAL) 10 MG GEL Place 10 mg rectally as needed Inject 10mg rectally as needed for seizure lasting longer than 3 minutes. 1 each 0    gabapentin (NEURONTIN) 300 MG capsule Take 1 capsule by mouth 4 times daily 10 capsule 0    ketoconazole (NIZORAL) 2 % shampoo Apply topically once a week       insulin glargine (LANTUS) 100 UNIT/ML injection vial Inject 12 Units into the skin every morning       levothyroxine (SYNTHROID) 75 MCG tablet Take 75 mcg by mouth Daily      insulin aspart (NOVOLOG) 100 UNIT/ML injection pen Inject into the skin 3 times daily (before meals) Sliding scale  0700,1200,1600  Less than 200 -None  200-299  4 units  300-399  5 units  400-499  6   500 and above, call MD      glucagon 1 MG injection Infuse 1 kit intravenously once If pt is unresponsive and BS less than 50      glucose (GLUTOSE) 40 % GEL Take 15 g by mouth See Admin Instructions if pt is symptomatic and BS less than 50      omeprazole (PRILOSEC) 20 MG capsule Take 20 mg by mouth daily.  sertraline (ZOLOFT) 50 MG tablet Take 50 mg by mouth nightly.  simethicone (MYLICON) 80 MG chewable tablet Take 80 mg by mouth three times daily.  Sodium Phosphates (FLEET ENEMA) 7-19 GM/118ML ENEM Place 118 mLs rectally as needed. Insert 1 rectally as needed for constipation if no BM in 4 Days (x1)       aspirin 81 MG chewable tablet Take 81 mg by mouth daily.  docusate sodium (COLACE) 100 MG capsule Take 100 mg by mouth 2 times daily.  Melatonin 3 MG TABS Take 3 mg by mouth nightly.  polyethylene glycol (GLYCOLAX) powder Take 17 g by mouth every other day.  Acetaminophen (TYLENOL PO) Take  by mouth. 650 po/pr q4hr prn pain/fever       bisacodyl (DULCOLAX) 10 MG suppository Place 10 mg rectally 2 times daily as needed          ALLERGIES    Allergies   Allergen Reactions    Polymyxin B Other (See Comments)     Unknown reaction    Polytrim [Polymyxin B-Trimethoprim]        FAMILY HISTORY    No family history on file.     SOCIAL HISTORY    Social History     Socioeconomic History    Marital status: Single     Spouse name: Not on file    Number of children: Not on file    Years of education: Not on file    Highest education level: Not on file   Occupational History    Not on file   Social Needs    Financial resource strain: Not on file    Food insecurity     Worry: Not on file     Inability: Not on file    Transportation needs     Medical: Not on file     Non-medical: Not on file   Tobacco Use    Smoking status: Never Smoker    Smokeless tobacco: Never Used   Substance and Sexual Activity    Alcohol use: No    Drug use: No    Sexual activity: Not Currently   Lifestyle    Physical activity     Days per week: Not on file     Minutes per session: Not on file    Stress: Not on file   Relationships    Social connections     Talks on phone: Not on file     Gets together: Not on file     Attends Orthodoxy service: Not on file     Active member of club or organization: Not on file     Attends meetings of clubs or organizations: Not on file     Relationship status: Not on file    Intimate partner violence     Fear of current or ex partner: Not on file     Emotionally abused: Not on file     Physically abused: Not on file     Forced sexual activity: Not on file   Other Topics Concern    Not on file   Social History Narrative    Not on file REVIEW OF SYSTEMS    Unable to perform with patient due to cognitive state. PHYSICAL EXAM  Vitals:    09/11/20 1040   BP: 127/82   Pulse: 84   Resp: 16   Temp: 97.5 °F (36.4 °C)   SpO2: 100%       GENERAL: Patient is well appearing, thin,  no acute distress. No apparent discomfort. Non toxic appearing. HEENT:  Normocephalic. Small area of swelling to the left eyebrow with laceration. There are no bony depressions, instability, step-off, or crepitus to palpation of the facial bones. There is no raccoon's eyes or battles sign observed. Right eye is pale in color and he is blind. Left pupil responds briskly to light. Bilateral tympanic membranes are intact and without evidence of hemotympanum or rupture. NECK: Supple with normal ROM. Trachea midline. There is no tenderness to palpation of the cervical midline spine. LUNGS:  Normal work of breathing. Breath sounds clear throughout all lung fields. CADIOVASCULAR:  Regular rate and rhythm. GI: Nondistended. EXTREMITIES: Normal ROM, no edema, no tenderness, or deformity. No gross deformities or trauma noted. Moving all extremities equally and appropriately. SKIN: Warm/dry. Laceration with large amount of surrounding swelling to the left eyebrow, no bruising, no erythema, induration, fluctuance. No rashes/lesions noted. NEUROLOGIC: Patient is at his baseline neurologically. He has severe MR and is nonverbal.  He is responding appropriately to pain. Does calm easily with caregiver prompting. Procedure  The procedure, hazards, and the alternatives were discussed. Patient had full decisional capacity, voiced understanding and gave verbal consent to proceed. 2% plain lidocaine mixed with 0.5% plain marcaine was used to obtain local anesthesia at the site of the wound. The wound was cleansed and irrigated and then prepped with antiseptic and draped in sterile fashion. The wound was explored to determine if there was any foreign body or debris. Then the wound was explored further to rule out tear to the galea or any bone involvement. Wound edges were closed with 4.0 ethilon in simple interrupted fashion. 4 total sutures were placed. Repaired length of the wound is 2 cm. There were no complications during the procedure and overall patient tolerated it wellHemostasis was obtained and topical antibacterial ointment was applied. LABS  No results found for this visit on 09/11/20. RADIOLOGY    No results found. ED COURSE/MDM  Patient seen and evaluated. Old records reviewed. I have evaluated this patient. My supervising physician was available for consultation. Dorothy Johnson presented to the ED today with above noted complaints. Physical exam reveals laceration with surrounding swelling to the left eyebrow. The patient is neurologically intact: GCS is 15, CN II-XII intact, no focal or lateralizing neurologic deficits on exam. The patient is without signs of basilar skull fracture. Patient had no loss of consciousness, he has no vomiting, he is not on any anticoagulants and per his caregiver he is behaving appropriately. I do not feel CT imaging is needed at this time. Laceration repaired per above procedure note. Caregiver was instructed on concerning signs and symptoms and when to return to a health care provider. She verbalized her understanding with these instructions. At this point I do not feel the patient requires further work up and it is reasonable to discharge the patient. A discussion was had with the patient regarding diagnosis, treatment/ plan of care, and follow up. All questions were answered. Patient will follow up as directed for further evaluation/treatment. The patient was given strict return precautions as we discussed symptoms that would necessitate return to the ED. Patient will return to ED for new/worsening symptoms. The patient verbalized their understanding and agreement with the above plan.     Please refer

## 2020-09-11 NOTE — ED NOTES
Cleaned pt's wound on top of left eyebrow with 200mL of normal saline mixed with antiseptic soap and 2 packs of 4x4s. Pt tolerated well. NP notified of completion.       Meera Beebe  09/11/20 1155

## 2020-09-11 NOTE — ED NOTES
Verbal and written discharge instructions given. Patient accompanied to car via wheelchair. Patient in stable condition, discharged home with caregiver.      Laura Alicia RN  09/11/20 [id number]

## 2020-11-28 ENCOUNTER — HOSPITAL ENCOUNTER (EMERGENCY)
Age: 45
Discharge: HOME | End: 2020-11-28
Payer: MEDICAID

## 2020-11-28 VITALS
DIASTOLIC BLOOD PRESSURE: 88 MMHG | RESPIRATION RATE: 18 BRPM | HEART RATE: 74 BPM | WEIGHT: 120 LBS | OXYGEN SATURATION: 100 % | BODY MASS INDEX: 19.37 KG/M2 | TEMPERATURE: 98.2 F | SYSTOLIC BLOOD PRESSURE: 140 MMHG

## 2020-11-28 PROCEDURE — 12001 RPR S/N/AX/GEN/TRNK 2.5CM/<: CPT

## 2020-11-28 PROCEDURE — 99282 EMERGENCY DEPT VISIT SF MDM: CPT

## 2020-11-28 NOTE — ED PROVIDER NOTES
Evaluated by Advanced Practice Provider    [address] [facility]  ED       CHIEF COMPLAINT    Laceration and Fall (fell this am, hit head, laceration to left forehead)    HISTORY OF PRESENT ILLNESS  Panchito Jasmine is a 39 y.o. male who presents to the ED complaining of fall/head injury. Patient is from a group home and here with his caregiver. This injury occurred: just prior to arrival in the ER. She was called around 8 am that he had fallen and she was called for transport. She does not have any information as to why he fell. He was in his bed, at 6 am he was fine. Went back in he had the big knot and blood on his sheets. There is a window ledge close to his bed and thinks that he caught himself when he laid back down. There is no report of him acting differently, no evidence of vomiting. He is severe MR and non-verbal. He does not do well with scans and would require sedation. He would not do well with glue as he picks at it. It will take 2-3 people to hold him for stitches and he will be better up in the chair. He is under 24 hour care. Tetanus vaccine is up to date, 2017. The patient denies other complaints, modifying factors or associated symptoms. The patient arrived to the ED via private car.     PAST MEDICAL HISTORY    Past Medical History:   Diagnosis Date    Acne     Allergic rhinitis     Blindness, legal     Constipation     Dehydration     Dermatitis     Diabetes mellitus ([address]     Epilepsy ([address]     GERD (gastroesophageal reflux disease)     GERD (gastroesophageal reflux disease)     Hearing loss     Hypokalemia     Hypothyroidism     Impulse control disorder     Incontinence     Insomnia     Mental retardation     Mental retardation, profound (I.Q. < 20)     Microcephaly (HCC)     MRSA (methicillin resistant staph aureus) culture positive 1/15/16    hand    Non-verbal learning disorder     PAD (peripheral artery disease) ([address]     Pneumonia     Scoliosis     Thyroid disease        SURGICAL HISTORY    Past Surgical History:   Procedure Laterality Date    CHEST TUBE INSERTION      upper lobes bilat; when born   Pradip Orozco OTHER SURGICAL HISTORY Right 1-7-16    excision thumb mass       CURRENT MEDICATIONS    Current Outpatient Rx   Medication Sig Dispense Refill    lamoTRIgine (LAMICTAL) 200 MG tablet Take 2 tablets by mouth every morning 30 tablet 3    lamoTRIgine (LAMICTAL) 150 MG tablet Take 3 tablets by mouth nightly 30 tablet 3    zonisamide (ZONEGRAN) 100 MG capsule Take 4 capsules by mouth daily 30 capsule 3    Benzoyl Peroxide 5 % LOTN Apply topically as needed (Acne)      acetaminophen (TYLENOL) 650 MG suppository Place 650 mg rectally every 4 hours as needed for Fever      Cholecalciferol 400 UNIT TABS tablet Take 400 Units by mouth daily      carBAMazepine (CARBATROL) 200 MG CR capsule Take 2 capsules by mouth every 12 hours 10 capsule 0    diazepam (DIASTAT ACUDIAL) 10 MG GEL Place 10 mg rectally as needed Inject 10mg rectally as needed for seizure lasting longer than 3 minutes. 1 each 0    gabapentin (NEURONTIN) 300 MG capsule Take 1 capsule by mouth 4 times daily 10 capsule 0    ketoconazole (NIZORAL) 2 % shampoo Apply topically once a week       insulin glargine (LANTUS) 100 UNIT/ML injection vial Inject 12 Units into the skin every morning       levothyroxine (SYNTHROID) 75 MCG tablet Take 75 mcg by mouth Daily      insulin aspart (NOVOLOG) 100 UNIT/ML injection pen Inject into the skin 3 times daily (before meals) Sliding scale  0700,1200,1600  Less than 200 -None  200-299  4 units  300-399  5 units  400-499  6   500 and above, call MD      glucagon 1 MG injection Infuse 1 kit intravenously once If pt is unresponsive and BS less than 50      glucose (GLUTOSE) 40 % GEL Take 15 g by mouth See Admin Instructions if pt is symptomatic and BS less than 50      omeprazole (PRILOSEC) 20 MG capsule Take 20 mg by mouth daily.       sertraline (ZOLOFT) 50 MG tablet Take 50 mg by mouth nightly.  simethicone (MYLICON) 80 MG chewable tablet Take 80 mg by mouth three times daily.  Sodium Phosphates (FLEET ENEMA) 7-19 GM/118ML ENEM Place 118 mLs rectally as needed. Insert 1 rectally as needed for constipation if no BM in 4 Days (x1)       aspirin 81 MG chewable tablet Take 81 mg by mouth daily.  docusate sodium (COLACE) 100 MG capsule Take 100 mg by mouth 2 times daily.  Melatonin 3 MG TABS Take 3 mg by mouth nightly.  polyethylene glycol (GLYCOLAX) powder Take 17 g by mouth every other day.  Acetaminophen (TYLENOL PO) Take  by mouth. 650 po/pr q4hr prn pain/fever       bisacodyl (DULCOLAX) 10 MG suppository Place 10 mg rectally 2 times daily as needed          ALLERGIES    Allergies   Allergen Reactions    Polymyxin B Other (See Comments)     Unknown reaction    Polytrim [Polymyxin B-Trimethoprim]        FAMILY HISTORY    History reviewed. No pertinent family history.     SOCIAL HISTORY    Social History     Socioeconomic History    Marital status: Single     Spouse name: None    Number of children: None    Years of education: None    Highest education level: None   Occupational History    None   Social Needs    Financial resource strain: None    Food insecurity     Worry: None     Inability: None    Transportation needs     Medical: None     Non-medical: None   Tobacco Use    Smoking status: Never Smoker    Smokeless tobacco: Never Used   Substance and Sexual Activity    Alcohol use: No    Drug use: No    Sexual activity: Not Currently   Lifestyle    Physical activity     Days per week: None     Minutes per session: None    Stress: None   Relationships    Social connections     Talks on phone: None     Gets together: None     Attends Confucianist service: None     Active member of club or organization: None     Attends meetings of clubs or organizations: None     Relationship status: None    Intimate partner violence     Fear of current or ex partner: None     Emotionally abused: None     Physically abused: None     Forced sexual activity: None   Other Topics Concern    None   Social History Narrative    None       REVIEW OF SYSTEMS    10 systems reviewed, pertinent positives per HPI otherwise noted to be negative      PHYSICAL EXAM  Vitals:    11/28/20 0905   BP: (!) 140/88   Pulse: 74   Resp: 18   Temp: 98.2 °F (36.8 °C)   SpO2: 100%       GENERAL: Patient is well-developed, thin and chronically ill,  no acute distress. No apparent discomfort. Non toxic appearing. HEENT:  Normocephalic. Large hematoma to the left forehead just above the eyebrow with laceration. There are no bony depressions, instability, step-off, or crepitus to palpation of the facial bones. There is no raccoon's eyes or battles sign observed. NECK: Supple with normal ROM. Trachea midline. There is no tenderness to palpation of the cervical midline spine. LUNGS:  Normal work of breathing. Breath sounds clear throughout all lung fields. CADIOVASCULAR:  Regular rate and rhythm. S1/S2 normal, no murmurs, rubs, or gallops on exam.     GI: Nondistended. EXTREMITIES: Normal ROM, no edema, no tenderness, or deformity. Distal pulses palpable. No gross deformities or trauma noted. Moving all extremities equally and appropriately. SKIN: Warm/dry. Laceration to the left forehead with large amount of saul-wound swelling. No rashes/lesions noted. NEUROLOGIC:  Patient is awake, severe MR and non-verbal. Per caregivers at his baseline. Procedure  The procedure, hazards, and the alternatives were discussed. Patient had full decisional capacity, voiced understanding and gave verbal consent to proceed. 2% plain lidocaine mixed with 0.5% plain marcaine was used to obtain local anesthesia at the site of the wound. The wound was cleansed and irrigated and then prepped with antiseptic and draped in sterile fashion.  The wound was explored to determine if there was any foreign body or debris. Then the wound was explored further to rule out tear to the galea or any bone involvement. Wound edges were closed with 4.0 fast gut in simple interrupted fashion. 3 total sutures were placed. Repaired length of the wound is 1.5 cm. There were no complications during the procedure and overall patient tolerated it wellHemostasis was obtained and topical antibacterial ointment was applied. LABS  No results found for this visit on 11/28/20. RADIOLOGY    No results found. ED COURSE/MDM  Patient seen and evaluated. Old records reviewed. I have evaluated this patient. My supervising physician was available for consultation. Valente Donnelly presented to the ED today with above noted complaints. Physical exam reveals left forehead hematoma with small laceration. The patient is without signs of basilar skull fracture. The patient is nonverbal with severe MR. Caregivers are at bedside and I also spoke to other caregiver over the phone and he is behaving per his baseline. I have taken care of the patient in the past and currently he does seem appropriate for his baseline. Caregiver stated that patient will not tolerate scans without sedation, since he had no evidence of vomiting, he is at his baseline and he is in a group facility with 24-hour care I will hold off on CT scans at this time. Caregivers were instructed to return to the hospital if the patient develops any changes in his mental status or behaviors. Laceration repaired per above procedure note. The patient's caregivers were instructed on wound care, signs and symptoms of infection, when to return to a health care provider. The patient's caregivers verbalized understanding and agreement with the plan. At this point I do not feel the patient requires further work up and it is reasonable to discharge the patient.      A discussion was had with the patient regarding diagnosis, treatment/ plan of care, and follow up. All questions were answered. Patient will follow up as directed for further evaluation/treatment. The patient was given strict return precautions as we discussed symptoms that would necessitate return to the ED. Patient will return to ED for new/worsening symptoms. The patient verbalized their understanding and agreement with the above plan. Please refer to AVS for further details of the discharge instructions. Patient was sent home with a prescription for below medication/s. I did [place] patient on appropriate use of these medication. New Prescriptions    No medications on file       I estimate there is LOW risk for SUBARACHNOID HEMORRHAGE, MENINGITIS, INTRACRANIAL HEMORRHAGE, SUBDURAL HEMATOMA, OR STROKE, thus I consider the discharge disposition reasonable. [facility] and I have discussed the diagnosis and risks, and we agree with discharging home to follow-up with their primary doctor. We also discussed returning to the Emergency Department immediately if new or worsening symptoms occur. We have discussed the symptoms which are most concerning (e.g., changing or worsening pain, weakness, vomiting, fever) that necessitate immediate return. Clinical Impression  1. Facial laceration, initial encounter    2. Injury of head, initial encounter        Discharge Vital Signs:  Blood pressure (!) 140/88, pulse 74, temperature 98.2 °F (36.8 °C), temperature source Temporal, resp. rate 18, weight 120 lb (54.4 kg), SpO2 100 %. FOLLOW UP  Evelin Holman MD  [address]  [phone]    Call   As needed    [facility] [address] [phone]  Go to   If symptoms worsen      DISPOSITION  Patient was discharged to home in good condition.     Comment: Please note this report has been produced using speech recognition software and may contain errors related to that system including errors in grammar, punctuation, and spelling, as well as words and phrases that may be inappropriate. If there are any questions or concerns please feel free to contact the dictating provider for clarification.         ELIZABET Stubbs - CNP  11/28/20 1000

## 2020-11-28 NOTE — ED NOTES
Pt meets fall risk, precautions in place, pt is seat belted into his wheel chair.  Caregiver is at bedside       Prasad Bal RN  11/28/20 6659

## 2022-01-07 ENCOUNTER — APPOINTMENT (OUTPATIENT)
Dept: CT IMAGING | Age: 47
DRG: 247 | End: 2022-01-07
Payer: MEDICAID

## 2022-01-07 ENCOUNTER — APPOINTMENT (OUTPATIENT)
Dept: GENERAL RADIOLOGY | Age: 47
DRG: 247 | End: 2022-01-07
Payer: MEDICAID

## 2022-01-07 ENCOUNTER — HOSPITAL ENCOUNTER (INPATIENT)
Age: 47
LOS: 6 days | Discharge: HOME | DRG: 247 | End: 2022-01-13
Attending: EMERGENCY MEDICINE | Admitting: INTERNAL MEDICINE
Payer: MEDICAID

## 2022-01-07 DIAGNOSIS — R73.9 HYPERGLYCEMIA: ICD-10-CM

## 2022-01-07 DIAGNOSIS — K56.609 SMALL BOWEL OBSTRUCTION (HCC): Primary | ICD-10-CM

## 2022-01-07 LAB
A/G RATIO: 0.9 (ref 1.1–2.2)
ALBUMIN SERPL-MCNC: 3.7 G/DL (ref 3.4–5)
ALP BLD-CCNC: 174 U/L (ref 40–129)
ALT SERPL-CCNC: 22 U/L (ref 10–40)
ANION GAP SERPL CALCULATED.3IONS-SCNC: 20 MMOL/L (ref 3–16)
AST SERPL-CCNC: 26 U/L (ref 15–37)
BASOPHILS ABSOLUTE: 0 K/UL (ref 0–0.2)
BASOPHILS RELATIVE PERCENT: 0.2 %
BILIRUB SERPL-MCNC: 0.4 MG/DL (ref 0–1)
BUN BLDV-MCNC: 23 MG/DL (ref 7–20)
CALCIUM SERPL-MCNC: 8.8 MG/DL (ref 8.3–10.6)
CHLORIDE BLD-SCNC: 98 MMOL/L (ref 99–110)
CO2: 23 MMOL/L (ref 21–32)
CREAT SERPL-MCNC: 1.1 MG/DL (ref 0.9–1.3)
EOSINOPHILS ABSOLUTE: 0 K/UL (ref 0–0.6)
EOSINOPHILS RELATIVE PERCENT: 0.1 %
GFR AFRICAN AMERICAN: >60
GFR NON-AFRICAN AMERICAN: >60
GLUCOSE BLD-MCNC: 451 MG/DL (ref 70–99)
GLUCOSE BLD-MCNC: 463 MG/DL (ref 70–99)
GLUCOSE BLD-MCNC: 477 MG/DL (ref 70–99)
GLUCOSE BLD-MCNC: 547 MG/DL (ref 70–99)
HCT VFR BLD CALC: 48.5 % (ref 40.5–52.5)
HEMOGLOBIN: 15.6 G/DL (ref 13.5–17.5)
LACTIC ACID, SEPSIS: 1.9 MMOL/L (ref 0.4–1.9)
LACTIC ACID, SEPSIS: 2.3 MMOL/L (ref 0.4–1.9)
LIPASE: 10 U/L (ref 13–60)
LYMPHOCYTES ABSOLUTE: 1.5 K/UL (ref 1–5.1)
LYMPHOCYTES RELATIVE PERCENT: 12.3 %
MCH RBC QN AUTO: 29.4 PG (ref 26–34)
MCHC RBC AUTO-ENTMCNC: 32.1 G/DL (ref 31–36)
MCV RBC AUTO: 91.6 FL (ref 80–100)
MONOCYTES ABSOLUTE: 0.6 K/UL (ref 0–1.3)
MONOCYTES RELATIVE PERCENT: 4.6 %
NEUTROPHILS ABSOLUTE: 10 K/UL (ref 1.7–7.7)
NEUTROPHILS RELATIVE PERCENT: 82.8 %
PDW BLD-RTO: 14.8 % (ref 12.4–15.4)
PERFORMED ON: ABNORMAL
PLATELET # BLD: 198 K/UL (ref 135–450)
PMV BLD AUTO: 8.7 FL (ref 5–10.5)
POTASSIUM REFLEX MAGNESIUM: 4.7 MMOL/L (ref 3.5–5.1)
RBC # BLD: 5.3 M/UL (ref 4.2–5.9)
SODIUM BLD-SCNC: 141 MMOL/L (ref 136–145)
TOTAL PROTEIN: 7.6 G/DL (ref 6.4–8.2)
WBC # BLD: 12.1 K/UL (ref 4–11)

## 2022-01-07 PROCEDURE — 6360000004 HC RX CONTRAST MEDICATION: Performed by: PHYSICIAN ASSISTANT

## 2022-01-07 PROCEDURE — 2580000003 HC RX 258: Performed by: INTERNAL MEDICINE

## 2022-01-07 PROCEDURE — 83690 ASSAY OF LIPASE: CPT

## 2022-01-07 PROCEDURE — 85025 COMPLETE CBC W/AUTO DIFF WBC: CPT

## 2022-01-07 PROCEDURE — 74019 RADEX ABDOMEN 2 VIEWS: CPT

## 2022-01-07 PROCEDURE — 74177 CT ABD & PELVIS W/CONTRAST: CPT

## 2022-01-07 PROCEDURE — 99254 IP/OBS CNSLTJ NEW/EST MOD 60: CPT | Performed by: SURGERY

## 2022-01-07 PROCEDURE — 6370000000 HC RX 637 (ALT 250 FOR IP): Performed by: INTERNAL MEDICINE

## 2022-01-07 PROCEDURE — 74018 RADEX ABDOMEN 1 VIEW: CPT

## 2022-01-07 PROCEDURE — 96360 HYDRATION IV INFUSION INIT: CPT

## 2022-01-07 PROCEDURE — 6360000002 HC RX W HCPCS: Performed by: PHYSICIAN ASSISTANT

## 2022-01-07 PROCEDURE — 80053 COMPREHEN METABOLIC PANEL: CPT

## 2022-01-07 PROCEDURE — 2580000003 HC RX 258: Performed by: PHYSICIAN ASSISTANT

## 2022-01-07 PROCEDURE — 96361 HYDRATE IV INFUSION ADD-ON: CPT

## 2022-01-07 PROCEDURE — 83605 ASSAY OF LACTIC ACID: CPT

## 2022-01-07 PROCEDURE — 1200000000 HC SEMI PRIVATE

## 2022-01-07 PROCEDURE — 99284 EMERGENCY DEPT VISIT MOD MDM: CPT

## 2022-01-07 RX ORDER — PROCHLORPERAZINE EDISYLATE 5 MG/ML
5 INJECTION INTRAMUSCULAR; INTRAVENOUS EVERY 6 HOURS PRN
Status: DISCONTINUED | OUTPATIENT
Start: 2022-01-07 | End: 2022-01-13 | Stop reason: HOSPADM

## 2022-01-07 RX ORDER — KETOCONAZOLE 20 MG/ML
SHAMPOO TOPICAL WEEKLY
Status: DISCONTINUED | OUTPATIENT
Start: 2022-01-07 | End: 2022-01-13 | Stop reason: HOSPADM

## 2022-01-07 RX ORDER — POLYETHYLENE GLYCOL 3350 17 G/17G
17 POWDER, FOR SOLUTION ORAL DAILY PRN
Status: DISCONTINUED | OUTPATIENT
Start: 2022-01-07 | End: 2022-01-13 | Stop reason: HOSPADM

## 2022-01-07 RX ORDER — SODIUM CHLORIDE 9 MG/ML
25 INJECTION, SOLUTION INTRAVENOUS PRN
Status: DISCONTINUED | OUTPATIENT
Start: 2022-01-07 | End: 2022-01-13 | Stop reason: HOSPADM

## 2022-01-07 RX ORDER — ACETAMINOPHEN 650 MG/1
650 SUPPOSITORY RECTAL EVERY 6 HOURS PRN
Status: DISCONTINUED | OUTPATIENT
Start: 2022-01-07 | End: 2022-01-13 | Stop reason: HOSPADM

## 2022-01-07 RX ORDER — DIAZEPAM 10 MG/2ML
10 GEL RECTAL PRN
Status: DISCONTINUED | OUTPATIENT
Start: 2022-01-07 | End: 2022-01-13 | Stop reason: HOSPADM

## 2022-01-07 RX ORDER — ONDANSETRON 4 MG/1
4 TABLET, ORALLY DISINTEGRATING ORAL EVERY 8 HOURS PRN
Status: DISCONTINUED | OUTPATIENT
Start: 2022-01-07 | End: 2022-01-13 | Stop reason: HOSPADM

## 2022-01-07 RX ORDER — SODIUM CHLORIDE 0.9 % (FLUSH) 0.9 %
5-40 SYRINGE (ML) INJECTION PRN
Status: DISCONTINUED | OUTPATIENT
Start: 2022-01-07 | End: 2022-01-13 | Stop reason: HOSPADM

## 2022-01-07 RX ORDER — SODIUM CHLORIDE 0.9 % (FLUSH) 0.9 %
5-40 SYRINGE (ML) INJECTION EVERY 12 HOURS SCHEDULED
Status: DISCONTINUED | OUTPATIENT
Start: 2022-01-07 | End: 2022-01-13 | Stop reason: HOSPADM

## 2022-01-07 RX ORDER — LORAZEPAM 2 MG/ML
2 INJECTION INTRAMUSCULAR EVERY 6 HOURS PRN
Status: DISCONTINUED | OUTPATIENT
Start: 2022-01-07 | End: 2022-01-13 | Stop reason: HOSPADM

## 2022-01-07 RX ORDER — ONDANSETRON 2 MG/ML
4 INJECTION INTRAMUSCULAR; INTRAVENOUS EVERY 6 HOURS PRN
Status: DISCONTINUED | OUTPATIENT
Start: 2022-01-07 | End: 2022-01-13 | Stop reason: HOSPADM

## 2022-01-07 RX ORDER — LORAZEPAM 2 MG/ML
2 INJECTION INTRAMUSCULAR ONCE
Status: COMPLETED | OUTPATIENT
Start: 2022-01-07 | End: 2022-01-07

## 2022-01-07 RX ORDER — ACETAMINOPHEN 325 MG/1
650 TABLET ORAL EVERY 6 HOURS PRN
Status: DISCONTINUED | OUTPATIENT
Start: 2022-01-07 | End: 2022-01-13 | Stop reason: HOSPADM

## 2022-01-07 RX ORDER — MORPHINE SULFATE 2 MG/ML
2 INJECTION, SOLUTION INTRAMUSCULAR; INTRAVENOUS EVERY 12 HOURS PRN
Status: DISCONTINUED | OUTPATIENT
Start: 2022-01-07 | End: 2022-01-13 | Stop reason: HOSPADM

## 2022-01-07 RX ORDER — SODIUM CHLORIDE, SODIUM LACTATE, POTASSIUM CHLORIDE, AND CALCIUM CHLORIDE .6; .31; .03; .02 G/100ML; G/100ML; G/100ML; G/100ML
30 INJECTION, SOLUTION INTRAVENOUS ONCE
Status: COMPLETED | OUTPATIENT
Start: 2022-01-07 | End: 2022-01-07

## 2022-01-07 RX ORDER — SODIUM CHLORIDE 9 MG/ML
INJECTION, SOLUTION INTRAVENOUS CONTINUOUS
Status: ACTIVE | OUTPATIENT
Start: 2022-01-07 | End: 2022-01-08

## 2022-01-07 RX ADMIN — LORAZEPAM 2 MG: 2 INJECTION INTRAMUSCULAR; INTRAVENOUS at 18:45

## 2022-01-07 RX ADMIN — INSULIN LISPRO 3 UNITS: 100 INJECTION, SOLUTION INTRAVENOUS; SUBCUTANEOUS at 22:04

## 2022-01-07 RX ADMIN — IOPAMIDOL 75 ML: 755 INJECTION, SOLUTION INTRAVENOUS at 14:01

## 2022-01-07 RX ADMIN — SODIUM CHLORIDE: 9 INJECTION, SOLUTION INTRAVENOUS at 21:43

## 2022-01-07 RX ADMIN — SODIUM CHLORIDE, POTASSIUM CHLORIDE, SODIUM LACTATE AND CALCIUM CHLORIDE 1716 ML: 600; 310; 30; 20 INJECTION, SOLUTION INTRAVENOUS at 13:03

## 2022-01-07 ASSESSMENT — PAIN SCALES - WONG BAKER
WONGBAKER_NUMERICALRESPONSE: 2
WONGBAKER_NUMERICALRESPONSE: 0
WONGBAKER_NUMERICALRESPONSE: 0

## 2022-01-07 NOTE — ED NOTES
Called general surgery consult @[phone]  Re: SBO per Abimbola Hubbard in ED @[phone]     Dulcie Ditto Naegele  01/07/22 1603

## 2022-01-07 NOTE — H&P
Hospital Medicine History & Physical      PCP: Jae Dominguez MD    Date of Admission: 1/7/2022    Date of Service: Pt seen/examined on 1/7/22 and Admitted to Inpatient with expected LOS greater than two midnights due to medical therapy. Chief Complaint:  n/v      History Of Present Illness: Per EMR as pt is nonverbal and MRDD    42 y.o. male with hx of dm2, gerd, MRDD who presented to [address] [place] [address] with n/v and constipation. Constipation has been ongoing for the past 5 days and pt developed n/v today so he was sent in for evaluation.  -no mention of prior abdominal surgery in emr    ER course: labs obtained, CTa/p obtained, gen surg consulted    Past Medical History:          Diagnosis Date    Acne     Allergic rhinitis     Blindness, legal     Constipation     Dehydration     Dermatitis     Diabetes mellitus ([address]     Epilepsy ([address]     GERD (gastroesophageal reflux disease)     GERD (gastroesophageal reflux disease)     Hearing loss     Hypokalemia     Hypothyroidism     Impulse control disorder     Incontinence     Insomnia     Mental retardation     Mental retardation, profound (I.Q. < 20)     Microcephaly (HCC)     MRSA (methicillin resistant staph aureus) culture positive 1/15/16    hand    Non-verbal learning disorder     PAD (peripheral artery disease) ([address]     Pneumonia     Scoliosis     Thyroid disease        Past Surgical History:          Procedure Laterality Date    CHEST TUBE INSERTION      upper lobes bilat; when born   [facility] OTHER SURGICAL HISTORY Right 1-7-16    excision thumb mass       Medications Prior to Admission:      Prior to Admission medications    Medication Sig Start Date End Date Taking?  Authorizing Provider   lamoTRIgine (LAMICTAL) 200 MG tablet Take 2 tablets by mouth every morning 12/14/18   Kalyani Calixto MD   lamoTRIgine (LAMICTAL) 150 MG tablet Take 3 tablets by mouth nightly 12/13/18   Kalyani Calixto MD   zonisamide (ZONEGRAN) 100 MG capsule Take 4 capsules by mouth daily 12/14/18   Brando Santiago MD   Benzoyl Peroxide 5 % LOTN Apply topically as needed (Acne)    Historical Provider, MD   acetaminophen (TYLENOL) 650 MG suppository Place 650 mg rectally every 4 hours as needed for Fever    Historical Provider, MD   Cholecalciferol 400 UNIT TABS tablet Take 400 Units by mouth daily    Historical Provider, MD   carBAMazepine (CARBATROL) 200 MG CR capsule Take 2 capsules by mouth every 12 hours 1/18/16   Graeme Victor MD   diazepam (DIASTAT ACUDIAL) 10 MG GEL Place 10 mg rectally as needed Inject 10mg rectally as needed for seizure lasting longer than 3 minutes. 1/18/16   Graeme Victor MD   gabapentin (NEURONTIN) 300 MG capsule Take 1 capsule by mouth 4 times daily 1/18/16   Graeme Victor MD   ketoconazole (NIZORAL) 2 % shampoo Apply topically once a week     Historical Provider, MD   insulin glargine (LANTUS) 100 UNIT/ML injection vial Inject 12 Units into the skin every morning     Historical Provider, MD   levothyroxine (SYNTHROID) 75 MCG tablet Take 75 mcg by mouth Daily    Historical Provider, MD   insulin aspart (NOVOLOG) 100 UNIT/ML injection pen Inject into the skin 3 times daily (before meals) Sliding scale  0700,1200,1600  Less than 200 -None  200-299  4 units  300-399  5 units  400-499  6   500 and above, call MD    Historical Provider, MD   glucagon 1 MG injection Infuse 1 kit intravenously once If pt is unresponsive and BS less than 50    Historical Provider, MD   glucose (GLUTOSE) 40 % GEL Take 15 g by mouth See Admin Instructions if pt is symptomatic and BS less than 50    Historical Provider, MD   omeprazole (PRILOSEC) 20 MG capsule Take 20 mg by mouth daily. Historical Provider, MD   sertraline (ZOLOFT) 50 MG tablet Take 50 mg by mouth nightly. Historical Provider, MD   simethicone (MYLICON) 80 MG chewable tablet Take 80 mg by mouth three times daily.       Historical Provider, MD   Sodium Phosphates (FLEET ENEMA) 7-19 GM/118ML ENEM Place 118 mLs rectally as needed. Insert 1 rectally as needed for constipation if no BM in 4 Days (x1)     Historical Provider, MD   aspirin 81 MG chewable tablet Take 81 mg by mouth daily. Historical Provider, MD   docusate sodium (COLACE) 100 MG capsule Take 100 mg by mouth 2 times daily. Historical Provider, MD   Melatonin 3 MG TABS Take 3 mg by mouth nightly. Historical Provider, MD   polyethylene glycol (GLYCOLAX) powder Take 17 g by mouth every other day. Historical Provider, MD   Acetaminophen (TYLENOL PO) Take  by mouth. 650 po/pr q4hr prn pain/fever     Historical Provider, MD   bisacodyl (DULCOLAX) 10 MG suppository Place 10 mg rectally 2 times daily as needed     Historical Provider, MD       Allergies:  Polymyxin b and Polytrim [polymyxin b-trimethoprim]    Social History:      The patient currently lives at group home    TOBACCO:   reports that he has never smoked. He has never used smokeless tobacco.  ETOH:   reports no history of alcohol use. E-Cigarettes/Vaping Use     Questions Responses    E-Cigarette/Vaping Use Never User    Start Date     Passive Exposure     Quit Date     Counseling Given     Comments             Family History:       Reviewed in detail and negative for DM, CAD, Cancer, CVA. Positive as follows:    History reviewed. No pertinent family history. REVIEW OF SYSTEMS COMPLETED:   Pertinent positives as noted in the HPI. All other systems reviewed and negative. PHYSICAL EXAM PERFORMED:    /74   Pulse 104   Temp 98 °F (36.7 °C) (Axillary)   Resp 18   Ht 5' 9\" (1.753 m)   Wt 126 lb (57.2 kg)   SpO2 97%   BMI 18.61 kg/m²     General appearance:  No apparent distress, appears stated age and cooperative. HEENT:  Normal cephalic, atraumatic without obvious deformity. Pupils equal, round, and reactive to light. Extra ocular muscles intact. Conjunctivae/corneas clear. Neck: Supple, with full range of motion.  No jugular venous distention. Trachea midline. Respiratory:  Normal respiratory effort. Clear to auscultation, bilaterally without Rales/Wheezes/Rhonchi. Cardiovascular:  Regular rate and rhythm with normal S1/S2 without murmurs, rubs or gallops. Abdomen: Soft, non-tender, distended with hypoactive bowel sounds. Musculoskeletal:  No clubbing, cyanosis or edema bilaterally. Full range of motion without deformity. Skin: Skin color, texture, turgor normal.  No rashes or lesions. Neurologic:  Neurovascularly intact without any focal sensory/motor deficits. Cranial nerves: II-XII intact, grossly non-focal.  Psychiatric:  Alert and oriented, thought content appropriate, normal insight  Capillary Refill: Brisk,3 seconds, normal  Peripheral Pulses: +2 palpable, equal bilaterally       Labs:     Recent Labs     01/07/22  1302   WBC 12.1*   HGB 15.6   HCT 48.5        Recent Labs     01/07/22  1302      K 4.7   CL 98*   CO2 23   BUN 23*   CREATININE 1.1   CALCIUM 8.8     Recent Labs     01/07/22  1302   AST 26   ALT 22   BILITOT 0.4   ALKPHOS 174*     No results for input(s): INR in the last 72 hours. No results for input(s): Myrla Greulich in the last 72 hours. Urinalysis:      Lab Results   Component Value Date    NITRU Negative 12/10/2018    WBCUA 3-5 12/10/2018    BACTERIA Rare 12/10/2018    RBCUA 0-2 12/10/2018    BLOODU Negative 12/10/2018    SPECGRAV 1.020 12/10/2018    GLUCOSEU Negative 12/10/2018    GLUCOSEU >=1000 04/25/2012       Radiology:     CXR: I have reviewed the CXR with the following interpretation: na  EKG:  I have reviewed the EKG with the following interpretation: na    CT ABDOMEN PELVIS W IV CONTRAST Additional Contrast? None   Final Result   Abnormally dilated stomach and proximal small bowel loops with relative   collapse of distal small bowel loops. , Compatible with small bowel   obstruction.  There is a subtle swirled appearance to the mesentery as seen on   image number 98 with a questionable internal hernia on image number 112 in   the midline. Reactive fluid is seen in the pelvis      RECOMMENDATIONS:   Unavailable             ASSESSMENT:    Active Hospital Problems    Diagnosis Date Noted    SBO (small bowel obstruction) ([facility] [K56.609] 01/07/2022         PLAN:    N/v/constipation- due to SBO, noted on CTa/p(abnormally dilated stomach and prox small bowel loops with relative collapse of distal small bowel loops)  -NG ordered in ER  -Gen surg consulted, apprec mgmt  -kept NPO  -Tele x 72hrs  -prn iv morphine for pain  -gentle ivfs started(50/hr x 15 hrs)    DM2- per emr  Check a1c  q6h bs  -low ssi  -Held neurontin, asa  -held home lantus     Hypothyroid  -Held synthroid    GERD- -Held home po ppi    Seizure d/o- appears stable  -placed in seizure precautions, tele  -Held carbamazepine until taking PO  -Held zonisamide  -Held Lamotrigine  -continued home rectal diazepam    MRDD- mgmt as outpt    Depression- held zoloft    DVT Prophylaxis: lovenox  Diet: NPO  Code Status: FULL    PT/OT Eval Status: not ordered    Dispo - pending improvement, resume home meds when able       Vanesa Everett MD    Thank you Enoch Kim MD for the opportunity to be involved in this patient's care. If you have any questions or concerns please feel free to contact me at [phone].

## 2022-01-07 NOTE — ED NOTES
NG tube placed at 1744 in right nare. Soft restraints applied per verbal order to bilateral wrists. Confirmed placement with bowel sounds and bile. Xray to bedside to complete KUB. After xray pt proceeded to pull out NG tube even though in restraints. At 1802 this RN re-attempted to place NG tube. There was too much resistance from pt and RN was not able to place NG tube.      Cookie Tan RN  01/07/22 9330

## 2022-01-07 NOTE — ED PROVIDER NOTES
[address] [facility]  ED  EMERGENCY DEPARTMENT ENCOUNTER        Pt Name: Olinda Hall  MRN: [id number]  [place] 1975  Date of evaluation: 1/7/2022  Provider: Molly Meeks PA-C  PCP: Chavez Patrick MD  Note Started: 6:48 PM EST       I have seen and evaluated this patient with my supervising physician Cameron Arizmendi. Triage CHIEF COMPLAINT       Chief Complaint   Patient presents with    Constipation     Pt from [address] [place] [address]. Caregiver states that pt hasn't had a bowel movement in five days. Abd distended, loss of appetite. Enema and Pedialyte given without success. HISTORY OF PRESENT ILLNESS   (Location/Symptom, Timing/Onset, Context/Setting, Quality, Duration, Modifying Factors, Severity)  Note limiting factors. Chief Complaint: Nausea vomiting and abdominal distention    Olinda Hall is a 42 y.o. male who presents to the emergency department, this patient is a patient in a group home, the supervisor gives the history, the staff called her said that he was having some episodes of nausea and vomiting, she went over to evaluate the patient and he appeared to be 7 months pregnant. She did call the nursing line and they tried an enema, and some Pedialyte and while he was more alert today he was still very distended and obviously in some type of pain and decided to bring him here to the emergency department. Patient is nonverbal.    Nursing Notes were all reviewed and agreed with or any disagreements were addressed in the HPI.     REVIEW OF SYSTEMS    (2-9 systems for level 4, 10 or more for level 5)     Review of Systems   Unable to perform ROS: Patient nonverbal       PAST MEDICAL HISTORY     Past Medical History:   Diagnosis Date    Acne     Allergic rhinitis     Blindness, legal     Constipation     Dehydration     Dermatitis     Diabetes mellitus [facility]     Epilepsy ([facility]     GERD (gastroesophageal reflux disease)     GERD (gastroesophageal reflux disease)     Hearing loss     Hypokalemia     Hypothyroidism     Impulse control disorder     Incontinence     Insomnia     Mental retardation     Mental retardation, profound (I.Q. < 20)     Microcephaly (HCC)     MRSA (methicillin resistant staph aureus) culture positive 1/15/16    hand    Non-verbal learning disorder     [facility]) [facility]     Pneumonia     Scoliosis     Thyroid disease        SURGICAL HISTORY     Past Surgical History:   Procedure Laterality Date    CHEST TUBE INSERTION      upper lobes bilat; when born   Dayan Ruiz OTHER SURGICAL HISTORY Right 1-7-16    excision thumb mass       CURRENTMEDICATIONS       Previous Medications    ACETAMINOPHEN (TYLENOL PO)    Take  by mouth. 650 po/pr q4hr prn pain/fever     ACETAMINOPHEN (TYLENOL) 650 MG SUPPOSITORY    Place 650 mg rectally every 4 hours as needed for Fever    ASPIRIN 81 MG CHEWABLE TABLET    Take 81 mg by mouth daily. BENZOYL PEROXIDE 5 % LOTN    Apply topically as needed (Acne)    BISACODYL (DULCOLAX) 10 MG SUPPOSITORY    Place 10 mg rectally 2 times daily as needed     CARBAMAZEPINE (CARBATROL) 200 MG CR CAPSULE    Take 2 capsules by mouth every 12 hours    CHOLECALCIFEROL 400 UNIT TABS TABLET    Take 400 Units by mouth daily    DIAZEPAM (DIASTAT ACUDIAL) 10 MG GEL    Place 10 mg rectally as needed Inject 10mg rectally as needed for seizure lasting longer than 3 minutes. DOCUSATE SODIUM (COLACE) 100 MG CAPSULE    Take 100 mg by mouth 2 times daily.       GABAPENTIN (NEURONTIN) 300 MG CAPSULE    Take 1 capsule by mouth 4 times daily    GLUCAGON 1 MG INJECTION    Infuse 1 kit intravenously once If pt is unresponsive and BS less than 50    GLUCOSE (GLUTOSE) 40 % GEL    Take 15 g by mouth See Admin Instructions if pt is symptomatic and BS less than 50    INSULIN ASPART (NOVOLOG) 100 UNIT/ML INJECTION PEN    Inject into the skin 3 times daily (before meals) Sliding scale  0700,1200,1600  Less than 200 -None  200-299  4 units  300-399  5 units  400-499  6   500 and above, call MD    INSULIN GLARGINE (LANTUS) 100 UNIT/ML INJECTION VIAL    Inject 12 Units into the skin every morning     KETOCONAZOLE (NIZORAL) 2 % SHAMPOO    Apply topically once a week     LAMOTRIGINE (LAMICTAL) 150 MG TABLET    Take 3 tablets by mouth nightly    LAMOTRIGINE (LAMICTAL) 200 MG TABLET    Take 2 tablets by mouth every morning    LEVOTHYROXINE (SYNTHROID) 75 MCG TABLET    Take 75 mcg by mouth Daily    MELATONIN 3 MG TABS    Take 3 mg by mouth nightly. OMEPRAZOLE (PRILOSEC) 20 MG CAPSULE    Take 20 mg by mouth daily. POLYETHYLENE GLYCOL (GLYCOLAX) POWDER    Take 17 g by mouth every other day. SERTRALINE (ZOLOFT) 50 MG TABLET    Take 50 mg by mouth nightly. SIMETHICONE (MYLICON) 80 MG CHEWABLE TABLET    Take 80 mg by mouth three times daily. SODIUM PHOSPHATES (FLEET ENEMA) 7-19 GM/118ML ENEM    Place 118 mLs rectally as needed. Insert 1 rectally as needed for constipation if no BM in 4 Days (x1)     ZONISAMIDE (ZONEGRAN) 100 MG CAPSULE    Take 4 capsules by mouth daily       ALLERGIES     Polymyxin b and Polytrim [polymyxin b-trimethoprim]    FAMILYHISTORY     History reviewed. No pertinent family history.      SOCIAL HISTORY       Social History     Socioeconomic History    Marital status: Single     Spouse name: None    Number of children: None    Years of education: None    Highest education level: None   Occupational History    None   Tobacco Use    Smoking status: Never Smoker    Smokeless tobacco: Never Used   Vaping Use    Vaping Use: Never used   Substance and Sexual Activity    Alcohol use: No    Drug use: No    Sexual activity: Not Currently   Other Topics Concern    None   Social History Narrative    None     Social Determinants of Health     Financial Resource Strain:     Difficulty of Paying Living Expenses: Not on file   Food Insecurity:     Worried About Running Out of Food in the Last Year: Not on file  Ran Out of Food in the Last Year: Not on file   Transportation Needs:     Lack of Transportation (Medical): Not on file    Lack of Transportation (Non-Medical): Not on file   Physical Activity:     Days of Exercise per Week: Not on file    Minutes of Exercise per Session: Not on file   Stress:     Feeling of Stress : Not on file   Social Connections:     Frequency of Communication with Friends and Family: Not on file    Frequency of Social Gatherings with Friends and Family: Not on file    Attends Christianity Services: Not on file    Active Member of [address] [place] [address] [employer] or Organizations: Not on file    Attends Club or Organization Meetings: Not on file    Marital Status: Not on file   Intimate Partner Violence:     Fear of Current or Ex-Partner: Not on file    Emotionally Abused: Not on file    Physically Abused: Not on file    Sexually Abused: Not on file   Housing Stability:     Unable to Pay for Housing in the Last Year: Not on file    Number of [place] in the Last Year: Not on file    Unstable Housing in the Last Year: Not on file       SCREENINGS             PHYSICAL EXAM    (up to 7 for level 4, 8 or more for level 5)     ED Triage Vitals [01/07/22 1217]   BP Temp Temp Source Pulse Resp SpO2 Height Weight   127/84 98 °F (36.7 °C) Axillary 111 20 98 % 5' 9\" (1.753 m) 126 lb (57.2 kg)       Physical Exam  Vitals and nursing note reviewed. Constitutional:       Appearance: Normal appearance. He is well-developed. He is ill-appearing and toxic-appearing. He is not diaphoretic. HENT:      Head: Normocephalic and atraumatic. Right Ear: External ear normal.      Left Ear: External ear normal.      Nose: Nose normal.   Eyes:      General:         Right eye: No discharge. Left eye: No discharge. Cardiovascular:      Rate and Rhythm: Normal rate and regular rhythm. Heart sounds: Normal heart sounds. No murmur heard. No friction rub. No gallop.     Pulmonary:      Effort: Pulmonary effort is normal. No respiratory distress. Breath sounds: Normal breath sounds. No stridor. No wheezing or rales. Chest:      Chest wall: No tenderness. Abdominal:      General: There is distension. Tenderness: There is abdominal tenderness. There is guarding. Comments: Significant amount of tympany and high-pitched bowel sounds   Musculoskeletal:         General: Normal range of motion. Cervical back: Normal range of motion and neck supple. Skin:     General: Skin is warm and dry. Coloration: Skin is not pale. Neurological:      General: No focal deficit present. Mental Status: He is alert. Mental status is at baseline.    Psychiatric:         Mood and Affect: Mood normal.         Behavior: Behavior normal.         DIAGNOSTIC RESULTS   LABS:    Labs Reviewed   CBC WITH AUTO DIFFERENTIAL - Abnormal; Notable for the following components:       Result Value    WBC 12.1 (*)     Neutrophils Absolute 10.0 (*)     All other components within normal limits    Narrative:     Performed at:  [place] [address] [employer]   Phone [phone]   COMPREHENSIVE METABOLIC PANEL W/ REFLEX TO MG FOR LOW K - Abnormal; Notable for the following components:    Chloride 98 (*)     Anion Gap 20 (*)     Glucose 547 (*)     BUN 23 (*)     Albumin/Globulin Ratio 0.9 (*)     Alkaline Phosphatase 174 (*)     All other components within normal limits    Narrative:     Performed at:  [place] [address] [employer]   Phone [phone]   LIPASE - Abnormal; Notable for the following components:    Lipase 10.0 (*)     All other components within normal limits    Narrative:     Performed at:  [place] [address] [employer]   Phone [phone]   LACTATE, SEPSIS - Abnormal; Notable for the following components:    Lactic Acid, Sepsis 2.3 (*)     All other components within normal limits    Narrative:     Performed at:  [address],  [place], [address]   Phone [phone]   POCT GLUCOSE - Abnormal; Notable for the following components:    POC Glucose 463 (*)     All other components within normal limits    Narrative:     Performed at:  [address],  [place], [address]   Phone [phone]   POCT GLUCOSE - Abnormal; Notable for the following components:    POC Glucose 477 (*)     All other components within normal limits    Narrative:     Performed at:  [address],  [place], [address]   Phone [phone]   LACTATE, SEPSIS    Narrative:     Performed at:  [facility]) [address],  [place], [address]   Phone [phone]   POCT GLUCOSE   POCT GLUCOSE       When ordered, only abnormal lab results are displayed. All other labs were within normal range or not returned as of this dictation. EKG: When ordered, EKG's are interpreted by the Emergency Department Physician in the absence of a cardiologist.  Please see their note for interpretation of EKG. RADIOLOGY:   Non-plain film images such as CT, Ultrasound and MRI are read by the radiologist. Jose Tovar radiographic images are visualized andpreliminarily interpreted by the  ED Provider with the below findings:        Interpretation [facility] Radiologist below, if available at the time of this note:    XR ABDOMEN (2 VIEWS)   Final Result   Findings suspicious for a distal partial small bowel obstruction which may be   slightly less prominent from the prior CT scan. Mild constipation with no obvious colonic dilatation. CT ABDOMEN PELVIS W IV CONTRAST Additional Contrast? None   Final Result   Abnormally dilated stomach and proximal small bowel loops with relative   collapse of distal small bowel loops. , Compatible with small bowel   obstruction. There is a subtle swirled appearance to the mesentery as seen on   image number 98 with a questionable internal hernia on image number 112 in   the midline. Reactive fluid is seen in the pelvis      RECOMMENDATIONS:   Unavailable         XR ABDOMEN (KUB) (SINGLE AP VIEW)    (Results Pending)     CT ABDOMEN PELVIS W IV CONTRAST Additional Contrast? None    Result Date: 1/7/2022  EXAMINATION: CT OF THE ABDOMEN AND PELVIS WITH CONTRAST 1/7/2022 1:48 pm TECHNIQUE: CT of the abdomen and pelvis was performed with the administration of intravenous contrast. Multiplanar reformatted images are provided for review. Dose modulation, iterative reconstruction, and/or weight based adjustment of the mA/kV was utilized to reduce the radiation dose to as low as reasonably achievable. COMPARISON: November 2018 HISTORY: ORDERING SYSTEM PROVIDED HISTORY: abodominal pain TECHNOLOGIST PROVIDED HISTORY: Additional Contrast?->None Reason for exam:->abodominal pain Decision Support Exception - unselect if not a suspected or confirmed emergency medical condition->Emergency Medical Condition (MA) Reason for Exam: abd pain, distention, pt is non verbal, caretakers noticed symptoms yesterday Additional signs and symptoms: no prev surgery Relevant Medical/Surgical History: pt unable to cooperate forbreath hold due to mental retardation FINDINGS: Motion artifact degrades the study. This decreases sensitivity and specificity of the exam Lower Chest: Hazy opacity seen at the lung bases. No pleural effusions are noted Organs: No splenomegaly. Adrenal glands appear normal No hydronephrosis on the right. No hydronephrosis on the left Trace intrahepatic biliary ductal dilatation is seen. Small amount of perihepatic fluid is seen. Gallbladder is absent Pancreas is not well seen secondary to motion. GI/Bowel: Stomach is moderately distended. Proximal small bowel loops are moderately distended.   Distal small bowel loops in the right lower quadrant in the region of the terminal ileum are relatively contracted Scattered stool and gas is seen throughout the colon. There is a subtle swirled appearance to the mesentery as seen on image number 98 with a questionable internal hernia on image number 112 in the midline. Pelvis: Small amount of free fluid is seen within the pelvis. Peritoneum/Retroperitoneum: No retroperitoneal adenopathy. No retroperitoneal hematoma. There is trace atherosclerosis. Bones/Soft Tissues: No acute bony abnormality. Abnormally dilated stomach and proximal small bowel loops with relative collapse of distal small bowel loops. , Compatible with small bowel obstruction. There is a subtle swirled appearance to the mesentery as seen on image number 98 with a questionable internal hernia on image number 112 in the midline. Reactive fluid is seen in the pelvis RECOMMENDATIONS: Unavailable     XR ABDOMEN (2 VIEWS)    Result Date: 1/7/2022  EXAMINATION: TWO XRAY VIEWS OF THE ABDOMEN 1/7/2022 5:06 pm COMPARISON: CT scan 01/07/2022 HISTORY: ORDERING SYSTEM PROVIDED HISTORY: sbo TECHNOLOGIST PROVIDED HISTORY: Reason for exam:->sbo Reason for Exam: sbo, constipation FINDINGS: The stomach is moderately distended and there are multiple loops of moderately dilated small bowel along the upper abdomen extending inferiorly which is slightly less prominent. There is mild feces scattered in the colon which is nondilated. There is no free air. There are surgical clips along the right upper quadrant. No urinary calculi are seen. The bones are intact. There is contrast seen in bladder. Findings suspicious for a distal partial small bowel obstruction which may be slightly less prominent from the prior CT scan. Mild constipation with no obvious colonic dilatation.          PROCEDURES   Unless otherwise noted below, none     Procedures    CRITICAL CARE TIME   N/A    CONSULTS:  IP CONSULT TO GENERAL

## 2022-01-07 NOTE — ED PROVIDER NOTES
I independently performed a history and physical on [employer]. All diagnostic, treatment, and disposition decisions were made by myself in conjunction with the advanced practice provider. For further details of [facility] emergency department encounter, please see JANELLE Taylor's documentation. Patient with history of MRDD presents to the emergency department from [facility] home with complaints of constipation, nausea, and vomiting. Last full bowel movement was reportedly for 5 days ago. Vomiting began this morning. No additional complaints. Physical exam: General: No acute distress. Patient resting comfortably. Heart: Regular rate rhythm. Normal S1-S2. Lungs: Clear to auscultation abdomen: Soft. Diffuse abdominal distention without noted tenderness. No rebound, guarding. Normal bowel sounds. Assessment/plan:   1. Small bowel obstruction ([address]    2.  Hyperglycemia                 Aakash Manjit, DO  01/07/22 [facility],   01/07/22 4429

## 2022-01-07 NOTE — ED NOTES
Pt caregivers phone numbers:    Humza Chacon: [phone]  Eduar Graham: [phone]     Ling Cheney RN  01/07/22 9363

## 2022-01-07 NOTE — CONSULTS
Department of General Surgery Consult    PATIENT NAME: Myrna Sharma   YOB: 1975    ADMISSION DATE: 1/7/2022 12:14 PM      TODAY'S DATE: 1/7/2022    Reason for Consult:  sbo    Chief Complaint: emesis    Requesting Physician:  Edelmira Dimas    HISTORY OF PRESENT ILLNESS:              The patient is a 42 y.o. male who presents with nausea, emesis. No bowel movements for several days. Reportedly not eating either. No reported pain but is nonverbal.    Past Medical History:        Diagnosis Date    Acne     Allergic rhinitis     Blindness, legal     Constipation     Dehydration     Dermatitis     Diabetes mellitus ([address]     Epilepsy ([address]     GERD (gastroesophageal reflux disease)     GERD (gastroesophageal reflux disease)     Hearing loss     Hypokalemia     Hypothyroidism     Impulse control disorder     Incontinence     Insomnia     Mental retardation     Mental retardation, profound (I.Q. < 20)     Microcephaly (HCC)     MRSA (methicillin resistant staph aureus) culture positive 1/15/16    hand    Non-verbal learning disorder     PAD (peripheral [facility] disease) ([facility]     Pneumonia     Scoliosis     Thyroid disease        Past Surgical History:        Procedure Laterality Date    CHEST TUBE INSERTION      upper lobes bilat; when born   [facility] OTHER SURGICAL HISTORY Right 1-7-16    excision thumb mass       Current Medications:   No current facility-administered medications for this encounter. Prior to Admission medications    Medication Sig Start Date End Date Taking?  Authorizing Provider   lamoTRIgine (LAMICTAL) 200 MG tablet Take 2 tablets by mouth every morning 12/14/18  Yes Katarina Manzanares MD   lamoTRIgine (LAMICTAL) 150 MG tablet Take 3 tablets by mouth nightly 12/13/18  Yes Katarina Manzanares MD   zonisamide [facility]) 100 MG capsule Take 4 capsules by mouth daily 12/14/18  Yes Katarina Manzanares MD   Cholecalciferol 400 UNIT TABS tablet Take 400 Units by mouth daily   Yes Historical Provider, MD   gabapentin (NEURONTIN) 300 MG capsule Take 1 capsule by mouth 4 times daily 1/18/16  Yes Simona Clark MD   insulin glargine (LANTUS) 100 UNIT/ML injection vial Inject 12 Units into the skin every morning    Yes Historical Provider, MD   levothyroxine (SYNTHROID) 75 MCG tablet Take 75 mcg by mouth Daily   Yes Historical Provider, MD   insulin aspart (NOVOLOG) 100 UNIT/ML injection pen Inject into the skin 3 times daily (before meals) Sliding scale  0700,1200,1600  Less than 200 -None  200-299  4 units  300-399  5 units  400-499  6   500 and above, call MD   Yes Historical Provider, MD   omeprazole (PRILOSEC) 20 MG capsule Take 20 mg by mouth daily. Yes Historical Provider, MD   sertraline (ZOLOFT) 50 MG tablet Take 50 mg by mouth nightly. Yes Historical Provider, MD   simethicone (MYLICON) 80 MG chewable tablet Take 80 mg by mouth three times daily. Yes Historical Provider, MD   polyethylene glycol (GLYCOLAX) powder Take 17 g by mouth every other day. Yes Historical Provider, MD   Benzoyl Peroxide 5 % LOTN Apply topically as needed (Acne)    Historical Provider, MD   acetaminophen (TYLENOL) 650 MG suppository Place 650 mg rectally every 4 hours as needed for Fever    Historical Provider, MD   carBAMazepine (CARBATROL) 200 MG CR capsule Take 2 capsules by mouth every 12 hours 1/18/16   Simona Clark MD   diazepam (DIASTAT ACUDIAL) 10 MG GEL Place 10 mg rectally as needed Inject 10mg rectally as needed for seizure lasting longer than 3 minutes.  1/18/16   Simona Clark MD   ketoconazole (NIZORAL) 2 % shampoo Apply topically once a week     Historical Provider, MD   glucagon 1 MG injection Infuse 1 kit intravenously once If pt is unresponsive and BS less than 50    Historical Provider, MD   glucose (GLUTOSE) 40 % GEL Take 15 g by mouth See Admin Instructions if pt is symptomatic and BS less than 50    Historical Provider, MD   Sodium Phosphates (FLEET ENEMA) 7-19 GM/118ML ENEM Place 118 mLs rectally as needed. Insert 1 rectally as needed for constipation if no BM in 4 Days (x1)     Historical Provider, MD   aspirin 81 MG chewable tablet Take 81 mg by mouth daily. Historical Provider, MD   docusate sodium (COLACE) 100 MG capsule Take 100 mg by mouth 2 times daily. Historical Provider, MD   Melatonin 3 MG TABS Take 3 mg by mouth nightly. Historical Provider, MD   Acetaminophen (TYLENOL PO) Take  by mouth. 650 po/pr q4hr prn pain/fever     Historical Provider, MD   bisacodyl (DULCOLAX) 10 MG suppository Place 10 mg rectally 2 times daily as needed     Historical Provider, MD        Allergies:  Polymyxin b and Polytrim [polymyxin b-trimethoprim]    Social History:   TOBACCO:  no  ETOH:  no    Family History:    History reviewed. No pertinent family history. REVIEW OF SYSTEMS:  CONSTITUTIONAL:  negative  HEENT:  negative  RESPIRATORY:  negative  CARDIOVASCULAR:  negative  GASTROINTESTINAL:  negative except for nausea, vomiting and constipation  GENITOURINARY:  negative  HEMATOLOGIC/LYMPHATIC:  negative  NEUROLOGICAL:  Negative  * All other ROS reviewed and negative. PHYSICAL EXAM:  VITALS:  /74   Pulse 104   Temp 98 °F (36.7 °C) (Axillary)   Resp 18   Ht 5' 9\" (1.753 m)   Wt 126 lb (57.2 kg)   SpO2 97%   BMI 18.61 kg/m²   24HR INTAKE/OUTPUT:    No intake/output data recorded. No intake/output data recorded.       CONSTITUTIONAL:  alert, no apparent distress and normal weight  EYES:  PERRL, sclera clear  ENT:  Normocephalic,atraumatic, without obvious abnormality  NECK:  supple, symmetrical, trachea midline  LUNGS: Resp effort easy and unlabored  CARDIOVASCULAR:  NO JVD, tachycardic  ABDOMEN:  no scars, hypoactive bowel sounds, soft, distended, nonperitoneal,   MUSCULOSKELETAL: No clubbing or cyanosis, 0+ pitting edema lower extremities  NEUROLOGIC:  Mental Status Exam:  Level of Alertness:   awake  PSYCHIATRIC:   Not oriented  SKIN:  no satrr    DATA:    CBC:   Recent Labs     01/07/22  1302   WBC 12.1*   HGB 15.6   HCT 48.5        BMP:    Recent Labs     01/07/22  1302      K 4.7   CL 98*   CO2 23   BUN 23*   CREATININE 1.1   GLUCOSE 547*     Hepatic:   Recent Labs     01/07/22  1302   AST 26   ALT 22   BILITOT 0.4   ALKPHOS 174*     Mag:    No results for input(s): MG in the last 72 hours. Phos:   No results for input(s): PHOS in the last 72 hours. INR: No results for input(s): INR in the last 72 hours. Radiology Review: Images personally reviewed by me. CT - sbo, questionable mesenteric swirling, no pneumatosis      IMPRESSION/RECOMMENDATIONS:    43 yo with sbo  1. Lacking typical risk factors for sbo  2. Agree with NG. May need restraints.   3.  axr tomorrow    Electronically signed by Anthony Larson, [address] [place] [address]  [phone]

## 2022-01-08 ENCOUNTER — APPOINTMENT (OUTPATIENT)
Dept: GENERAL RADIOLOGY | Age: 47
DRG: 247 | End: 2022-01-08
Payer: MEDICAID

## 2022-01-08 LAB
ANION GAP SERPL CALCULATED.3IONS-SCNC: 14 MMOL/L (ref 3–16)
ANION GAP SERPL CALCULATED.3IONS-SCNC: 19 MMOL/L (ref 3–16)
BASOPHILS ABSOLUTE: 0 K/UL (ref 0–0.2)
BASOPHILS RELATIVE PERCENT: 0.2 %
BUN BLDV-MCNC: 18 MG/DL (ref 7–20)
BUN BLDV-MCNC: 19 MG/DL (ref 7–20)
CALCIUM SERPL-MCNC: 8.3 MG/DL (ref 8.3–10.6)
CALCIUM SERPL-MCNC: 8.6 MG/DL (ref 8.3–10.6)
CHLORIDE BLD-SCNC: 107 MMOL/L (ref 99–110)
CHLORIDE BLD-SCNC: 108 MMOL/L (ref 99–110)
CO2: 21 MMOL/L (ref 21–32)
CO2: 23 MMOL/L (ref 21–32)
CREAT SERPL-MCNC: 0.7 MG/DL (ref 0.9–1.3)
CREAT SERPL-MCNC: 0.9 MG/DL (ref 0.9–1.3)
EOSINOPHILS ABSOLUTE: 0.1 K/UL (ref 0–0.6)
EOSINOPHILS RELATIVE PERCENT: 0.7 %
GFR AFRICAN AMERICAN: >60
GFR AFRICAN AMERICAN: >60
GFR NON-AFRICAN AMERICAN: >60
GFR NON-AFRICAN AMERICAN: >60
GLUCOSE BLD-MCNC: 167 MG/DL (ref 70–99)
GLUCOSE BLD-MCNC: 182 MG/DL (ref 70–99)
GLUCOSE BLD-MCNC: 198 MG/DL (ref 70–99)
GLUCOSE BLD-MCNC: 211 MG/DL (ref 70–99)
GLUCOSE BLD-MCNC: 367 MG/DL (ref 70–99)
HCT VFR BLD CALC: 44.8 % (ref 40.5–52.5)
HEMOGLOBIN: 14.5 G/DL (ref 13.5–17.5)
LYMPHOCYTES ABSOLUTE: 2 K/UL (ref 1–5.1)
LYMPHOCYTES RELATIVE PERCENT: 23 %
MAGNESIUM: 2 MG/DL (ref 1.8–2.4)
MCH RBC QN AUTO: 29.3 PG (ref 26–34)
MCHC RBC AUTO-ENTMCNC: 32.4 G/DL (ref 31–36)
MCV RBC AUTO: 90.6 FL (ref 80–100)
MONOCYTES ABSOLUTE: 0.6 K/UL (ref 0–1.3)
MONOCYTES RELATIVE PERCENT: 7.2 %
NEUTROPHILS ABSOLUTE: 5.9 K/UL (ref 1.7–7.7)
NEUTROPHILS RELATIVE PERCENT: 68.9 %
PDW BLD-RTO: 14.6 % (ref 12.4–15.4)
PERFORMED ON: ABNORMAL
PLATELET # BLD: 204 K/UL (ref 135–450)
PMV BLD AUTO: 8.5 FL (ref 5–10.5)
POTASSIUM REFLEX MAGNESIUM: 3.4 MMOL/L (ref 3.5–5.1)
POTASSIUM SERPL-SCNC: 3.4 MMOL/L (ref 3.5–5.1)
RBC # BLD: 4.95 M/UL (ref 4.2–5.9)
SODIUM BLD-SCNC: 145 MMOL/L (ref 136–145)
SODIUM BLD-SCNC: 147 MMOL/L (ref 136–145)
WBC # BLD: 8.5 K/UL (ref 4–11)

## 2022-01-08 PROCEDURE — 36415 COLL VENOUS BLD VENIPUNCTURE: CPT

## 2022-01-08 PROCEDURE — 80048 BASIC METABOLIC PNL TOTAL CA: CPT

## 2022-01-08 PROCEDURE — 6370000000 HC RX 637 (ALT 250 FOR IP): Performed by: INTERNAL MEDICINE

## 2022-01-08 PROCEDURE — 2580000003 HC RX 258: Performed by: INTERNAL MEDICINE

## 2022-01-08 PROCEDURE — 99232 SBSQ HOSP IP/OBS MODERATE 35: CPT | Performed by: SURGERY

## 2022-01-08 PROCEDURE — 83735 ASSAY OF MAGNESIUM: CPT

## 2022-01-08 PROCEDURE — 6360000002 HC RX W HCPCS: Performed by: INTERNAL MEDICINE

## 2022-01-08 PROCEDURE — 1200000000 HC SEMI PRIVATE

## 2022-01-08 PROCEDURE — 85025 COMPLETE CBC W/AUTO DIFF WBC: CPT

## 2022-01-08 PROCEDURE — 74018 RADEX ABDOMEN 1 VIEW: CPT

## 2022-01-08 RX ORDER — DEXTROSE MONOHYDRATE 25 G/50ML
12.5 INJECTION, SOLUTION INTRAVENOUS PRN
Status: DISCONTINUED | OUTPATIENT
Start: 2022-01-08 | End: 2022-01-13 | Stop reason: HOSPADM

## 2022-01-08 RX ORDER — NICOTINE POLACRILEX 4 MG
15 LOZENGE BUCCAL PRN
Status: DISCONTINUED | OUTPATIENT
Start: 2022-01-08 | End: 2022-01-13 | Stop reason: HOSPADM

## 2022-01-08 RX ORDER — DEXTROSE MONOHYDRATE 50 MG/ML
100 INJECTION, SOLUTION INTRAVENOUS PRN
Status: DISCONTINUED | OUTPATIENT
Start: 2022-01-08 | End: 2022-01-13 | Stop reason: HOSPADM

## 2022-01-08 RX ADMIN — INSULIN LISPRO 1 UNITS: 100 INJECTION, SOLUTION INTRAVENOUS; SUBCUTANEOUS at 12:19

## 2022-01-08 RX ADMIN — INSULIN LISPRO 1 UNITS: 100 INJECTION, SOLUTION INTRAVENOUS; SUBCUTANEOUS at 19:55

## 2022-01-08 RX ADMIN — DEXTROSE AND SODIUM CHLORIDE: 5; 450 INJECTION, SOLUTION INTRAVENOUS at 11:35

## 2022-01-08 RX ADMIN — ENOXAPARIN SODIUM 40 MG: 40 INJECTION SUBCUTANEOUS at 08:35

## 2022-01-08 RX ADMIN — INSULIN LISPRO 1 UNITS: 100 INJECTION, SOLUTION INTRAVENOUS; SUBCUTANEOUS at 17:10

## 2022-01-08 RX ADMIN — INSULIN LISPRO 6 UNITS: 100 INJECTION, SOLUTION INTRAVENOUS; SUBCUTANEOUS at 08:36

## 2022-01-08 ASSESSMENT — PAIN SCALES - WONG BAKER
WONGBAKER_NUMERICALRESPONSE: 0

## 2022-01-08 NOTE — PROGRESS NOTES
Perfect served hospitalist Reddy Foster @ [phone] [facility]. Pt has a home med order of rectal gel diazepam. Pharmacy called and informed me that they do not carry this - all they carry are tablets. Pt is NPO w/ an NG tube. What would you like to do instead? Order for 2mg IVP Ativan ordered PRN for h/o epilepsy.

## 2022-01-08 NOTE — PROGRESS NOTES
At around [phone], I came to check in on pt to get their vitals and check their restraints. Found pt in restraints but R nare NG tube was ripped out. Called charge Bollinger, RN. Inserted a 16 Armenian R nare NG tube with zero resistance yet the L nare had resistance. Ordered a STAT KUB to confirm placement. STAT KUB said to advance NG tube so NG Tube was advanced from 55 cm to 63 cm. NG Bridle was placed as well. NG tube flushed and hooked back up to continuous suction.

## 2022-01-08 NOTE — PROGRESS NOTES
[facility]    PATIENT NAME: Aline Mayerer     TODAY'S DATE: 1/8/2022    CHIEF COMPLAINT: none    INTERVAL HISTORY/HPI:    Pt without bowel movements, ng replace, no fevers or chills. REVIEW OF SYSTEMS:  Pertinent positives and negatives as per interval history section    OBJECTIVE:  VITALS:  /76   Pulse 105   Temp 97.5 °F (36.4 °C) (Axillary)   Resp 18   Ht 5' 9\" (1.753 m)   Wt 126 lb (57.2 kg)   SpO2 100%   BMI 18.61 kg/m²     INTAKE/OUTPUT:    I/O last 3 completed shifts:  In: -   Out: 1300 [Urine:250; Emesis/NG output:1050]  No intake/output data recorded. CONSTITUTIONAL:  awake   LUNGS:  Respirations easy and unlabored  CARD:  tachycardic with regular rhythm  ABDOMEN:  hypoactive bowel sounds, soft, distended, non-tender     Data:  CBC: Recent Labs     01/07/22  1302 01/08/22  0600   WBC 12.1* 8.5   HGB 15.6 14.5   HCT 48.5 44.8    204     BMP:    Recent Labs     01/07/22  1302 01/08/22  0600    147*   K 4.7 3.4*   CL 98* 107   CO2 23 21   BUN 23* 19   CREATININE 1.1 0.9   GLUCOSE 547* 367*     Hepatic:   Recent Labs     01/07/22  1302   AST 26   ALT 22   BILITOT 0.4   ALKPHOS 174*     Mag:      Recent Labs     01/08/22  0600   MG 2.00      Phos:   No results for input(s): PHOS in the last 72 hours. INR: No results for input(s): INR in the last 72 hours. Radiology Review:  *Imaging personally reviewed by me. NA      ASSESSMENT AND PLAN:  41 yo with sbo  1. Continue ng to lcws  2. AXR tomorrow  3.   Lactic acid normalized     Electronically signed by Odette Puente MD     [id number]

## 2022-01-08 NOTE — PROGRESS NOTES
Pt arrived to room 321 around 2045. Received report via telephone from [facility] in the ED. Pt arrived AOX self. Pt is nonverbal with MRDD so some of admission questions were not able to be answered. Pt vitals were stable but the pt had a higher BP - informed JOAQUINA Hanley. Pt shift assessment completed and charted. Pt ABD is very firm and taut, with hypoactive/tinkling bowel sounds. Pt has bilateral wrist restrains checked q2 hours & a right nare NG tube check q2 hours. Pt in a brief and was changed upon admission - will be checked q2 hours. Pt bed is low and locked with bedside table and call light nearby. All care per orders, will continue to monitor.

## 2022-01-08 NOTE — PLAN OF CARE
Problem: Falls - Risk of:  Goal: Will remain free from falls  Description: Will remain free from falls  Outcome: Ongoing  Goal: Absence of physical injury  Description: Absence of physical injury  Outcome: Ongoing  Note: Pt bed alarm is on. Pt's bed is low and locked. Pt has call light within reach and bedside table. Pt in restraints. Pt room close to nursing station. Problem: Non-Violent Restraints  Goal: Removal from restraints as soon as assessed to be safe  Outcome: Ongoing  Goal: No harm/injury to patient while restraints in use  Outcome: Ongoing  Goal: Patient's dignity will be maintained  Outcome: Ongoing  Note: Pt restraints checked q2h. Pulse is palpable. No redness or bruising around restraint site.       Problem: Skin Integrity:  Goal: Will show no infection signs and symptoms  Description: Will show no infection signs and symptoms  Outcome: Ongoing  Goal: Absence of new skin breakdown  Description: Absence of new skin breakdown  Outcome: Ongoing     Jeff Nguyen RN

## 2022-01-08 NOTE — ED NOTES
A (Device Closure Flx Helen 27mm)  Report given to [facility] JOAQUINA Wall RN  01/07/22 2034

## 2022-01-08 NOTE — PROGRESS NOTES
4 Eyes Skin Assessment     The patient is being assess for  Admission    I agree that 2 RN's have performed a thorough Head to Toe Skin Assessment on the patient. ALL assessment sites listed below have been assessed. Areas assessed by both nurses:  [x]   Head, Face, and Ears   [x]   Shoulders, Back, and Chest  [x]   Arms, Elbows, and Hands   [x]   Coccyx, Sacrum, and Ischum  [x]   Legs, Feet, and Heels        Does the Patient have Skin Breakdown?   No         Bridger Prevention initiated:  No   Wound Care Orders initiated:  No      [facility] nurse consulted for Pressure Injury (Stage 3,4, Unstageable, DTI, NWPT, and Complex wounds):  No      Nurse 1 eSignature: Electronically signed by Samra Bautista RN on 1/7/22 at 9:47 PM EST    **SHARE this note so that the co-signing nurse is able to place an eSignature**    Nurse 2 eSignature: Electronically signed by Santi Roland RN on 1/8/22 at 1:54 AM EST

## 2022-01-08 NOTE — PROGRESS NOTES
Hospitalist Progress Note      PCP: Barb Block MD    Date of Admission: 1/7/2022    Chief Complaint: n/v    Hospital Course:     42 y.o. male with hx of dm2, gerd, MRDD who presented to [facility] with n/v and constipation and found to have SBO. gen surg consulted. NG placed    Subjective:  Moving around more today, NG in place, cannot communicate       Medications:  Reviewed    Infusion Medications    sodium chloride      sodium chloride 50 mL/hr at 01/07/22 5743     Scheduled Medications    ketoconazole   Topical Weekly    sodium chloride flush  5-40 mL IntraVENous 2 times per day    enoxaparin  40 mg SubCUTAneous Daily    insulin lispro  0-6 Units SubCUTAneous TID WC    insulin lispro  0-3 Units SubCUTAneous Nightly     PRN Meds: diazePAM, sodium chloride flush, sodium chloride, ondansetron **OR** ondansetron, polyethylene glycol, acetaminophen **OR** acetaminophen, prochlorperazine, morphine, LORazepam      Intake/Output Summary (Last 24 hours) at 1/8/2022 0888  Last data filed at 1/8/2022 0519  Gross per 24 hour   Intake    Output 1300 ml   Net -1300 ml       Physical Exam Performed:    /76   Pulse 105   Temp 97.5 °F (36.4 °C) (Axillary)   Resp 18   Ht 5' 9\" (1.753 m)   Wt 126 lb (57.2 kg)   SpO2 100%   BMI 18.61 kg/m²     General appearance:  No apparent distress, appears stated age and cooperative. HEENT:  Normal cephalic, atraumatic without obvious deformity. Pupils equal, round, and reactive to light. Extra ocular muscles intact. Conjunctivae/corneas clear. NG in placed  Neck: Supple, with full range of motion. No jugular venous distention. Trachea midline. Respiratory:  Normal respiratory effort. Clear to auscultation, bilaterally without Rales/Wheezes/Rhonchi. Cardiovascular:  Regular rate and rhythm with normal S1/S2 without murmurs, rubs or gallops. Abdomen: Soft, non-tender, less distended with more active bowel sounds.   Musculoskeletal:  No clubbing, cyanosis or edema bilaterally. Full range of motion without deformity. Skin: Skin color, texture, turgor normal.  No rashes or lesions. Neurologic:  cannot test given MRDD  Psychiatric:  Alert and oriented x0, awake and opens eyes, MRDD  Capillary Refill: Brisk,3 seconds, normal  Peripheral Pulses: +2 palpable, equal bilaterally          Labs:   Recent Labs     01/07/22  1302 01/08/22  0600   WBC 12.1* 8.5   HGB 15.6 14.5   HCT 48.5 44.8    204     Recent Labs     01/07/22  1302 01/08/22  0600    147*   K 4.7 3.4*   CL 98* 107   CO2 23 21   BUN 23* 19   CREATININE 1.1 0.9   CALCIUM 8.8 8.6     Recent Labs     01/07/22  1302   AST 26   ALT 22   BILITOT 0.4   ALKPHOS 174*     No results for input(s): INR in the last 72 hours. No results for input(s): Myrla Greulich in the last 72 hours. Urinalysis:      Lab Results   Component Value Date    NITRU Negative 12/10/2018    WBCUA 3-5 12/10/2018    BACTERIA Rare 12/10/2018    RBCUA 0-2 12/10/2018    BLOODU Negative 12/10/2018    SPECGRAV 1.020 12/10/2018    GLUCOSEU Negative 12/10/2018    GLUCOSEU >=1000 04/25/2012       Radiology:  XR ABDOMEN (KUB) (SINGLE AP VIEW)   Final Result   Gastric tube tip just within the body of the stomach with the side port seen   in the distal esophagus. Recommend readjusting the tube tip into the distal   antrum of the stomach for more physiologic positioning. Probable distal partial small bowel obstruction which is unchanged. XR ABDOMEN (KUB) (SINGLE AP VIEW)   Final Result      1. NG tube in place extending into the mid stomach. 2.  Patchy interstitial opacity in both mid lower lung fields is exaggerated   by the poor level of inspiration though suggests pneumonitis or at least   atelectasis and similar to the prior study. XR ABDOMEN (KUB) (SINGLE AP VIEW)   Final Result   Status post gastric tube placement with the tip along the body of the   stomach. The side port appears near the gastric wall. Suggest readjusting   the gastric tube tip into distal stomach for more physiologic positioning. Slight decrease in the amount gastric and bowel dilatation         XR ABDOMEN (2 VIEWS)   Final Result   Findings suspicious for a distal partial small bowel obstruction which may be   slightly less prominent from the prior CT scan. Mild constipation with no obvious colonic dilatation. CT ABDOMEN PELVIS W IV CONTRAST Additional Contrast? None   Final Result   Abnormally dilated stomach and proximal small bowel loops with relative   collapse of distal small bowel loops. , Compatible with small bowel   obstruction. There is a subtle swirled appearance to the mesentery as seen on   image number 98 with a questionable internal hernia on image number 112 in   the midline.   Reactive fluid is seen in the pelvis      RECOMMENDATIONS:   Unavailable                 Assessment/Plan:    Active Hospital Problems    Diagnosis     Small bowel obstruction ([facility] [address] [Y44.659]        N/v/constipation- due to SBO, noted on CTa/p(abnormally dilated stomach and prox small bowel loops with relative collapse of distal small bowel loops)  -NG ordered in ER  -Gen surg consulted, apprec mgmt  -kept NPO  -Tele x 72hrs  -prn iv morphine for pain  -gentle ivfs started(50/hr x 15 hrs)     Hypernatremia- likely from volume depletion  -trial gentle ivfs with free water again on 1/8  -monitored labs    DM2- per emr  Check a1c  q6h bs  -low ssi  -Held neurontin, asa  -held home lantus      Hypothyroid  -Held synthroid     GERD- -Held home po ppi     Seizure d/o- appears stable  -placed in seizure precautions, tele  -Held carbamazepine until taking PO  -Held zonisamide  -Held Lamotrigine  -continued home rectal diazepam prn, used iv ativan prn instead while in hospital     MRDD- mgmt as outpt     Depression- held zoloft     DVT Prophylaxis: lovenox  Diet: Diet NPO  Code Status: Full Code      PT/OT Eval Status: not ordered     Dispo - pending improvement, resume home meds when able    Storm Plummer MD

## 2022-01-09 ENCOUNTER — APPOINTMENT (OUTPATIENT)
Dept: GENERAL RADIOLOGY | Age: 47
DRG: 247 | End: 2022-01-09
Payer: MEDICAID

## 2022-01-09 LAB
ALBUMIN SERPL-MCNC: 3.3 G/DL (ref 3.4–5)
ANION GAP SERPL CALCULATED.3IONS-SCNC: 14 MMOL/L (ref 3–16)
BUN BLDV-MCNC: 16 MG/DL (ref 7–20)
CALCIUM SERPL-MCNC: 8.3 MG/DL (ref 8.3–10.6)
CHLORIDE BLD-SCNC: 107 MMOL/L (ref 99–110)
CO2: 24 MMOL/L (ref 21–32)
CREAT SERPL-MCNC: 0.7 MG/DL (ref 0.9–1.3)
GFR AFRICAN AMERICAN: >60
GFR NON-AFRICAN AMERICAN: >60
GLUCOSE BLD-MCNC: 184 MG/DL (ref 70–99)
GLUCOSE BLD-MCNC: 214 MG/DL (ref 70–99)
GLUCOSE BLD-MCNC: 222 MG/DL (ref 70–99)
GLUCOSE BLD-MCNC: 243 MG/DL (ref 70–99)
GLUCOSE BLD-MCNC: 285 MG/DL (ref 70–99)
GLUCOSE BLD-MCNC: 325 MG/DL (ref 70–99)
MAGNESIUM: 1.9 MG/DL (ref 1.8–2.4)
PERFORMED ON: ABNORMAL
PHOSPHORUS: 1.8 MG/DL (ref 2.5–4.9)
POTASSIUM SERPL-SCNC: 2.8 MMOL/L (ref 3.5–5.1)
SODIUM BLD-SCNC: 145 MMOL/L (ref 136–145)

## 2022-01-09 PROCEDURE — 83735 ASSAY OF MAGNESIUM: CPT

## 2022-01-09 PROCEDURE — 36415 COLL VENOUS BLD VENIPUNCTURE: CPT

## 2022-01-09 PROCEDURE — 1200000000 HC SEMI PRIVATE

## 2022-01-09 PROCEDURE — 99232 SBSQ HOSP IP/OBS MODERATE 35: CPT | Performed by: SURGERY

## 2022-01-09 PROCEDURE — 2580000003 HC RX 258: Performed by: INTERNAL MEDICINE

## 2022-01-09 PROCEDURE — 80069 RENAL FUNCTION PANEL: CPT

## 2022-01-09 PROCEDURE — 6370000000 HC RX 637 (ALT 250 FOR IP): Performed by: INTERNAL MEDICINE

## 2022-01-09 PROCEDURE — 74022 RADEX COMPL AQT ABD SERIES: CPT

## 2022-01-09 PROCEDURE — 6360000002 HC RX W HCPCS: Performed by: INTERNAL MEDICINE

## 2022-01-09 RX ORDER — INSULIN GLARGINE 100 [IU]/ML
5 INJECTION, SOLUTION SUBCUTANEOUS EVERY MORNING
Status: DISCONTINUED | OUTPATIENT
Start: 2022-01-09 | End: 2022-01-11

## 2022-01-09 RX ORDER — POTASSIUM CHLORIDE 7.45 MG/ML
10 INJECTION INTRAVENOUS ONCE
Status: COMPLETED | OUTPATIENT
Start: 2022-01-09 | End: 2022-01-09

## 2022-01-09 RX ORDER — MAGNESIUM SULFATE 1 G/100ML
1000 INJECTION INTRAVENOUS ONCE
Status: COMPLETED | OUTPATIENT
Start: 2022-01-09 | End: 2022-01-09

## 2022-01-09 RX ADMIN — ENOXAPARIN SODIUM 40 MG: 40 INJECTION SUBCUTANEOUS at 07:56

## 2022-01-09 RX ADMIN — INSULIN GLARGINE 5 UNITS: 100 INJECTION, SOLUTION SUBCUTANEOUS at 13:58

## 2022-01-09 RX ADMIN — MAGNESIUM SULFATE HEPTAHYDRATE 1000 MG: 1 INJECTION, SOLUTION INTRAVENOUS at 14:56

## 2022-01-09 RX ADMIN — POTASSIUM CHLORIDE 10 MEQ: 7.46 INJECTION, SOLUTION INTRAVENOUS at 21:58

## 2022-01-09 RX ADMIN — INSULIN LISPRO 2 UNITS: 100 INJECTION, SOLUTION INTRAVENOUS; SUBCUTANEOUS at 11:41

## 2022-01-09 RX ADMIN — INSULIN LISPRO 1 UNITS: 100 INJECTION, SOLUTION INTRAVENOUS; SUBCUTANEOUS at 21:59

## 2022-01-09 RX ADMIN — POTASSIUM CHLORIDE 10 MEQ: 7.46 INJECTION, SOLUTION INTRAVENOUS at 14:57

## 2022-01-09 RX ADMIN — INSULIN LISPRO 3 UNITS: 100 INJECTION, SOLUTION INTRAVENOUS; SUBCUTANEOUS at 07:57

## 2022-01-09 RX ADMIN — DEXTROSE AND SODIUM CHLORIDE: 5; 450 INJECTION, SOLUTION INTRAVENOUS at 07:56

## 2022-01-09 RX ADMIN — INSULIN LISPRO 2 UNITS: 100 INJECTION, SOLUTION INTRAVENOUS; SUBCUTANEOUS at 17:54

## 2022-01-09 NOTE — PROGRESS NOTES
Pt is alert and nonverbal. VSS. RA. Pt resting comfortably in bed. Pt in bilateral wrist restraints at this time for pulling at lines (NG and IV). NG to CLWS. Green/yellow output. Shift assessment completed and documented. Call light within reach. Bed side table within reach. Wheels locked. Bed in lowest position. Bed check in place. All care per orders.  . Electronically signed by Saulo Braden RN on 1/8/2022 at 10:20 PM

## 2022-01-09 NOTE — PROGRESS NOTES
Hospitalist Progress Note      PCP: Sarmad Laureano MD    Date of Admission: 1/7/2022      Chief Complaint: n/v     Hospital Course:     42 y. o. male with hx of dm2, gerd, MRDD who presented to [facility] with n/v and constipation and found to have SBO. gen surg consulted. NG placed     Subjective:  Moving around when stimulated, NG remains in place with ongoing outpt noted, cannot communicate       Medications:  Reviewed    Infusion Medications    dextrose      sodium chloride       Scheduled Medications    ketoconazole   Topical Weekly    sodium chloride flush  5-40 mL IntraVENous 2 times per day    enoxaparin  40 mg SubCUTAneous Daily    insulin lispro  0-6 Units SubCUTAneous TID WC    insulin lispro  0-3 Units SubCUTAneous Nightly     PRN Meds: glucose, dextrose, glucagon (rDNA), dextrose, diazePAM, sodium chloride flush, sodium chloride, ondansetron **OR** ondansetron, polyethylene glycol, acetaminophen **OR** acetaminophen, prochlorperazine, morphine, LORazepam      Intake/Output Summary (Last 24 hours) at 1/9/2022 0735  Last data filed at 1/8/2022 1529  Gross per 24 hour   Intake 799.35 ml   Output 600 ml   Net 199.35 ml       Physical Exam Performed:    /79   Pulse 102   Temp 98.1 °F (36.7 °C) (Axillary)   Resp 16   Ht 5' 9\" (1.753 m)   Wt 126 lb (57.2 kg)   SpO2 95%   BMI 18.61 kg/m²       General appearance:  No apparent distress, appears stated age and cooperative. HEENT:  Normal cephalic, atraumatic without obvious deformity. Pupils equal, round, and reactive to light.  Extra ocular muscles intact. Conjunctivae/corneas clear. NG in placed  Neck: Supple, with full range of motion. No jugular venous distention. Trachea midline. Respiratory:  Normal respiratory effort. Clear to auscultation, bilaterally without Rales/Wheezes/Rhonchi. Cardiovascular:  Regular rate and rhythm with normal S1/S2 without murmurs, rubs or gallops.   Abdomen: Soft, non-tender, less distended with less active bowel sounds today. Musculoskeletal:  No clubbing, cyanosis or edema bilaterally.  Full range of motion without deformity. Skin: Skin color, texture, turgor normal.  No rashes or lesions. Neurologic:  cannot test given MRDD  Psychiatric:  Alert and oriented x0, awake and opens eyes, MRDD  Capillary Refill: Brisk,3 seconds, normal  Peripheral Pulses: +2 palpable, equal bilaterally          Labs:   Recent Labs     01/07/22  1302 01/08/22  0600   WBC 12.1* 8.5   HGB 15.6 14.5   HCT 48.5 44.8    204     Recent Labs     01/07/22  1302 01/08/22  0600 01/08/22  1912    147* 145   K 4.7 3.4* 3.4*   CL 98* 107 108   CO2 23 21 23   BUN 23* 19 18   CREATININE 1.1 0.9 0.7*   CALCIUM 8.8 8.6 8.3     Recent Labs     01/07/22  1302   AST 26   ALT 22   BILITOT 0.4   ALKPHOS 174*     No results for input(s): INR in the last 72 hours. No results for input(s): Alejandra Jeff in the last 72 hours. Urinalysis:      Lab Results   Component Value Date    NITRU Negative 12/10/2018    WBCUA 3-5 12/10/2018    BACTERIA Rare 12/10/2018    RBCUA 0-2 12/10/2018    BLOODU Negative 12/10/2018    SPECGRAV 1.020 12/10/2018    GLUCOSEU Negative 12/10/2018    GLUCOSEU >=1000 04/25/2012       Radiology:  XR ABDOMEN (KUB) (SINGLE AP VIEW)   Final Result   Gastric tube tip just within the body of the stomach with the side port seen   in the distal esophagus. Recommend readjusting the tube tip into the distal   antrum of the stomach for more physiologic positioning. Probable distal partial small bowel obstruction which is unchanged. XR ABDOMEN (KUB) (SINGLE AP VIEW)   Final Result      1. NG tube in place extending into the mid stomach. 2.  Patchy interstitial opacity in both mid lower lung fields is exaggerated   by the poor level of inspiration though suggests pneumonitis or at least   atelectasis and similar to the prior study.          XR ABDOMEN (KUB) (SINGLE AP VIEW)   Final Result   Status post gastric tube placement with the tip along the body of the   stomach. The side port appears near the gastric wall. Suggest readjusting   the gastric tube tip into distal stomach for more physiologic positioning. Slight decrease in the amount gastric and bowel dilatation         XR ABDOMEN (2 VIEWS)   Final Result   Findings suspicious for a distal partial small bowel obstruction which may be   slightly less prominent from the prior CT scan. Mild constipation with no obvious colonic dilatation. CT ABDOMEN PELVIS W IV CONTRAST Additional Contrast? None   Final Result   Abnormally dilated stomach and proximal small bowel loops with relative   collapse of distal small bowel loops. , Compatible with small bowel   obstruction. There is a subtle swirled appearance to the mesentery as seen on   image number 98 with a questionable internal hernia on image number 112 in   the midline.   Reactive fluid is seen in the pelvis      RECOMMENDATIONS:   Unavailable         XR ABDOMEN (2 VIEWS)    (Results Pending)           Assessment/Plan:    Active Hospital Problems    Diagnosis     Small bowel obstruction ([facility] [address] [K56.609]             N/v/constipation- due to SBO, noted on CTa/p(abnormally dilated stomach and prox small bowel loops with relative collapse of distal small bowel loops)  -NG ordered in ER  -Gen surg consulted, apprec mgmt  -kept NPO  -Tele x 72hrs  -prn iv morphine for pain  -gentle ivfs started(50/hr x 15 hrs)     Hypernatremia- likely from volume depletion  -trialed gentle ivfs with free water again on 1/8  -monitored labs     DM2- per emr  Check a1c  q6h bs  -low ssi  -Held neurontin, asa  -held home lantus , restarted at lower dose 1/9     Hypothyroid  -Held synthroid     GERD- -Held home po ppi     Seizure d/o- appears stable  -placed in seizure precautions, tele  -Held carbamazepine until taking PO  -Held zonisamide  -Held Lamotrigine  -continued home rectal diazepam prn, used iv ativan prn instead while in hospital     MRDD- mgmt as outpt     Depression- held zoloft     DVT Prophylaxis: lovenox  Diet: Diet NPO  Code Status: Full Code         PT/OT Eval Status: not ordered     Dispo - pending improvement/surg recs, resume home seizure meds when able, monitor sodium/continue ivfs, restart low dose lantus    Eliza Carmona MD

## 2022-01-09 NOTE — PROGRESS NOTES
Patient in bed awake. Alert nonverbal. Assessment completed and charted. Respirations even and unlabored. No indications of pain. Bed in lowest position and locked. Seizure precautions in place. Avasys on. Soft bilateral wrist restraints. No injury noted. Secure message sent to Dr. Tomi Isbell regarding potassium level of 2.8.

## 2022-01-09 NOTE — PLAN OF CARE
Problem: Falls - Risk of:  Goal: Will remain free from falls  Description: Will remain free from falls  Outcome: Ongoing     Problem: Falls - Risk of:  Goal: Absence of physical injury  Description: Absence of physical injury  Outcome: Ongoing     Problem: Non-Violent Restraints  Goal: Removal from restraints as soon as assessed to be safe  Outcome: Ongoing     Problem: Non-Violent Restraints  Goal: No harm/injury to patient while restraints in use  Outcome: Ongoing     Problem: Non-Violent Restraints  Goal: Patient's dignity will be maintained  Outcome: Ongoing

## 2022-01-09 NOTE — PROGRESS NOTES
[facility] SURGERY    PATIENT NAME: Bhavana Sharma     TODAY'S DATE: 1/9/2022    CHIEF COMPLAINT: none    INTERVAL HISTORY/HPI:    Pt without bowel movements, ng in place, no fevers or chills. REVIEW OF SYSTEMS:  Pertinent positives and negatives as per interval history section    OBJECTIVE:  VITALS:  BP (!) 140/79   Pulse 101   Temp 98 °F (36.7 °C) (Axillary)   Resp 17   Ht 5' 9\" (1.753 m)   Wt 126 lb (57.2 kg)   SpO2 94%   BMI 18.61 kg/m²     INTAKE/OUTPUT:    I/O last 3 completed shifts: In: 799.4 [I.V.:799.4]  Out: 1900 [Urine:400; Emesis/NG output:1500]  No intake/output data recorded. CONSTITUTIONAL:  awake   LUNGS:  Respirations easy and unlabored  CARD:  tachycardic with regular rhythm  ABDOMEN:  hypoactive bowel sounds, soft, distended, non-tender     Data:  CBC:   Recent Labs     01/07/22  1302 01/08/22  0600   WBC 12.1* 8.5   HGB 15.6 14.5   HCT 48.5 44.8    204     BMP:    Recent Labs     01/07/22  1302 01/08/22  0600 01/08/22  1912    147* 145   K 4.7 3.4* 3.4*   CL 98* 107 108   CO2 23 21 23   BUN 23* 19 18   CREATININE 1.1 0.9 0.7*   GLUCOSE 547* 367* 198*     Hepatic:   Recent Labs     01/07/22  1302   AST 26   ALT 22   BILITOT 0.4   ALKPHOS 174*     Mag:      Recent Labs     01/08/22  0600   MG 2.00      Phos:   No results for input(s): PHOS in the last 72 hours. INR: No results for input(s): INR in the last 72 hours. Radiology Review:  *Imaging personally reviewed by me. AXR - dilated small bowel      ASSESSMENT AND PLAN:  41 yo with sbo  1. Continue ng to lcws  2.   If no further improvement tomorrow will get SBFT to see if OR is needed     Electronically signed by Axel Bryant MD     [id number]

## 2022-01-10 ENCOUNTER — APPOINTMENT (OUTPATIENT)
Dept: GENERAL RADIOLOGY | Age: 47
DRG: 247 | End: 2022-01-10
Payer: MEDICAID

## 2022-01-10 LAB
ANION GAP SERPL CALCULATED.3IONS-SCNC: 10 MMOL/L (ref 3–16)
ANION GAP SERPL CALCULATED.3IONS-SCNC: 13 MMOL/L (ref 3–16)
ANION GAP SERPL CALCULATED.3IONS-SCNC: 7 MMOL/L (ref 3–16)
BASOPHILS ABSOLUTE: 0 K/UL (ref 0–0.2)
BASOPHILS RELATIVE PERCENT: 0.3 %
BUN BLDV-MCNC: 12 MG/DL (ref 7–20)
BUN BLDV-MCNC: 13 MG/DL (ref 7–20)
BUN BLDV-MCNC: 14 MG/DL (ref 7–20)
CALCIUM SERPL-MCNC: 8.5 MG/DL (ref 8.3–10.6)
CALCIUM SERPL-MCNC: 8.6 MG/DL (ref 8.3–10.6)
CALCIUM SERPL-MCNC: 9.3 MG/DL (ref 8.3–10.6)
CHLORIDE BLD-SCNC: 104 MMOL/L (ref 99–110)
CHLORIDE BLD-SCNC: 108 MMOL/L (ref 99–110)
CHLORIDE BLD-SCNC: 110 MMOL/L (ref 99–110)
CO2: 25 MMOL/L (ref 21–32)
CO2: 27 MMOL/L (ref 21–32)
CO2: 29 MMOL/L (ref 21–32)
CREAT SERPL-MCNC: 0.6 MG/DL (ref 0.9–1.3)
CREAT SERPL-MCNC: 0.6 MG/DL (ref 0.9–1.3)
CREAT SERPL-MCNC: 0.7 MG/DL (ref 0.9–1.3)
EOSINOPHILS ABSOLUTE: 0.1 K/UL (ref 0–0.6)
EOSINOPHILS RELATIVE PERCENT: 0.9 %
GFR AFRICAN AMERICAN: >60
GFR NON-AFRICAN AMERICAN: >60
GLUCOSE BLD-MCNC: 166 MG/DL (ref 70–99)
GLUCOSE BLD-MCNC: 185 MG/DL (ref 70–99)
GLUCOSE BLD-MCNC: 193 MG/DL (ref 70–99)
GLUCOSE BLD-MCNC: 200 MG/DL (ref 70–99)
GLUCOSE BLD-MCNC: 205 MG/DL (ref 70–99)
GLUCOSE BLD-MCNC: 205 MG/DL (ref 70–99)
GLUCOSE BLD-MCNC: 225 MG/DL (ref 70–99)
GLUCOSE BLD-MCNC: 236 MG/DL (ref 70–99)
GLUCOSE BLD-MCNC: 257 MG/DL (ref 70–99)
HCT VFR BLD CALC: 43.1 % (ref 40.5–52.5)
HEMOGLOBIN: 14.2 G/DL (ref 13.5–17.5)
LYMPHOCYTES ABSOLUTE: 1.5 K/UL (ref 1–5.1)
LYMPHOCYTES RELATIVE PERCENT: 22 %
MAGNESIUM: 2.1 MG/DL (ref 1.8–2.4)
MAGNESIUM: 2.3 MG/DL (ref 1.8–2.4)
MCH RBC QN AUTO: 29.1 PG (ref 26–34)
MCHC RBC AUTO-ENTMCNC: 33 G/DL (ref 31–36)
MCV RBC AUTO: 88.1 FL (ref 80–100)
MONOCYTES ABSOLUTE: 0.5 K/UL (ref 0–1.3)
MONOCYTES RELATIVE PERCENT: 8 %
NEUTROPHILS ABSOLUTE: 4.7 K/UL (ref 1.7–7.7)
NEUTROPHILS RELATIVE PERCENT: 68.8 %
PDW BLD-RTO: 14.3 % (ref 12.4–15.4)
PERFORMED ON: ABNORMAL
PLATELET # BLD: 223 K/UL (ref 135–450)
PMV BLD AUTO: 7.8 FL (ref 5–10.5)
POTASSIUM REFLEX MAGNESIUM: 3.2 MMOL/L (ref 3.5–5.1)
POTASSIUM REFLEX MAGNESIUM: 3.3 MMOL/L (ref 3.5–5.1)
POTASSIUM SERPL-SCNC: 3.2 MMOL/L (ref 3.5–5.1)
RBC # BLD: 4.89 M/UL (ref 4.2–5.9)
SODIUM BLD-SCNC: 143 MMOL/L (ref 136–145)
SODIUM BLD-SCNC: 144 MMOL/L (ref 136–145)
SODIUM BLD-SCNC: 146 MMOL/L (ref 136–145)
WBC # BLD: 6.8 K/UL (ref 4–11)

## 2022-01-10 PROCEDURE — 2580000003 HC RX 258: Performed by: INTERNAL MEDICINE

## 2022-01-10 PROCEDURE — 6360000002 HC RX W HCPCS: Performed by: NURSE PRACTITIONER

## 2022-01-10 PROCEDURE — 1200000000 HC SEMI PRIVATE

## 2022-01-10 PROCEDURE — 6370000000 HC RX 637 (ALT 250 FOR IP): Performed by: INTERNAL MEDICINE

## 2022-01-10 PROCEDURE — 6360000002 HC RX W HCPCS: Performed by: INTERNAL MEDICINE

## 2022-01-10 PROCEDURE — 83036 HEMOGLOBIN GLYCOSYLATED A1C: CPT

## 2022-01-10 PROCEDURE — 74250 X-RAY XM SM INT 1CNTRST STD: CPT

## 2022-01-10 PROCEDURE — 83735 ASSAY OF MAGNESIUM: CPT

## 2022-01-10 PROCEDURE — 80048 BASIC METABOLIC PNL TOTAL CA: CPT

## 2022-01-10 PROCEDURE — 36415 COLL VENOUS BLD VENIPUNCTURE: CPT

## 2022-01-10 PROCEDURE — 85025 COMPLETE CBC W/AUTO DIFF WBC: CPT

## 2022-01-10 PROCEDURE — 99232 SBSQ HOSP IP/OBS MODERATE 35: CPT | Performed by: SURGERY

## 2022-01-10 PROCEDURE — 6360000004 HC RX CONTRAST MEDICATION: Performed by: SURGERY

## 2022-01-10 PROCEDURE — 2500000003 HC RX 250 WO HCPCS: Performed by: INTERNAL MEDICINE

## 2022-01-10 RX ORDER — ZONISAMIDE 100 MG/1
400 CAPSULE ORAL DAILY
Status: DISCONTINUED | OUTPATIENT
Start: 2022-01-10 | End: 2022-01-13 | Stop reason: HOSPADM

## 2022-01-10 RX ORDER — POTASSIUM CHLORIDE 7.45 MG/ML
10 INJECTION INTRAVENOUS
Status: COMPLETED | OUTPATIENT
Start: 2022-01-10 | End: 2022-01-10

## 2022-01-10 RX ORDER — LAMOTRIGINE 100 MG/1
400 TABLET ORAL EVERY MORNING
Status: DISCONTINUED | OUTPATIENT
Start: 2022-01-10 | End: 2022-01-13 | Stop reason: HOSPADM

## 2022-01-10 RX ORDER — DEXTROSE, SODIUM CHLORIDE, AND POTASSIUM CHLORIDE 5; .45; .15 G/100ML; G/100ML; G/100ML
INJECTION INTRAVENOUS CONTINUOUS
Status: DISCONTINUED | OUTPATIENT
Start: 2022-01-10 | End: 2022-01-11

## 2022-01-10 RX ORDER — LANOLIN ALCOHOL/MO/W.PET/CERES
3 CREAM (GRAM) TOPICAL NIGHTLY
Status: DISCONTINUED | OUTPATIENT
Start: 2022-01-10 | End: 2022-01-13 | Stop reason: HOSPADM

## 2022-01-10 RX ORDER — GABAPENTIN 300 MG/1
300 CAPSULE ORAL 4 TIMES DAILY
Status: DISCONTINUED | OUTPATIENT
Start: 2022-01-10 | End: 2022-01-13 | Stop reason: HOSPADM

## 2022-01-10 RX ORDER — CARBAMAZEPINE 100 MG/1
400 TABLET, EXTENDED RELEASE ORAL EVERY 12 HOURS
Status: DISCONTINUED | OUTPATIENT
Start: 2022-01-10 | End: 2022-01-13 | Stop reason: HOSPADM

## 2022-01-10 RX ADMIN — GABAPENTIN 300 MG: 300 CAPSULE ORAL at 21:25

## 2022-01-10 RX ADMIN — Medication 3 MG: at 21:27

## 2022-01-10 RX ADMIN — ENOXAPARIN SODIUM 40 MG: 40 INJECTION SUBCUTANEOUS at 10:26

## 2022-01-10 RX ADMIN — POTASSIUM CHLORIDE, DEXTROSE MONOHYDRATE AND SODIUM CHLORIDE: 150; 5; 450 INJECTION, SOLUTION INTRAVENOUS at 21:15

## 2022-01-10 RX ADMIN — Medication 10 ML: at 09:17

## 2022-01-10 RX ADMIN — LAMOTRIGINE 450 MG: 25 TABLET ORAL at 21:25

## 2022-01-10 RX ADMIN — INSULIN LISPRO 3 UNITS: 100 INJECTION, SOLUTION INTRAVENOUS; SUBCUTANEOUS at 16:35

## 2022-01-10 RX ADMIN — INSULIN LISPRO 2 UNITS: 100 INJECTION, SOLUTION INTRAVENOUS; SUBCUTANEOUS at 09:17

## 2022-01-10 RX ADMIN — POTASSIUM CHLORIDE, DEXTROSE MONOHYDRATE AND SODIUM CHLORIDE: 150; 5; 450 INJECTION, SOLUTION INTRAVENOUS at 10:26

## 2022-01-10 RX ADMIN — INSULIN LISPRO 2 UNITS: 100 INJECTION, SOLUTION INTRAVENOUS; SUBCUTANEOUS at 11:28

## 2022-01-10 RX ADMIN — INSULIN GLARGINE 5 UNITS: 100 INJECTION, SOLUTION SUBCUTANEOUS at 09:17

## 2022-01-10 RX ADMIN — DIATRIZOATE MEGLUMINE AND DIATRIZOATE SODIUM 240 ML: 660; 100 LIQUID ORAL; RECTAL at 11:15

## 2022-01-10 RX ADMIN — Medication 10 ML: at 21:34

## 2022-01-10 RX ADMIN — POTASSIUM CHLORIDE 10 MEQ: 7.46 INJECTION, SOLUTION INTRAVENOUS at 03:18

## 2022-01-10 RX ADMIN — CARBAMAZEPINE 400 MG: 100 TABLET, EXTENDED RELEASE ORAL at 21:27

## 2022-01-10 RX ADMIN — POTASSIUM CHLORIDE 10 MEQ: 7.46 INJECTION, SOLUTION INTRAVENOUS at 05:10

## 2022-01-10 RX ADMIN — INSULIN LISPRO 1 UNITS: 100 INJECTION, SOLUTION INTRAVENOUS; SUBCUTANEOUS at 21:38

## 2022-01-10 RX ADMIN — SERTRALINE 50 MG: 50 TABLET, FILM COATED ORAL at 21:27

## 2022-01-10 NOTE — PROGRESS NOTES
Let Dr. Jeanmarie Katz and Winter Haskins know that XR said they are unable to do SBFT as long as he is in restraints. They said they will talk with XR to work something out.

## 2022-01-10 NOTE — CARE COORDINATION
CASE MANAGEMENT INITIAL ASSESSMENT      Reviewed chart and completed assessment with patient:mother, Ananda Weiss via phone and supervison at [address] [place], [phone]  Explained Case Management role/services. Primary contact [address] :   Primary Decision Maker: [phone] [phone]    Secondary Decision Maker: Nela Mchughs - Aunt/Uncle - [phone]          Can this person be reached and be able to respond quickly, such as within a few minutes or hours? Yes    Admit date/status:1/7/22  Diagnosis:SBO   Is this a Readmission?:  No      Insurance:medicaid   Precert required for SNF: Yes       3 night stay required: No    Living arrangements, Adls, care needs, prior to admission:lives in [facility], is a \"rescare\" kind of group home. Has two other residents. Transportation:[facility] [address] at home:  Walker__Cane__RTS__ BSC__Shower Chair__  02__ HHN__ CPAP__  BiPap__  Hospital Bed__ W/C___ Other__________    Services in the home and/or outpatient, prior to admission:assistance with medication, dressing. Meal prep, patient can feed himself with weighted cup. PT/OT recs:none    Hospital Exemption Notification (HEN):needed for SNF    Barriers to discharge:none    Plan/comments:spoke with patients legal guardian/mother main. Reported patient lives in group home type environment. Has been there for decades. Stated at baseline, patient has a pureed honey thick diet and can feed himself. Needs assistance with walking and dressing. Is [place], legally blind and nonverbal. Has his own private bedroom and bath. Per mom, plan will be to return to group home when medically ready. Reported [place] is , [phone]. Spoke with [place],  stated facility has contract with [facility]. Has a [employer] that can provide transportation home.  Works with [facility] in [place], will need paper scripts at d/c so they will be able to get locally. Currently patient with NGT and in restraints. Pending imaging, may need surgical intervention. Referral to [facility]. Reviewing.  Pallavi Juárez RN      ECOC on chart for MD signature

## 2022-01-10 NOTE — PROGRESS NOTES
Unclamped NGT after talking to XR and making sure SBFT is complete. ABD distended. BS hypoactive. Pt mostly slept all day. He will wake up to voice but goes back to sleep. Provided mouth moistener and lip balm about 4 times today. Pt stable and denied needs. Updated mom and group home.

## 2022-01-10 NOTE — PROGRESS NOTES
Hospitalist Progress Note      PCP: Frank Chaidez MD    Date of Admission: 1/7/2022    Chief Complaint: n/v        Subjective:  He is lying in bed with his eyes open. Not grimacing, calm. Medications:  Reviewed    Infusion Medications    dextrose 5% and 0.45% NaCl with KCl 20 mEq      dextrose      sodium chloride       Scheduled Medications    insulin glargine  5 Units SubCUTAneous QAM    ketoconazole   Topical Weekly    sodium chloride flush  5-40 mL IntraVENous 2 times per day    enoxaparin  40 mg SubCUTAneous Daily    insulin lispro  0-6 Units SubCUTAneous TID WC    insulin lispro  0-3 Units SubCUTAneous Nightly     PRN Meds: glucose, dextrose, glucagon (rDNA), dextrose, diazePAM, sodium chloride flush, sodium chloride, ondansetron **OR** ondansetron, polyethylene glycol, acetaminophen **OR** acetaminophen, prochlorperazine, morphine, LORazepam      Intake/Output Summary (Last 24 hours) at 1/10/2022 1014  Last data filed at 1/10/2022 0655  Gross per 24 hour   Intake    Output 1500 ml   Net -1500 ml       Physical Exam Performed:    BP (!) 157/93   Pulse 97   Temp 97.8 °F (36.6 °C) (Axillary)   Resp 16   Ht 5' 9\" (1.753 m)   Wt 126 lb (57.2 kg)   SpO2 96%   BMI 18.61 kg/m²       General appearance:  No apparent distress, appears stated age and cooperative. HEENT:  Normal cephalic, atraumatic without obvious deformity. Pupils equal, round, and reactive to light.  Extra ocular muscles intact. Conjunctivae/corneas clear. NG in place  Neck: Supple, with full range of motion. No jugular venous distention. Trachea midline. Respiratory:  Normal respiratory effort. Clear to auscultation, bilaterally without Rales/Wheezes/Rhonchi. Cardiovascular:  Regular rate and rhythm with normal S1/S2 without murmurs, rubs or gallops. Abdomen: Soft, non-tender, not distended with infrequent bowel sounds.   Musculoskeletal:  No clubbing, cyanosis or edema bilaterally.  Full range of motion without deformity. Skin: Skin color, texture, turgor normal.  No rashes or lesions. Neurologic:  cannot test given MRDD  Psychiatric:  alert, unable to communicate, does not follow commands  Capillary Refill: Brisk,3 seconds, normal  Peripheral Pulses: +2 palpable, equal bilaterally          Labs:   Recent Labs     01/07/22  1302 01/08/22  0600 01/10/22  0717   WBC 12.1* 8.5 6.8   HGB 15.6 14.5 14.2   HCT 48.5 44.8 43.1    204 223     Recent Labs     01/09/22  1325 01/10/22  0014 01/10/22  0717    144 143   K 2.8* 3.2* 3.3*    110 108   CO2 24 27 25   BUN 16 12 13   CREATININE 0.7* 0.7* 0.6*   CALCIUM 8.3 8.5 8.6   PHOS 1.8*  --   --      Recent Labs     01/07/22  1302   AST 26   ALT 22   BILITOT 0.4   ALKPHOS 174*     No results for input(s): INR in the last 72 hours. No results for input(s): Burnice [place] in the last 72 hours. Urinalysis:      Lab Results   Component Value Date    NITRU Negative 12/10/2018    WBCUA 3-5 12/10/2018    BACTERIA Rare 12/10/2018    RBCUA 0-2 12/10/2018    BLOODU Negative 12/10/2018    SPECGRAV 1.020 12/10/2018    GLUCOSEU Negative 12/10/2018    GLUCOSEU >=1000 04/25/2012       Radiology:  XR ACUTE ABD SERIES CHEST 1 VW   Final Result   1. Persistent marked small bowel distension consistent with a high-grade   small-bowel obstruction. 2. No free intraperitoneal air identified. 3. No acute cardiopulmonary process identified. XR ABDOMEN (KUB) (SINGLE AP VIEW)   Final Result   Gastric tube tip just within the body of the stomach with the side port seen   in the distal esophagus. Recommend readjusting the tube tip into the distal   antrum of the stomach for more physiologic positioning. Probable distal partial small bowel obstruction which is unchanged. XR ABDOMEN (KUB) (SINGLE AP VIEW)   Final Result      1. NG tube in place extending into the mid stomach.       2.  Patchy interstitial opacity in both mid lower lung fields is exaggerated   by the poor level of inspiration though suggests pneumonitis or at least   atelectasis and similar to the prior study. XR ABDOMEN (KUB) (SINGLE AP VIEW)   Final Result   Status post gastric tube placement with the tip along the body of the   stomach. The side port appears near the gastric wall. Suggest readjusting   the gastric tube tip into distal stomach for more physiologic positioning. Slight decrease in the amount gastric and bowel dilatation         XR ABDOMEN (2 VIEWS)   Final Result   Findings suspicious for a distal partial small bowel obstruction which may be   slightly less prominent from the prior CT scan. Mild constipation with no obvious colonic dilatation. CT ABDOMEN PELVIS W IV CONTRAST Additional Contrast? None   Final Result   Abnormally dilated stomach and proximal small bowel loops with relative   collapse of distal small bowel loops. , Compatible with small bowel   obstruction. There is a subtle swirled appearance to the mesentery as seen on   image number 98 with a questionable internal hernia on image number 112 in   the midline. Reactive fluid is seen in the pelvis      RECOMMENDATIONS:   Unavailable         FL SMALL BOWEL FOLLOW THROUGH ONLY    (Results Pending)           Assessment/Plan:    Active Hospital Problems    Diagnosis     Small bowel obstruction ([facility] [address] [W42.027]        \"46 y. o. male with hx of dm2, gerd, MRDD who presented to [facility] with n/v and constipation and found to have SBO. \"      SBO. NG, IVFs, eventual diet advancement. General surgery consulted. Hypernatremia. Improved with hypotonic IVFs. DM2. Insulin regimen while here. F/u A1c. Hypothyroidism. Clinically euthyroid. Continue home dose of levothyroxine. Seizure disorder  - restarted carbamazepine, zonisamide, lamotrigine.   Also on gabapentin.        DVT Prophylaxis: enoxaparin  Diet: Diet NPO  Code Status: Full Code    PT/OT Eval Status: not able to participate     Dispo - when bowel function returns. Perhaps ready for discharge 1/13 - 1/19.         Donya Whitfield MD

## 2022-01-10 NOTE — PROGRESS NOTES
[facility] SURGERY    PATIENT NAME: Michael Sharma     TODAY'S DATE: 1/10/2022    CHIEF COMPLAINT: None    INTERVAL HISTORY/HPI:    Pt stable overnight. No fever or chills. No bowel function. Still fairly high nasogastric tube output. REVIEW OF SYSTEMS:  Pertinent positives and negatives as per interval history section    OBJECTIVE:  VITALS:  BP (!) 157/93   Pulse 97   Temp 97.8 °F (36.6 °C) (Axillary)   Resp 16   Ht 5' 9\" (1.753 m)   Wt 126 lb (57.2 kg)   SpO2 96%   BMI 18.61 kg/m²     INTAKE/OUTPUT:    I/O last 3 completed shifts:  In: -   Out: 1500 [Emesis/NG output:1500]  No intake/output data recorded. CONSTITUTIONAL:  awake and alert  LUNGS:  Respirations easy and unlabored  CARD:  regular rate and rhythm  ABDOMEN:  hypoactive bowel sounds, soft, non-distended, non-tender     Data:  CBC: Recent Labs     01/07/22  1302 01/08/22  0600 01/10/22  0717   WBC 12.1* 8.5 6.8   HGB 15.6 14.5 14.2   HCT 48.5 44.8 43.1    204 223     BMP:    Recent Labs     01/09/22  1325 01/10/22  0014 01/10/22  0717    144 143   K 2.8* 3.2* 3.3*    110 108   CO2 24 27 25   BUN 16 12 13   CREATININE 0.7* 0.7* 0.6*   GLUCOSE 243* 193* 225*     Hepatic:   Recent Labs     01/07/22  1302   AST 26   ALT 22   BILITOT 0.4   ALKPHOS 174*     Mag:      Recent Labs     01/08/22  0600 01/09/22  1325 01/10/22  0717   MG 2.00 1.90 2.10      Phos:     Recent Labs     01/09/22  1325   PHOS 1.8*      INR: No results for input(s): INR in the last 72 hours. Radiology Review:  *Imaging personally reviewed by me. Small bowel follow-through ordered      ASSESSMENT AND PLAN:  Small bowel obstruction. Clinically stable but still with fairly high NG output and no bowel function. Check small bowel follow-through today for more definitive evaluation.      Electronically signed by Carlos Jama MD     [id number]

## 2022-01-11 LAB
ESTIMATED AVERAGE GLUCOSE: 254.7 MG/DL
GLUCOSE BLD-MCNC: 266 MG/DL (ref 70–99)
GLUCOSE BLD-MCNC: 289 MG/DL (ref 70–99)
GLUCOSE BLD-MCNC: 291 MG/DL (ref 70–99)
GLUCOSE BLD-MCNC: 321 MG/DL (ref 70–99)
HBA1C MFR BLD: 10.5 %
PERFORMED ON: ABNORMAL

## 2022-01-11 PROCEDURE — 6360000002 HC RX W HCPCS: Performed by: INTERNAL MEDICINE

## 2022-01-11 PROCEDURE — 2580000003 HC RX 258: Performed by: INTERNAL MEDICINE

## 2022-01-11 PROCEDURE — 1200000000 HC SEMI PRIVATE

## 2022-01-11 PROCEDURE — 99232 SBSQ HOSP IP/OBS MODERATE 35: CPT | Performed by: SURGERY

## 2022-01-11 PROCEDURE — APPSS45 APP SPLIT SHARED TIME 31-45 MINUTES: Performed by: CLINICAL NURSE SPECIALIST

## 2022-01-11 PROCEDURE — 6370000000 HC RX 637 (ALT 250 FOR IP): Performed by: INTERNAL MEDICINE

## 2022-01-11 PROCEDURE — 2500000003 HC RX 250 WO HCPCS: Performed by: INTERNAL MEDICINE

## 2022-01-11 RX ORDER — INSULIN GLARGINE 100 [IU]/ML
15 INJECTION, SOLUTION SUBCUTANEOUS NIGHTLY
Status: DISCONTINUED | OUTPATIENT
Start: 2022-01-12 | End: 2022-01-12

## 2022-01-11 RX ORDER — POTASSIUM CHLORIDE AND SODIUM CHLORIDE 450; 150 MG/100ML; MG/100ML
INJECTION, SOLUTION INTRAVENOUS CONTINUOUS
Status: DISCONTINUED | OUTPATIENT
Start: 2022-01-11 | End: 2022-01-12

## 2022-01-11 RX ADMIN — LAMOTRIGINE 450 MG: 25 TABLET ORAL at 23:12

## 2022-01-11 RX ADMIN — POTASSIUM CHLORIDE, DEXTROSE MONOHYDRATE AND SODIUM CHLORIDE: 150; 5; 450 INJECTION, SOLUTION INTRAVENOUS at 08:53

## 2022-01-11 RX ADMIN — CARBAMAZEPINE 400 MG: 100 TABLET, EXTENDED RELEASE ORAL at 23:13

## 2022-01-11 RX ADMIN — ZONISAMIDE 400 MG: 100 CAPSULE ORAL at 08:40

## 2022-01-11 RX ADMIN — LAMOTRIGINE 400 MG: 100 TABLET ORAL at 08:40

## 2022-01-11 RX ADMIN — POTASSIUM CHLORIDE AND SODIUM CHLORIDE: 450; 150 INJECTION, SOLUTION INTRAVENOUS at 17:10

## 2022-01-11 RX ADMIN — GABAPENTIN 300 MG: 300 CAPSULE ORAL at 12:18

## 2022-01-11 RX ADMIN — INSULIN LISPRO 3 UNITS: 100 INJECTION, SOLUTION INTRAVENOUS; SUBCUTANEOUS at 12:18

## 2022-01-11 RX ADMIN — Medication 3 MG: at 23:14

## 2022-01-11 RX ADMIN — GABAPENTIN 300 MG: 300 CAPSULE ORAL at 23:12

## 2022-01-11 RX ADMIN — INSULIN LISPRO 2 UNITS: 100 INJECTION, SOLUTION INTRAVENOUS; SUBCUTANEOUS at 23:15

## 2022-01-11 RX ADMIN — INSULIN LISPRO 3 UNITS: 100 INJECTION, SOLUTION INTRAVENOUS; SUBCUTANEOUS at 08:43

## 2022-01-11 RX ADMIN — CARBAMAZEPINE 400 MG: 100 TABLET, EXTENDED RELEASE ORAL at 12:18

## 2022-01-11 RX ADMIN — Medication 10 ML: at 08:50

## 2022-01-11 RX ADMIN — Medication 10 ML: at 23:14

## 2022-01-11 RX ADMIN — GABAPENTIN 300 MG: 300 CAPSULE ORAL at 08:40

## 2022-01-11 RX ADMIN — ENOXAPARIN SODIUM 40 MG: 40 INJECTION SUBCUTANEOUS at 08:40

## 2022-01-11 RX ADMIN — INSULIN LISPRO 3 UNITS: 100 INJECTION, SOLUTION INTRAVENOUS; SUBCUTANEOUS at 16:29

## 2022-01-11 RX ADMIN — LEVOTHYROXINE SODIUM 75 MCG: 0.03 TABLET ORAL at 08:40

## 2022-01-11 RX ADMIN — GABAPENTIN 300 MG: 300 CAPSULE ORAL at 17:07

## 2022-01-11 RX ADMIN — INSULIN GLARGINE 5 UNITS: 100 INJECTION, SOLUTION SUBCUTANEOUS at 08:43

## 2022-01-11 RX ADMIN — SERTRALINE 50 MG: 50 TABLET, FILM COATED ORAL at 23:12

## 2022-01-11 ASSESSMENT — PAIN SCALES - WONG BAKER: WONGBAKER_NUMERICALRESPONSE: 0

## 2022-01-11 NOTE — CARE COORDINATION
Hospital day 4: Patient on C3 re SBO followed by IM and General Surgery. Patient from group home and plans to return, group home can assist with transport. Referral pending with [facility] Facility will not have 24 hr supervision, but can support altered diet. Will need paper rx at dc. NG/restraints. SW will ct to follow. YOLANDA Fields

## 2022-01-11 NOTE — PROGRESS NOTES
Paused CLWS to admin morning medications.     Electronically signed by Petrona Bird RN on 1/11/2022 at 8:39 AM

## 2022-01-11 NOTE — PROGRESS NOTES
[facility] SURGERY    PATIENT NAME: Khang Sharma     TODAY'S DATE: 1/11/2022    CHIEF COMPLAINT: Unable to obtain    INTERVAL HISTORY/HPI:    Pt with 2 BMs per nursing. No acute events. REVIEW OF SYSTEMS:  Pertinent positives and negatives as per interval history section    OBJECTIVE:  VITALS:  BP (!) 129/94   Pulse 87   Temp 97.5 °F (36.4 °C) (Axillary)   Resp 14   Ht 5' 9\" (1.753 m)   Wt 126 lb (57.2 kg)   SpO2 97%   BMI 18.61 kg/m²     INTAKE/OUTPUT:    I/O last 3 completed shifts: In: 0   Out: 1300 [Emesis/NG output:1300]  I/O this shift:  In: 2100 [I.V.:2100]  Out: -     CONSTITUTIONAL:  Calm, in restraints  LUNGS:  Respirations easy and unlabored  CARD:  regular rate and rhythm  ABDOMEN:  hypoactive bowel sounds, soft, non-distended, non-tender     Data:  CBC:   Recent Labs     01/10/22  0717   WBC 6.8   HGB 14.2   HCT 43.1        BMP:    Recent Labs     01/10/22  0014 01/10/22  0717 01/10/22  1406    143 146*   K 3.2* 3.3* 3.2*    108 104   CO2 27 25 29   BUN 12 13 14   CREATININE 0.7* 0.6* 0.6*   GLUCOSE 193* 225* 200*     Hepatic:   No results for input(s): AST, ALT, ALB, BILITOT, ALKPHOS in the last 72 hours. Mag:      Recent Labs     01/09/22  1325 01/10/22  0717 01/10/22  1406   MG 1.90 2.10 2.30      Phos:     Recent Labs     01/09/22  1325   PHOS 1.8*      INR: No results for input(s): INR in the last 72 hours. Radiology Review:  *Imaging personally reviewed by me. EXAMINATION:   SMALL BOWEL FOLLOW THROUGH SERIES     1/10/2022     TECHNIQUE:   Small bowel follow through series was performed with overhead images and spot   images. FLUOROSCOPY DOSE AND TYPE OR TIME AND EXPOSURES:   6 total images     Fluoroscopy not used     COMPARISON:   Recent CT scan     HISTORY:   ORDERING SYSTEM PROVIDED HISTORY: small bowel obstruction   TECHNOLOGIST PROVIDED HISTORY:   Keep in restraints.  Can be portable xrays.  Use gastrograffin.    Reason for exam:->small bowel obstruction   Reason for Exam: sbo     FINDINGS:    image of the abdomen demonstrates multiple dilated loops of both small   and large bowel. Contrast was administered via NG tube     Abnormally dilated small bowel loops are seen in the midline, greatest on the   left.  Bowel loops are dilated up to 5.5 cm. Distal small bowel loops in the right lower quadrant are smaller in caliber   measuring up to 2.5 cm     Contrast is seen in the colon at 3 hours. Impression:     Abnormally dilated small bowel loops in the left mid abdomen, with a   suspected transition point in the midline as small bowel loops in the right   lower quadrant are smaller in caliber.  Contrast reaches colon by 3 hours. Findings are suggestive of partial small bowel obstruction     RECOMMENDATIONS:   Unavailable            ASSESSMENT AND PLAN:  Small bowel obstruction vs ileus    SBFT as above, with contrast in colon. D/C ngt today, clear liquids as tolerated. Electronically signed by ELIZABET Stack - [address] [facility]    Surgery Staff    I have examined this patient, and read and agree with the note by Jose A Pierre CNP from today; more than half of the total time was spent by me on the encounter.      Pillo Zarate MD

## 2022-01-11 NOTE — PROGRESS NOTES
Patient rested quietly through the night. Remained in restraints as ordered. Patient had 2 BMs through this shift, took HS medications without difficulty and tolerated well. NGT suction held X 1 hour after medication and turned back on with little output noted trough this shift. Patient resting quietly at this time in NAD.

## 2022-01-11 NOTE — PROGRESS NOTES
Bridled NG removed. Pt tolerated well.     Electronically signed by Kari Peraza RN on 1/11/2022 at 12:07 PM

## 2022-01-11 NOTE — PROGRESS NOTES
Hospitalist Progress Note      PCP: Alda Montes MD    Date of Admission: 1/7/2022    Chief Complaint: n/v        Subjective:  He had BMs. He seems about the same. Medications:  Reviewed    Infusion Medications    0.45 % NaCl with KCl 20 mEq      dextrose      sodium chloride       Scheduled Medications    [START ON 1/12/2022] insulin glargine  15 Units SubCUTAneous Nightly    carBAMazepine  400 mg Oral Q12H    gabapentin  300 mg Oral 4x Daily    lamoTRIgine  450 mg Oral Nightly    lamoTRIgine  400 mg Oral QAM    levothyroxine  75 mcg Oral Daily    melatonin  3 mg Oral Nightly    sertraline  50 mg Oral Nightly    zonisamide  400 mg Oral Daily    ketoconazole   Topical Weekly    sodium chloride flush  5-40 mL IntraVENous 2 times per day    enoxaparin  40 mg SubCUTAneous Daily    insulin lispro  0-6 Units SubCUTAneous TID WC    insulin lispro  0-3 Units SubCUTAneous Nightly     PRN Meds: diatrizoate meglumine-sodium, glucose, dextrose, glucagon (rDNA), dextrose, diazePAM, sodium chloride flush, sodium chloride, ondansetron **OR** ondansetron, polyethylene glycol, acetaminophen **OR** acetaminophen, prochlorperazine, morphine, LORazepam      Intake/Output Summary (Last 24 hours) at 1/11/2022 1447  Last data filed at 1/11/2022 0850  Gross per 24 hour   Intake 2100 ml   Output 500 ml   Net 1600 ml       Physical Exam Performed:    /80   Pulse 92   Temp 97.6 °F (36.4 °C) (Oral)   Resp 14   Ht 5' 9\" (1.753 m)   Wt 126 lb (57.2 kg)   SpO2 96%   BMI 18.61 kg/m²       General appearance:  No apparent distress, appears stated age and cooperative. HEENT:  Normal cephalic, atraumatic without obvious deformity. Pupils equal, round, and reactive to light.  Extra ocular muscles intact. Conjunctivae/corneas clear.  hard of hearing. Neck: Supple, with full range of motion. No jugular venous distention. Trachea midline. Respiratory:  Normal respiratory effort.  Clear to auscultation, bilaterally without Rales/Wheezes/Rhonchi. Cardiovascular:  Regular rate and rhythm with normal S1/S2 without murmurs, rubs or gallops. Abdomen: Soft, non-tender, not distended with infrequent bowel sounds. Musculoskeletal:  No clubbing, cyanosis or edema bilaterally.  Full range of motion without deformity. Skin: Skin color, texture, turgor normal.  No rashes or lesions. Neurologic:  blind  Psychiatric:  alert, unable to communicate, does not follow commands  Capillary Refill: Brisk,3 seconds, normal  Peripheral Pulses: +2 palpable, equal bilaterally          Labs:   Recent Labs     01/10/22  0717   WBC 6.8   HGB 14.2   HCT 43.1        Recent Labs     01/09/22  1325 01/09/22  1325 01/10/22  0014 01/10/22  0717 01/10/22  1406      < > 144 143 146*   K 2.8*   < > 3.2* 3.3* 3.2*      < > 110 108 104   CO2 24   < > 27 25 29   BUN 16   < > 12 13 14   CREATININE 0.7*   < > 0.7* 0.6* 0.6*   CALCIUM 8.3   < > 8.5 8.6 9.3   PHOS 1.8*  --   --   --   --     < > = values in this interval not displayed. No results for input(s): AST, ALT, BILIDIR, BILITOT, ALKPHOS in the last 72 hours. No results for input(s): INR in the last 72 hours. No results for input(s): Bennetta Downy in the last 72 hours. Urinalysis:      Lab Results   Component Value Date    NITRU Negative 12/10/2018    WBCUA 3-5 12/10/2018    BACTERIA Rare 12/10/2018    RBCUA 0-2 12/10/2018    BLOODU Negative 12/10/2018    SPECGRAV 1.020 12/10/2018    GLUCOSEU Negative 12/10/2018    GLUCOSEU >=1000 04/25/2012       Radiology:  FL SMALL BOWEL FOLLOW THROUGH ONLY   Final Result   Abnormally dilated small bowel loops in the left mid abdomen, with a   suspected transition point in the midline as small bowel loops in the right   lower quadrant are smaller in caliber. Contrast reaches colon by 3 hours.    Findings are suggestive of partial small bowel obstruction      RECOMMENDATIONS:   Unavailable         XR ACUTE ABD SERIES CHEST 1 VW Final Result   1. Persistent marked small bowel distension consistent with a high-grade   small-bowel obstruction. 2. No free intraperitoneal air identified. 3. No acute cardiopulmonary process identified. XR ABDOMEN (KUB) (SINGLE AP VIEW)   Final Result   Gastric tube tip just within the body of the stomach with the side port seen   in the distal esophagus. Recommend readjusting the tube tip into the distal   antrum of the stomach for more physiologic positioning. Probable distal partial small bowel obstruction which is unchanged. XR ABDOMEN (KUB) (SINGLE AP VIEW)   Final Result      1. NG tube in place extending into the mid stomach. 2.  Patchy interstitial opacity in both mid lower lung fields is exaggerated   by the poor level of inspiration though suggests pneumonitis or at least   atelectasis and similar to the prior study. XR ABDOMEN (KUB) (SINGLE AP VIEW)   Final Result   Status post gastric tube placement with the tip along the body of the   stomach. The side port appears near the gastric wall. Suggest readjusting   the gastric tube tip into distal stomach for more physiologic positioning. Slight decrease in the amount gastric and bowel dilatation         XR ABDOMEN (2 VIEWS)   Final Result   Findings suspicious for a distal partial small bowel obstruction which may be   slightly less prominent from the prior CT scan. Mild constipation with no obvious colonic dilatation. CT ABDOMEN PELVIS W IV CONTRAST Additional Contrast? None   Final Result   Abnormally dilated stomach and proximal small bowel loops with relative   collapse of distal small bowel loops. , Compatible with small bowel   obstruction. There is a subtle swirled appearance to the mesentery as seen on   image number 98 with a questionable internal hernia on image number 112 in   the midline.   Reactive fluid is seen in the pelvis      RECOMMENDATIONS:   Unavailable Assessment/Plan:    Active Hospital Problems    Diagnosis     Small bowel obstruction ([facility] [address] [Q53.120]        \"46 y. o. male with hx of dm2, gerd, MRDD who presented to [facility] with n/v and constipation and found to have SBO. \"      SBO. Had multiple BMs on 1/11. NG removed. Advancing diet per surgery (baseline diet is pureed solids and honey-thick liquids), IVFs meanwhile. Hypernatremia. Improved with hypotonic IVFs. DM2. Adjusted insulin regimen while here. A1c 10.5. Hypothyroidism. Clinically euthyroid. Continue home dose of levothyroxine. Seizure disorder. Restarted carbamazepine, zonisamide, lamotrigine. Also on gabapentin.        DVT Prophylaxis: enoxaparin  Diet: ADULT DIET; Clear Liquid  Code Status: Full Code    PT/OT Eval Status: not able to participate     Dispo - when tolerating PO. Perhaps ready for discharge 1/13 - 1/16. He lives at a group home.       Vincente Dubin, MD

## 2022-01-12 LAB
ANION GAP SERPL CALCULATED.3IONS-SCNC: 8 MMOL/L (ref 3–16)
BUN BLDV-MCNC: 10 MG/DL (ref 7–20)
CALCIUM SERPL-MCNC: 8.4 MG/DL (ref 8.3–10.6)
CHLORIDE BLD-SCNC: 113 MMOL/L (ref 99–110)
CO2: 26 MMOL/L (ref 21–32)
CREAT SERPL-MCNC: 0.7 MG/DL (ref 0.9–1.3)
GFR AFRICAN AMERICAN: >60
GFR NON-AFRICAN AMERICAN: >60
GLUCOSE BLD-MCNC: 164 MG/DL (ref 70–99)
GLUCOSE BLD-MCNC: 186 MG/DL (ref 70–99)
GLUCOSE BLD-MCNC: 190 MG/DL (ref 70–99)
GLUCOSE BLD-MCNC: 247 MG/DL (ref 70–99)
GLUCOSE BLD-MCNC: 273 MG/DL (ref 70–99)
GLUCOSE BLD-MCNC: 274 MG/DL (ref 70–99)
HCT VFR BLD CALC: 42.3 % (ref 40.5–52.5)
HEMOGLOBIN: 14.3 G/DL (ref 13.5–17.5)
MCH RBC QN AUTO: 30 PG (ref 26–34)
MCHC RBC AUTO-ENTMCNC: 33.7 G/DL (ref 31–36)
MCV RBC AUTO: 89.1 FL (ref 80–100)
PDW BLD-RTO: 14.5 % (ref 12.4–15.4)
PERFORMED ON: ABNORMAL
PLATELET # BLD: 234 K/UL (ref 135–450)
PMV BLD AUTO: 7.8 FL (ref 5–10.5)
POTASSIUM REFLEX MAGNESIUM: 3.8 MMOL/L (ref 3.5–5.1)
RBC # BLD: 4.75 M/UL (ref 4.2–5.9)
SODIUM BLD-SCNC: 147 MMOL/L (ref 136–145)
WBC # BLD: 6.1 K/UL (ref 4–11)

## 2022-01-12 PROCEDURE — 6370000000 HC RX 637 (ALT 250 FOR IP): Performed by: INTERNAL MEDICINE

## 2022-01-12 PROCEDURE — 85027 COMPLETE CBC AUTOMATED: CPT

## 2022-01-12 PROCEDURE — 36415 COLL VENOUS BLD VENIPUNCTURE: CPT

## 2022-01-12 PROCEDURE — APPSS45 APP SPLIT SHARED TIME 31-45 MINUTES: Performed by: CLINICAL NURSE SPECIALIST

## 2022-01-12 PROCEDURE — 6360000002 HC RX W HCPCS: Performed by: INTERNAL MEDICINE

## 2022-01-12 PROCEDURE — 99231 SBSQ HOSP IP/OBS SF/LOW 25: CPT | Performed by: SURGERY

## 2022-01-12 PROCEDURE — 1200000000 HC SEMI PRIVATE

## 2022-01-12 PROCEDURE — 2580000003 HC RX 258: Performed by: INTERNAL MEDICINE

## 2022-01-12 PROCEDURE — 80048 BASIC METABOLIC PNL TOTAL CA: CPT

## 2022-01-12 RX ORDER — INSULIN GLARGINE 100 [IU]/ML
15 INJECTION, SOLUTION SUBCUTANEOUS NIGHTLY
Status: DISCONTINUED | OUTPATIENT
Start: 2022-01-13 | End: 2022-01-13 | Stop reason: HOSPADM

## 2022-01-12 RX ORDER — INSULIN GLARGINE 100 [IU]/ML
15 INJECTION, SOLUTION SUBCUTANEOUS ONCE
Status: COMPLETED | OUTPATIENT
Start: 2022-01-12 | End: 2022-01-12

## 2022-01-12 RX ORDER — DEXTROSE MONOHYDRATE 50 MG/ML
INJECTION, SOLUTION INTRAVENOUS CONTINUOUS
Status: DISCONTINUED | OUTPATIENT
Start: 2022-01-12 | End: 2022-01-13 | Stop reason: HOSPADM

## 2022-01-12 RX ADMIN — ZONISAMIDE 400 MG: 100 CAPSULE ORAL at 09:03

## 2022-01-12 RX ADMIN — LAMOTRIGINE 400 MG: 100 TABLET ORAL at 09:03

## 2022-01-12 RX ADMIN — SERTRALINE 50 MG: 50 TABLET, FILM COATED ORAL at 23:53

## 2022-01-12 RX ADMIN — GABAPENTIN 300 MG: 300 CAPSULE ORAL at 12:12

## 2022-01-12 RX ADMIN — LEVOTHYROXINE SODIUM 75 MCG: 0.03 TABLET ORAL at 05:34

## 2022-01-12 RX ADMIN — Medication 3 MG: at 23:52

## 2022-01-12 RX ADMIN — LAMOTRIGINE 450 MG: 25 TABLET ORAL at 23:52

## 2022-01-12 RX ADMIN — INSULIN GLARGINE 15 UNITS: 100 INJECTION, SOLUTION SUBCUTANEOUS at 09:07

## 2022-01-12 RX ADMIN — DEXTROSE MONOHYDRATE: 50 INJECTION, SOLUTION INTRAVENOUS at 08:58

## 2022-01-12 RX ADMIN — INSULIN LISPRO 3 UNITS: 100 INJECTION, SOLUTION INTRAVENOUS; SUBCUTANEOUS at 17:03

## 2022-01-12 RX ADMIN — GABAPENTIN 300 MG: 300 CAPSULE ORAL at 23:52

## 2022-01-12 RX ADMIN — INSULIN LISPRO 2 UNITS: 100 INJECTION, SOLUTION INTRAVENOUS; SUBCUTANEOUS at 12:02

## 2022-01-12 RX ADMIN — GABAPENTIN 300 MG: 300 CAPSULE ORAL at 09:03

## 2022-01-12 RX ADMIN — CARBAMAZEPINE 400 MG: 100 TABLET, EXTENDED RELEASE ORAL at 09:03

## 2022-01-12 RX ADMIN — INSULIN LISPRO 1 UNITS: 100 INJECTION, SOLUTION INTRAVENOUS; SUBCUTANEOUS at 09:08

## 2022-01-12 RX ADMIN — INSULIN LISPRO 2 UNITS: 100 INJECTION, SOLUTION INTRAVENOUS; SUBCUTANEOUS at 23:54

## 2022-01-12 RX ADMIN — CARBAMAZEPINE 400 MG: 100 TABLET, EXTENDED RELEASE ORAL at 23:54

## 2022-01-12 RX ADMIN — ENOXAPARIN SODIUM 40 MG: 40 INJECTION SUBCUTANEOUS at 09:03

## 2022-01-12 RX ADMIN — GABAPENTIN 300 MG: 300 CAPSULE ORAL at 17:03

## 2022-01-12 ASSESSMENT — PAIN SCALES - WONG BAKER: WONGBAKER_NUMERICALRESPONSE: 0

## 2022-01-12 NOTE — DISCHARGE INSTR - COC
Continuity of Care Form    Patient Name: Roopa Stovall   :  1975  MRN:  [id number]    Admit date:  2022  Discharge date:  2022      Code Status Order: Full Code   Advance Directives:      Admitting Physician:  Jennifer Nice MD  PCP: Camille Grande MD    Discharging Nurse: Angel Ma 23 Unit/Room#: 0321/0321-01  Discharging Unit Phone Number: [phone]    Emergency Contact:   Extended Emergency Contact Information  Primary Emergency Contact: Andressa Sharma  Address: [address] [place] [address] Phone: [phone]  Mobile Phone: [phone]  Relation: Legal Guardian  Secondary Emergency Contact: Consuelo Carrasquillo Phone: [phone]  Relation: Aunt/Uncle    Past Surgical History:  Past Surgical History:   Procedure Laterality Date    CHEST TUBE INSERTION      upper lobes bilat; when born    OTHER SURGICAL HISTORY Right 16    excision thumb mass       Immunization History:   Immunization History   Administered Date(s) Administered    Influenza Virus Vaccine 10/08/2011    Influenza, Rufus Chung, 6 mo and older, IM, PF (Flulaval, Fluarix) 2018    Pneumococcal Polysaccharide ([id number]) 2012    Tdap (Boostrix, Adacel) 2017       Active Problems:  Patient Active Problem List   Diagnosis Code    Epilepsy ([facility] [address] G40.909    Mental retardation F79    Blindness, 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    Aspiration pneumonia ([facility] [address] J69.0    DM2 (diabetes mellitus, type 2) ([facility] [address] E11.9    Sepsis ([facility] [address] A41.9    DM (diabetes mellitus) ([facility] [address] E11.9    CAP (community acquired pneumonia) J18.9    Infection of thumb L08.9    Hypernatremia E87.0    Acute hypernatremia E87.0    Acute encephalopathy G93.40    Hypokalemia E87.6    Seizure disorder ([facility] [address] G40.909    Hyperglycemia R73.9    Abnormal finding on GI tract imaging R93.3    Status epilepticus ([facility] [address] G40.901    Goals of care, counseling/discussion Z71.89    DNR (do not resuscitate) discussion Z71.89 Encounter for palliative care Z51.5    Small bowel obstruction ([facility] [address] K56.609       Isolation/Infection:   Isolation            No Isolation          Patient Infection Status       Infection Onset Added Last Indicated Last Indicated By Review Planned Expiration Resolved Resolved By    None active    Resolved    MRSA  01/18/16 01/18/16 King Goodwin RN   03/06/18 Daine Cushing, RN            Nurse Assessment:  Last Vital Signs: BP (!) 144/91   Pulse 88   Temp 97.7 °F (36.5 °C) (Axillary)   Resp 18   Ht 5' 9\" (1.753 m)   Wt 126 lb (57.2 kg)   SpO2 94%   BMI 18.61 kg/m²     Last documented pain score (0-10 scale):    Last Weight:   Wt Readings from Last 1 Encounters:   01/07/22 126 lb (57.2 kg)     Mental Status:  alert and nonverbal    IV Access:  - None    Nursing Mobility/ADLs:  Walking   Dependent  Transfer  Dependent  Bathing  Dependent  Dressing  Dependent  Toileting  Dependent  Feeding  Assisted  Med Admin  Assisted  Med Delivery   whole    Wound Care Documentation and Therapy:        Elimination:  Continence: Bowel: YES  Bladder: Yes  Urinary Catheter: None   Colostomy/Ileostomy/Ileal Conduit: No       Date of Last BM: 1/13/2022    Intake/Output Summary (Last 24 hours) at 1/12/2022 0957  Last data filed at 1/12/2022 0928  Gross per 24 hour   Intake 4936.41 ml   Output 450 ml   Net 4486.41 ml     I/O last 3 completed shifts: In: 6536.4 [P.O.:150; I.V.:6386.4]  Out: 450 [Urine:450]    Safety Concerns:     None    Impairments/Disabilities:      Speech, Vision, and Hearing    Nutrition Therapy:  Current Nutrition Therapy:   - Oral Diet:  General; PUREED DIET    Routes of Feeding: Oral  Liquids: Honey Thick Liquids  Daily Fluid Restriction: no  Last Modified Barium Swallow with Video (Video Swallowing Test): not done    Treatments at the Time of Hospital Discharge:   Respiratory Treatments:   Oxygen Therapy:  is not on home oxygen therapy.   Ventilator:    - No ventilator support    Rehab Therapies: Nurse  Weight Bearing Status/Restrictions: No weight bearing restirctions  Other Medical Equipment (for information only, NOT a DME order):  wheelchair  Other Treatments:     Patient's personal belongings (please select all that are sent with patient):  None    RN SIGNATURE:  Electronically signed by Olga Velazquez RN on 1/13/22 at 2:16 PM EST    CASE MANAGEMENT/SOCIAL WORK SECTION    Inpatient Status Date: 01/13/2022    Readmission Risk Assessment Score:  Readmission Risk              Risk of Unplanned Readmission:  19             / signature: Electronically signed by YOLANDA Guzman on 1/13/22 at 11:44 AM EST    PHYSICIAN SECTION    Prognosis: Good    Condition at Discharge: Stable    Rehab Potential (if transferring to Rehab): Good    Recommended Labs or Other Treatments After Discharge: Follow up with PCP within 1-2 weeks. Physician Certification: I certify the above information and transfer of Dennie Blonder  is necessary for the continuing treatment of the diagnosis listed and that he requires Intermediate Nursing Care for less 30 days.      Update Admission H&P: No change in H&P    PHYSICIAN SIGNATURE:  Electronically signed by Juan Francisco Dunlap MD on 1/13/22 at 11:21 AM EST

## 2022-01-12 NOTE — PLAN OF CARE
Problem: Nutrition  Goal: Optimal nutrition therapy  Outcome: Ongoing  Note: Nutrition Problem #1: Inadequate protein-energy intake  Intervention: Food and/or Nutrient Delivery: Continue Current Diet,Start Oral Nutrition Supplement (ADAT per MD, add ONS when advanced)  Nutritional Goals: Pt will tolerate diet advancement w/o GI disturbance and intakes greater than 50% of meals

## 2022-01-12 NOTE — PROGRESS NOTES
Pt is alert and nonverbal. Restraints were removed at 0930 and pt has been tolerating. Pt has not tried to pull at his PIV. Pt has been making movements and rolls himself from side to side. Avasys still remains in place for pt safety. Writer fed pt breakfast and lunch and pt ate/drank 100% of both meals. No emesis. Pt tolerating clears and will try a full liquid dinner per orders. Multiple BMs today as well. Writer called pt's legal guardian/mom Renetta Blandon and gave her an update. Bed locked in lowest position with seizure precautions in place. Will continue to monitor.

## 2022-01-12 NOTE — CARE COORDINATION
Hospital day 5: Patient on C3 re SBO followed by IM and General Surgery. Patient from group home pureed honey thick diet and can feed himself baseline. Current clears trail removal of restraints this date. Referral can be accepted by [facility] referral to to [facility], with anticipated start of care Monday. Writer placed call to legal guardian/mother to update, DELIA left with [phone] manager. SW will ct to follow. YOLANDA Funes  1016: Return call from [facility] updated, no concerns, will need two hr head up on dc. YOLANDA Funes

## 2022-01-12 NOTE — PLAN OF CARE
Problem: Falls - Risk of:  Goal: Will remain free from falls  Description: Will remain free from falls  1/12/2022 0243 by Devon Staley RN  Outcome: Ongoing  1/11/2022 1948 by Lulu Mata RN  Outcome: Ongoing  Goal: Absence of physical injury  Description: Absence of physical injury  1/12/2022 0243 by Devon Staley RN  Outcome: Ongoing  1/11/2022 1948 by Lulu Mata RN  Outcome: Ongoing  Intervention: Assess risk factors for falls  1/11/2022 1948 by Lulu Mata RN  Note: Pt is high fall risk  Intervention: Postfall assessment  1/11/2022 1948 by Lulu Mata RN  Note: No falls sustained thus far this shift. Intervention: Manage a safe environment  1/11/2022 1948 by Lulu Mata RN  Note: Call light within reach. Bed side table within reach. Wheels locked. Bed in lowest position. Bed check in place. Pt instructed to call out for assistance; no evidence of learning noted; [employer] video monitoring utilized.     Intervention: Toileting assistance  1/11/2022 1948 by Lulu Mata RN  Note: Incont of urine and stool; saul care per staff PRN     Problem: Non-Violent Restraints  Goal: Removal from restraints as soon as assessed to be safe  Outcome: Ongoing  Goal: No harm/injury to patient while restraints in use  Outcome: Ongoing  Goal: Patient's dignity will be maintained  Outcome: Ongoing     Problem: Skin Integrity:  Goal: Will show no infection signs and symptoms  Description: Will show no infection signs and symptoms  Outcome: Ongoing  Goal: Absence of new skin breakdown  Description: Absence of new skin breakdown  Outcome: Ongoing

## 2022-01-12 NOTE — PROGRESS NOTES
Comprehensive Nutrition Assessment    Type and Reason for Visit:  NPO/Clear Liquid    Nutrition Recommendations/Plan:   1. ADAT per general surgery  2. Add ONS when able   3. Please obtain updated weight   4. If unable to advance po diet within 24-48 hours recommend to initiate TPN. Consult dietitians for recommendations. 5. Monitor nutrition adequacy, pertinent labs, bowel habits, wt changes, and clinical progress    Nutrition Assessment:  Pt admitted with N/V, constipation d/t SBO. PMH T2DM, MRDD, non-verbal. Per notes baseline diet is pureed with honey thick liquids. S/p SBFT on 1/11, NG removed and diet advanced to clear (honey thick) liquids on 1/11. BM x2 yesterday per notes. ADAT per MD, add honey thick ONS when able to optimize nutrition. Will monitor ability to advance po diet vs need for nutrition support. Malnutrition Assessment:  Malnutrition Status: At risk for malnutrition (Comment)    Context:  Acute Illness     Findings of the 6 clinical characteristics of malnutrition:  Energy Intake:  7 - 50% or less of estimated energy requirements for 5 or more days  Weight Loss:  Unable to assess       Estimated Daily Nutrient Needs:  Energy (kcal):  8965-3939 kcals/day; Weight Used for Energy Requirements:  Ideal (73kg)     Protein (g):  73-88 g/day; Weight Used for Protein Requirements:  Ideal (1-1.2)        Fluid (ml/day):  1 ml/kcal      Nutrition Related Findings:  Na 147. -321mg/dL x24hrs. NG to LCWS- removed on 1/11. +BM 1/11. Abdomen distended, BS active. Wounds:  None       Current Nutrition Therapies:    ADULT DIET; Clear Liquid; Moderately Thick (Honey)    Anthropometric Measures:  · Height: 5' 9\" (175.3 cm)  · Current Body Weight: 126 lb (57.2 kg)      · Ideal Body Weight: 160 lbs; % Ideal Body Weight 78.8 %   · BMI: 18.6  · BMI Categories: Normal Weight (BMI 18.5-24. 9)       Nutrition Diagnosis:   · Inadequate protein-energy intake related to altered GI function,altered GI structure as evidenced by NPO or clear liquid status due to medical condition    Nutrition Interventions:   Food and/or Nutrient Delivery:  Continue Current Diet,Start Oral Nutrition Supplement (ADAT per MD, add ONS when advanced)  Nutrition Education/Counseling:  Education not appropriate   Coordination of Nutrition Care:  Continue to monitor while inpatient    Goals:  Pt will tolerate diet advancement w/o GI disturbance and intakes greater than 50% of meals       Nutrition Monitoring and Evaluation:   Behavioral-Environmental Outcomes:  None Identified   Food/Nutrient Intake Outcomes:  Diet Advancement/Tolerance,Food and Nutrient Intake  Physical Signs/Symptoms Outcomes:  GI Status,Constipation,Weight,Nutrition Focused Physical Findings,Biochemical Data     Discharge Planning:     Too soon to determine     Electronically signed by Sagrario Mancilla RD on 1/12/22 at 10:58 AM EST    Contact: [phone]

## 2022-01-12 NOTE — PROGRESS NOTES
Hospitalist Progress Note      PCP: Troy Waldron MD    Date of Admission: 1/7/2022    Chief Complaint: n/v        Subjective:  He makes more spontaneous movements today. Still calm. Medications:  Reviewed    Infusion Medications    dextrose 75 mL/hr at 01/12/22 0858    dextrose      sodium chloride       Scheduled Medications    [START ON 1/13/2022] insulin glargine  15 Units SubCUTAneous Nightly    carBAMazepine  400 mg Oral Q12H    gabapentin  300 mg Oral 4x Daily    lamoTRIgine  450 mg Oral Nightly    lamoTRIgine  400 mg Oral QAM    levothyroxine  75 mcg Oral Daily    melatonin  3 mg Oral Nightly    sertraline  50 mg Oral Nightly    zonisamide  400 mg Oral Daily    ketoconazole   Topical Weekly    sodium chloride flush  5-40 mL IntraVENous 2 times per day    enoxaparin  40 mg SubCUTAneous Daily    insulin lispro  0-6 Units SubCUTAneous TID WC    insulin lispro  0-3 Units SubCUTAneous Nightly     PRN Meds: diatrizoate meglumine-sodium, glucose, dextrose, glucagon (rDNA), dextrose, diazePAM, sodium chloride flush, sodium chloride, ondansetron **OR** ondansetron, polyethylene glycol, acetaminophen **OR** acetaminophen, prochlorperazine, morphine, LORazepam      Intake/Output Summary (Last 24 hours) at 1/12/2022 1047  Last data filed at 1/12/2022 0928  Gross per 24 hour   Intake 4936.41 ml   Output 450 ml   Net 4486.41 ml       Physical Exam Performed:    BP (!) 144/91   Pulse 88   Temp 97.7 °F (36.5 °C) (Axillary)   Resp 18   Ht 5' 9\" (1.753 m)   Wt 126 lb (57.2 kg)   SpO2 94%   BMI 18.61 kg/m²       General appearance:  No apparent distress, appears stated age and cooperative. HEENT:  Normal cephalic, atraumatic without obvious deformity. Pupils equal, round, and reactive to light.  Extra ocular muscles intact. Conjunctivae/corneas clear.  hard of hearing. Neck: Supple, with full range of motion. No jugular venous distention. Trachea midline.   Respiratory:  Normal respiratory effort. Clear to auscultation, bilaterally without Rales/Wheezes/Rhonchi. Cardiovascular:  Regular rate and rhythm with normal S1/S2 without murmurs, rubs or gallops. Abdomen: Soft, non-tender, not distended with infrequent bowel sounds. Musculoskeletal:  No clubbing, cyanosis or edema bilaterally.  Full range of motion without deformity. Skin: Skin color, texture, turgor normal.  No rashes or lesions. Neurologic:  blind  Psychiatric:  alert, unable to communicate, does not follow commands  Capillary Refill: Brisk,3 seconds, normal  Peripheral Pulses: +2 palpable, equal bilaterally          Labs:   Recent Labs     01/10/22  0717 01/12/22  0515   WBC 6.8 6.1   HGB 14.2 14.3   HCT 43.1 42.3    234     Recent Labs     01/09/22  1325 01/10/22  0014 01/10/22  0717 01/10/22  1406 01/12/22  0515      < > 143 146* 147*   K 2.8*   < > 3.3* 3.2* 3.8      < > 108 104 113*   CO2 24   < > 25 29 26   BUN 16   < > 13 14 10   CREATININE 0.7*   < > 0.6* 0.6* 0.7*   CALCIUM 8.3   < > 8.6 9.3 8.4   PHOS 1.8*  --   --   --   --     < > = values in this interval not displayed. No results for input(s): AST, ALT, BILIDIR, BILITOT, ALKPHOS in the last 72 hours. No results for input(s): INR in the last 72 hours. No results for input(s): Bennetta Downy in the last 72 hours. Urinalysis:      Lab Results   Component Value Date    NITRU Negative 12/10/2018    WBCUA 3-5 12/10/2018    BACTERIA Rare 12/10/2018    RBCUA 0-2 12/10/2018    BLOODU Negative 12/10/2018    SPECGRAV 1.020 12/10/2018    GLUCOSEU Negative 12/10/2018    GLUCOSEU >=1000 04/25/2012       Radiology:  FL SMALL BOWEL FOLLOW THROUGH ONLY   Final Result   Abnormally dilated small bowel loops in the left mid abdomen, with a   suspected transition point in the midline as small bowel loops in the right   lower quadrant are smaller in caliber. Contrast reaches colon by 3 hours.    Findings are suggestive of partial small bowel obstruction      RECOMMENDATIONS:   Unavailable         XR ACUTE ABD SERIES CHEST 1 VW   Final Result   1. Persistent marked small bowel distension consistent with a high-grade   small-bowel obstruction. 2. No free intraperitoneal air identified. 3. No acute cardiopulmonary process identified. XR ABDOMEN (KUB) (SINGLE AP VIEW)   Final Result   Gastric tube tip just within the body of the stomach with the side port seen   in the distal esophagus. Recommend readjusting the tube tip into the distal   antrum of the stomach for more physiologic positioning. Probable distal partial small bowel obstruction which is unchanged. XR ABDOMEN (KUB) (SINGLE AP VIEW)   Final Result      1. NG tube in place extending into the mid stomach. 2.  Patchy interstitial opacity in both mid lower lung fields is exaggerated   by the poor level of inspiration though suggests pneumonitis or at least   atelectasis and similar to the prior study. XR ABDOMEN (KUB) (SINGLE AP VIEW)   Final Result   Status post gastric tube placement with the tip along the body of the   stomach. The side port appears near the gastric wall. Suggest readjusting   the gastric tube tip into distal stomach for more physiologic positioning. Slight decrease in the amount gastric and bowel dilatation         XR ABDOMEN (2 VIEWS)   Final Result   Findings suspicious for a distal partial small bowel obstruction which may be   slightly less prominent from the prior CT scan. Mild constipation with no obvious colonic dilatation. CT ABDOMEN PELVIS W IV CONTRAST Additional Contrast? None   Final Result   Abnormally dilated stomach and proximal small bowel loops with relative   collapse of distal small bowel loops. , Compatible with small bowel   obstruction. There is a subtle swirled appearance to the mesentery as seen on   image number 98 with a questionable internal hernia on image number 112 in   the midline.   Reactive fluid is seen in the pelvis      RECOMMENDATIONS:   Unavailable                 Assessment/Plan:    Active Hospital Problems    Diagnosis     Small bowel obstruction ([facility] [address] [H92.067]        \"46 y. o. male with hx of dm2, gerd, MRDD who presented to [facility] with n/v and constipation and found to have SBO. \"      SBO. Had multiple BMs on 1/11. NG removed. Advancing diet per surgery (baseline diet is pureed solids and honey-thick liquids), IVFs meanwhile. Hypernatremia. Improved with hypotonic IVFs. DM2. Adjusted insulin regimen while here. A1c 10.5. Hypothyroidism. Clinically euthyroid. Continue home dose of levothyroxine. Seizure disorder. Restarted carbamazepine, zonisamide, lamotrigine. Also on gabapentin.        DVT Prophylaxis: enoxaparin  Diet: ADULT DIET; Clear Liquid; Moderately Thick (Honey)  Code Status: Full Code    PT/OT Eval Status: not able to participate     Dispo - when tolerating PO. Perhaps ready for discharge 1/13 - 1/16, pending surgery input. He lives at a group home.       Nara Moralez MD

## 2022-01-12 NOTE — PROGRESS NOTES
Writer fed pt full liquid dinner and pt ate/drank 100% and tolerated. No emesis. Will continue to monitor.

## 2022-01-12 NOTE — PLAN OF CARE
Problem: Falls - Risk of:  Goal: Will remain free from falls  Description: Will remain free from falls  Outcome: Ongoing  Goal: Absence of physical injury  Description: Absence of physical injury  Outcome: Ongoing     Problem: Falls - Risk of: Intervention: Assess risk factors for falls  Note: Pt is high fall risk  Intervention: Postfall assessment  Note: No falls sustained thus far this shift. Intervention: Manage a safe environment  Note: Call light within reach. Bed side table within reach. Wheels locked. Bed in lowest position. Bed check in place. Pt instructed to call out for assistance; no evidence of learning noted; [employer] video monitoring utilized.     Intervention: Toileting assistance  Note: Incont of urine and stool; saul care per staff PRN

## 2022-01-12 NOTE — PROGRESS NOTES
[facility]    PATIENT NAME: Lianne Sharma     TODAY'S DATE: 1/12/2022    CHIEF COMPLAINT: Unable to obtain    INTERVAL HISTORY/HPI:    With BM last night      REVIEW OF SYSTEMS:  Pertinent positives and negatives as per interval history section    OBJECTIVE:  VITALS:  BP (!) 144/91   Pulse 88   Temp 97.7 °F (36.5 °C) (Axillary)   Resp 18   Ht 5' 9\" (1.753 m)   Wt 126 lb (57.2 kg)   SpO2 94%   BMI 18.61 kg/m²     INTAKE/OUTPUT:    I/O last 3 completed shifts: In: 6536.4 [P.O.:150; I.V.:6386.4]  Out: 450 [Urine:450]  I/O this shift: In: 800 [P.O.:800]  Out: -     CONSTITUTIONAL:  Calm, non communicative  LUNGS:  Respirations easy and unlabored  CARD:  regular rate and rhythm  ABDOMEN:   soft, non-distended, non-tender     Data:  CBC:   Recent Labs     01/10/22  0717 01/12/22  0515   WBC 6.8 6.1   HGB 14.2 14.3   HCT 43.1 42.3    234     BMP:    Recent Labs     01/10/22  0717 01/10/22  1406 01/12/22  0515    146* 147*   K 3.3* 3.2* 3.8    104 113*   CO2 25 29 26   BUN 13 14 10   CREATININE 0.6* 0.6* 0.7*   GLUCOSE 225* 200* 164*     Hepatic:   No results for input(s): AST, ALT, ALB, BILITOT, ALKPHOS in the last 72 hours. Mag:      Recent Labs     01/09/22  1325 01/10/22  0717 01/10/22  1406   MG 1.90 2.10 2.30      Phos:     Recent Labs     01/09/22  1325   PHOS 1.8*        ASSESSMENT AND PLAN:  Small bowel obstruction vs ileus    Advance diet today. Electronically signed by ELIZABET Leung CNP     Patient seen and agree with above and more than half of the total time was spent by me on the encounter.    Full liquid diet  Elizabeth Valente MD

## 2022-01-13 VITALS
HEART RATE: 90 BPM | SYSTOLIC BLOOD PRESSURE: 133 MMHG | OXYGEN SATURATION: 96 % | DIASTOLIC BLOOD PRESSURE: 85 MMHG | HEIGHT: 69 IN | WEIGHT: 126 LBS | RESPIRATION RATE: 14 BRPM | TEMPERATURE: 98 F | BODY MASS INDEX: 18.66 KG/M2

## 2022-01-13 LAB
ANION GAP SERPL CALCULATED.3IONS-SCNC: 10 MMOL/L (ref 3–16)
BUN BLDV-MCNC: 6 MG/DL (ref 7–20)
CALCIUM SERPL-MCNC: 8.3 MG/DL (ref 8.3–10.6)
CHLORIDE BLD-SCNC: 102 MMOL/L (ref 99–110)
CO2: 24 MMOL/L (ref 21–32)
CREAT SERPL-MCNC: 0.7 MG/DL (ref 0.9–1.3)
GFR AFRICAN AMERICAN: >60
GFR NON-AFRICAN AMERICAN: >60
GLUCOSE BLD-MCNC: 138 MG/DL (ref 70–99)
GLUCOSE BLD-MCNC: 185 MG/DL (ref 70–99)
GLUCOSE BLD-MCNC: 238 MG/DL (ref 70–99)
GLUCOSE BLD-MCNC: 265 MG/DL (ref 70–99)
GLUCOSE BLD-MCNC: 294 MG/DL (ref 70–99)
MAGNESIUM: 1.7 MG/DL (ref 1.8–2.4)
PERFORMED ON: ABNORMAL
POTASSIUM REFLEX MAGNESIUM: 3.3 MMOL/L (ref 3.5–5.1)
SODIUM BLD-SCNC: 136 MMOL/L (ref 136–145)

## 2022-01-13 PROCEDURE — 36415 COLL VENOUS BLD VENIPUNCTURE: CPT

## 2022-01-13 PROCEDURE — 6360000002 HC RX W HCPCS: Performed by: INTERNAL MEDICINE

## 2022-01-13 PROCEDURE — 83735 ASSAY OF MAGNESIUM: CPT

## 2022-01-13 PROCEDURE — 80048 BASIC METABOLIC PNL TOTAL CA: CPT

## 2022-01-13 PROCEDURE — 6370000000 HC RX 637 (ALT 250 FOR IP): Performed by: INTERNAL MEDICINE

## 2022-01-13 PROCEDURE — 99231 SBSQ HOSP IP/OBS SF/LOW 25: CPT | Performed by: SURGERY

## 2022-01-13 RX ORDER — INSULIN GLARGINE 100 [IU]/ML
18 INJECTION, SOLUTION SUBCUTANEOUS EVERY MORNING
Qty: 10 ML | Refills: 3
Start: 2022-01-13

## 2022-01-13 RX ORDER — POTASSIUM CHLORIDE 20 MEQ/1
40 TABLET, EXTENDED RELEASE ORAL ONCE
Status: COMPLETED | OUTPATIENT
Start: 2022-01-13 | End: 2022-01-13

## 2022-01-13 RX ADMIN — INSULIN LISPRO 1 UNITS: 100 INJECTION, SOLUTION INTRAVENOUS; SUBCUTANEOUS at 08:33

## 2022-01-13 RX ADMIN — LAMOTRIGINE 400 MG: 100 TABLET ORAL at 08:27

## 2022-01-13 RX ADMIN — ZONISAMIDE 400 MG: 100 CAPSULE ORAL at 08:39

## 2022-01-13 RX ADMIN — CARBAMAZEPINE 400 MG: 100 TABLET, EXTENDED RELEASE ORAL at 08:39

## 2022-01-13 RX ADMIN — ENOXAPARIN SODIUM 40 MG: 40 INJECTION SUBCUTANEOUS at 08:32

## 2022-01-13 RX ADMIN — INSULIN LISPRO 3 UNITS: 100 INJECTION, SOLUTION INTRAVENOUS; SUBCUTANEOUS at 12:09

## 2022-01-13 RX ADMIN — POTASSIUM CHLORIDE 40 MEQ: 1500 TABLET, EXTENDED RELEASE ORAL at 14:20

## 2022-01-13 RX ADMIN — GABAPENTIN 300 MG: 300 CAPSULE ORAL at 17:03

## 2022-01-13 RX ADMIN — LEVOTHYROXINE SODIUM 75 MCG: 0.03 TABLET ORAL at 06:21

## 2022-01-13 RX ADMIN — GABAPENTIN 300 MG: 300 CAPSULE ORAL at 08:27

## 2022-01-13 RX ADMIN — GABAPENTIN 300 MG: 300 CAPSULE ORAL at 12:09

## 2022-01-13 NOTE — CARE COORDINATION
Lee Silva with [facility] notified no home care needs; dc plan to group home with staff    Madisyn Sam RN, BSN CTN  [facility] [phone]

## 2022-01-13 NOTE — CARE COORDINATION
Hospital day 6: Patient on C3 re SBO followed by IM and General Surgery. Patient from group home baseline pureed, honey thick diet, self feeds with specialized equipment. Restraints removed tolerating well, per RN. [facility] following for skilled Home care needs. Full liquid diet. Group home will need few hours heads up on dc. Darron Corea, [address]: Discharge orders noted pending diet tolerance    CASE MANAGEMENT DISCHARGE SUMMARY    Discharge to: Residential  Group home     IMM given: (date) NA MARLYN    New Durable Medical Equipment ordered/agency: No new     Transportation: Via group home      Confirmed discharge plan with: Legal Guardian/Lakisha and [address] [place] [address] Home manager     RN, name: YOLANDA Bird

## 2022-01-13 NOTE — DISCHARGE SUMMARY
Hospital Medicine Discharge Summary    Patient ID: Olinda Hall      Patient's PCP: Chavez Patrick MD    Admit Date: 1/7/2022     Discharge Date:   01/13/22     Admitting Provider: No admitting provider for patient encounter. Discharge Provider: Kian Saavedra MD     Discharge Diagnoses: Active Hospital Problems    Diagnosis     Small bowel obstruction ([facility] [address] [K56.609]        The patient was seen and examined on day of discharge and this discharge summary is in conjunction with any daily progress note from day of discharge. Hospital Course:  \"44 y. o. male with hx of dm2, gerd, MRDD who presented to [facility] with n/v and constipation and found to have SBO. \"        SBO. Had multiple BMs on 1/11. NG removed. Advanced diet per surgery (baseline diet is pureed solids and honey-thick liquids). He did well.     Hypernatremia. Improved with hypotonic IVFs. Repeat lab pending at the time of this note.      DM2. Adjusted insulin regimen because A1c was 10.5.     Hypothyroidism. Clinically euthyroid. Continue home dose of levothyroxine.     Seizure disorder. Restarted carbamazepine, zonisamide, lamotrigine. Also on gabapentin. Physical Exam Performed:     /63   Pulse 78   Temp 97.7 °F (36.5 °C) (Axillary)   Resp 14   Ht 5' 9\" (1.753 m)   Wt 126 lb (57.2 kg)   SpO2 95%   BMI 18.61 kg/m²       General appearance:  No apparent distress, appears stated age and cooperative. HEENT:  Normal cephalic, atraumatic without obvious deformity. Pupils equal, round, and reactive to light.  Extra ocular muscles intact. Conjunctivae/corneas clear.  hard of hearing. Neck: Supple, with full range of motion. No jugular venous distention. Trachea midline. Respiratory:  Normal respiratory effort. Clear to auscultation, bilaterally without Rales/Wheezes/Rhonchi. Cardiovascular:  Regular rate and rhythm with normal S1/S2 without murmurs, rubs or gallops.   Abdomen: Soft, non-tender, not distended with infrequent bowel sounds. Musculoskeletal:  No clubbing, cyanosis or edema bilaterally.  Full range of motion without deformity. Skin: Skin color, texture, turgor normal.  No rashes or lesions. Neurologic:  blind  Psychiatric:  alert, unable to communicate, does not follow commands  Capillary Refill: Brisk,3 seconds, normal  Peripheral Pulses: +2 palpable, equal bilaterally       Labs: For convenience and continuity at follow-up the following most recent labs are provided:      CBC:    Lab Results   Component Value Date    WBC 6.1 01/12/2022    HGB 14.3 01/12/2022    HCT 42.3 01/12/2022     01/12/2022       Renal:    Lab Results   Component Value Date     01/12/2022    K 3.8 01/12/2022     01/12/2022    CO2 26 01/12/2022    BUN 10 01/12/2022    CREATININE 0.7 01/12/2022    CALCIUM 8.4 01/12/2022    PHOS 1.8 01/09/2022         Significant Diagnostic Studies    Radiology:   FL SMALL BOWEL FOLLOW THROUGH ONLY   Final Result   Abnormally dilated small bowel loops in the left mid abdomen, with a   suspected transition point in the midline as small bowel loops in the right   lower quadrant are smaller in caliber. Contrast reaches colon by 3 hours. Findings are suggestive of partial small bowel obstruction      RECOMMENDATIONS:   Unavailable         XR ACUTE ABD SERIES CHEST 1 VW   Final Result   1. Persistent marked small bowel distension consistent with a high-grade   small-bowel obstruction. 2. No free intraperitoneal air identified. 3. No acute cardiopulmonary process identified. XR ABDOMEN (KUB) (SINGLE AP VIEW)   Final Result   Gastric tube tip just within the body of the stomach with the side port seen   in the distal esophagus. Recommend readjusting the tube tip into the distal   antrum of the stomach for more physiologic positioning. Probable distal partial small bowel obstruction which is unchanged.          XR ABDOMEN (KUB) (SINGLE AP VIEW) Final Result      1. NG tube in place extending into the mid stomach. 2.  Patchy interstitial opacity in both mid lower lung fields is exaggerated   by the poor level of inspiration though suggests pneumonitis or at least   atelectasis and similar to the prior study. XR ABDOMEN (KUB) (SINGLE AP VIEW)   Final Result   Status post gastric tube placement with the tip along the body of the   stomach. The side port appears near the gastric wall. Suggest readjusting   the gastric tube tip into distal stomach for more physiologic positioning. Slight decrease in the amount gastric and bowel dilatation         XR ABDOMEN (2 VIEWS)   Final Result   Findings suspicious for a distal partial small bowel obstruction which may be   slightly less prominent from the prior CT scan. Mild constipation with no obvious colonic dilatation. CT ABDOMEN PELVIS W IV CONTRAST Additional Contrast? None   Final Result   Abnormally dilated stomach and proximal small bowel loops with relative   collapse of distal small bowel loops. , Compatible with small bowel   obstruction. There is a subtle swirled appearance to the mesentery as seen on   image number 98 with a questionable internal hernia on image number 112 in   the midline. Reactive fluid is seen in the pelvis      RECOMMENDATIONS:   Unavailable                Consults:     IP CONSULT TO GENERAL SURGERY  IP CONSULT TO HOSPITALIST    Disposition:  Back to group home     Condition at Discharge: Stable    Discharge Instructions/Follow-up:  Follow up with PCP within 1-2 weeks.        Code Status:  Full Code     Activity: activity as tolerated    Diet: regular diet, with pureed solids and honey-thick liquids      Discharge Medications:     Current Discharge Medication List           Details   insulin glargine (LANTUS) 100 UNIT/ML injection vial Inject 18 Units into the skin every morning  Qty: 10 mL, Refills: 3              Details   !! lamoTRIgine (LAMICTAL) is symptomatic and BS less than 50      aspirin 81 MG chewable tablet Take 81 mg by mouth daily. docusate sodium (COLACE) 100 MG capsule Take 100 mg by mouth 2 times daily. Melatonin 3 MG TABS Take 3 mg by mouth nightly. Acetaminophen (TYLENOL PO) Take  by mouth. 650 po/pr q4hr prn pain/fever       bisacodyl (DULCOLAX) 10 MG suppository Place 10 mg rectally 2 times daily as needed        !! - Potential duplicate medications found. Please discuss with provider. Time Spent on discharge is more than 30 minutes in the examination, evaluation, counseling and review of medications and discharge plan. Signed:    Luis Felipe Regalado MD   1/13/2022      Thank you Meenakshi Zhu MD for the opportunity to be involved in this patient's care. If you have any questions or concerns please feel free to contact me at [phone].

## 2022-01-13 NOTE — PROGRESS NOTES
Writer fed pt dinner and pt ate 100% of pureed chicken, peas, mashed potatoes, pudding and thickened tea. No emesis. Pt waited an hour after dinner before discharging. PIV removed. Writer gave report to Glory Ochoa and all questions answered. Glory Ochoa has discharge paperwork and all of pt's belongings. Pt discharged to group home with Glory Ochoa in stable condition.

## 2022-01-13 NOTE — PROGRESS NOTES
[facility]    PATIENT NAME: Susana Sharma     TODAY'S DATE: 1/13/2022    CHIEF COMPLAINT: none    INTERVAL HISTORY/HPI:    Pt having mult BM, taking full liquids well. OBJECTIVE:  VITALS:  /63   Pulse 78   Temp 97.7 °F (36.5 °C) (Axillary)   Resp 14   Ht 5' 9\" (1.753 m)   Wt 126 lb (57.2 kg)   SpO2 95%   BMI 18.61 kg/m²     INTAKE/OUTPUT:    I/O last 3 completed shifts: In: 4753.4 [P.O.:1330; I.V.:3423.4]  Out: 300 [Urine:300]  I/O this shift:  In: 120 [P.O.:120]  Out: -     CONSTITUTIONAL:  awake and    LUNGS:     ABDOMEN:   , soft, non-distended,       Data:  CBC: Recent Labs     01/12/22  0515   WBC 6.1   HGB 14.3   HCT 42.3        BMP:    Recent Labs     01/10/22  1406 01/12/22  0515   * 147*   K 3.2* 3.8    113*   CO2 29 26   BUN 14 10   CREATININE 0.6* 0.7*   GLUCOSE 200* 164*     Hepatic: No results for input(s): AST, ALT, ALB, BILITOT, ALKPHOS in the last 72 hours. Mag:      Recent Labs     01/10/22  1406   MG 2.30      Phos:   No results for input(s): PHOS in the last 72 hours. INR: No results for input(s): INR in the last 72 hours.     Radiology Review:         ASSESSMENT AND PLAN:  SBO, resolved   - advance to his usual pureed diet level   - ok for d/c back to F from surgical standpoint   - will sign off    Electronically signed by Julio C Vuong MD

## 2022-01-13 NOTE — PROGRESS NOTES
Pt alert and nonverbal. Moves himself around in the bed and turns himself from side to side. Pt feed by staff. Pt tolerated pureed, honey thick lunch and ate all of it (tea; meat, mashed potatoes, carrots, pudding). No emesis. BM this morning. Secure message sent to Dr. Lizzette Cotton: \"FYI: Sodium 136; potassium 3.3; magnesium 1.7\"    Seizure precautions remain in place. [place] remains in pt's room for pt's safety. Will continue to monitor.

## 2022-04-28 ENCOUNTER — APPOINTMENT (OUTPATIENT)
Dept: GENERAL RADIOLOGY | Age: 47
End: 2022-04-28
Payer: MEDICAID

## 2022-04-28 ENCOUNTER — APPOINTMENT (OUTPATIENT)
Dept: CT IMAGING | Age: 47
End: 2022-04-28
Payer: MEDICAID

## 2022-04-28 ENCOUNTER — HOSPITAL ENCOUNTER (EMERGENCY)
Age: 47
Discharge: HOME | End: 2022-04-28
Payer: MEDICAID

## 2022-04-28 VITALS
HEIGHT: 69 IN | SYSTOLIC BLOOD PRESSURE: 142 MMHG | RESPIRATION RATE: 12 BRPM | OXYGEN SATURATION: 97 % | BODY MASS INDEX: 18.66 KG/M2 | TEMPERATURE: 97.6 F | DIASTOLIC BLOOD PRESSURE: 87 MMHG | WEIGHT: 126 LBS | HEART RATE: 74 BPM

## 2022-04-28 DIAGNOSIS — G40.919 BREAKTHROUGH SEIZURE (HCC): Primary | ICD-10-CM

## 2022-04-28 LAB
A/G RATIO: 1.1 (ref 1.1–2.2)
ALBUMIN SERPL-MCNC: 4.2 G/DL (ref 3.4–5)
ALP BLD-CCNC: 228 U/L (ref 40–129)
ALT SERPL-CCNC: 19 U/L (ref 10–40)
ANION GAP SERPL CALCULATED.3IONS-SCNC: 20 MMOL/L (ref 3–16)
AST SERPL-CCNC: 20 U/L (ref 15–37)
BASE EXCESS VENOUS: -1.3 MMOL/L (ref -3–3)
BASOPHILS ABSOLUTE: 0 K/UL (ref 0–0.2)
BASOPHILS RELATIVE PERCENT: 0.4 %
BENZODIAZEPINE SCREEN, URINE: POSITIVE
BETA-HYDROXYBUTYRATE: 0.08 MMOL/L (ref 0–0.27)
BILIRUB SERPL-MCNC: <0.2 MG/DL (ref 0–1)
BILIRUBIN URINE: NEGATIVE
BLOOD, URINE: ABNORMAL
BUN BLDV-MCNC: 20 MG/DL (ref 7–20)
CALCIUM SERPL-MCNC: 9.7 MG/DL (ref 8.3–10.6)
CARBOXYHEMOGLOBIN: 1.4 % (ref 0–1.5)
CHLORIDE BLD-SCNC: 102 MMOL/L (ref 99–110)
CLARITY: CLEAR
CO2: 17 MMOL/L (ref 21–32)
COLOR: YELLOW
CREAT SERPL-MCNC: 1.1 MG/DL (ref 0.9–1.3)
EKG ATRIAL RATE: 76 BPM
EKG DIAGNOSIS: NORMAL
EKG P AXIS: 67 DEGREES
EKG P-R INTERVAL: 152 MS
EKG Q-T INTERVAL: 388 MS
EKG QRS DURATION: 76 MS
EKG QTC CALCULATION (BAZETT): 436 MS
EKG R AXIS: 76 DEGREES
EKG T AXIS: 75 DEGREES
EKG VENTRICULAR RATE: 76 BPM
EOSINOPHILS ABSOLUTE: 0 K/UL (ref 0–0.6)
EOSINOPHILS RELATIVE PERCENT: 0.5 %
GFR AFRICAN AMERICAN: >60
GFR NON-AFRICAN AMERICAN: >60
GLUCOSE BLD-MCNC: 190 MG/DL (ref 70–99)
GLUCOSE URINE: NEGATIVE MG/DL
HCO3 VENOUS: 24.7 MMOL/L (ref 23–29)
HCT VFR BLD CALC: 43.4 % (ref 40.5–52.5)
HEMOGLOBIN: 14.2 G/DL (ref 13.5–17.5)
KETONES, URINE: NEGATIVE MG/DL
LEUKOCYTE ESTERASE, URINE: NEGATIVE
LYMPHOCYTES ABSOLUTE: 1.9 K/UL (ref 1–5.1)
LYMPHOCYTES RELATIVE PERCENT: 27.2 %
Lab: ABNORMAL
MCH RBC QN AUTO: 29.1 PG (ref 26–34)
MCHC RBC AUTO-ENTMCNC: 32.8 G/DL (ref 31–36)
MCV RBC AUTO: 88.8 FL (ref 80–100)
METHEMOGLOBIN VENOUS: 0.2 %
MICROSCOPIC EXAMINATION: YES
MONOCYTES ABSOLUTE: 0.4 K/UL (ref 0–1.3)
MONOCYTES RELATIVE PERCENT: 6.2 %
NEUTROPHILS ABSOLUTE: 4.5 K/UL (ref 1.7–7.7)
NEUTROPHILS RELATIVE PERCENT: 65.7 %
NITRITE, URINE: NEGATIVE
O2 SAT, VEN: 81 %
O2 THERAPY: ABNORMAL
PCO2, VEN: 45.8 MMHG (ref 40–50)
PDW BLD-RTO: 14.4 % (ref 12.4–15.4)
PH UA: 5
PH UA: 5 (ref 5–8)
PH VENOUS: 7.35 (ref 7.35–7.45)
PLATELET # BLD: 191 K/UL (ref 135–450)
PMV BLD AUTO: 7.6 FL (ref 5–10.5)
PO2, VEN: 45.1 MMHG (ref 25–40)
POTASSIUM REFLEX MAGNESIUM: 3.9 MMOL/L (ref 3.5–5.1)
PROTEIN UA: 100 MG/DL
RBC # BLD: 4.89 M/UL (ref 4.2–5.9)
RBC UA: NORMAL /HPF (ref 0–4)
SODIUM BLD-SCNC: 139 MMOL/L (ref 136–145)
SPECIFIC GRAVITY UA: >=1.03 (ref 1–1.03)
TCO2 CALC VENOUS: 26 MMOL/L
TOTAL PROTEIN: 7.9 G/DL (ref 6.4–8.2)
URINE REFLEX TO CULTURE: ABNORMAL
URINE TYPE: ABNORMAL
UROBILINOGEN, URINE: 0.2 E.U./DL
WBC # BLD: 6.9 K/UL (ref 4–11)
WBC UA: NORMAL /HPF (ref 0–5)

## 2022-04-28 PROCEDURE — 80053 COMPREHEN METABOLIC PANEL: CPT

## 2022-04-28 PROCEDURE — 81001 URINALYSIS AUTO W/SCOPE: CPT

## 2022-04-28 PROCEDURE — 85025 COMPLETE CBC W/AUTO DIFF WBC: CPT

## 2022-04-28 PROCEDURE — 80307 DRUG TEST PRSMV CHEM ANLYZR: CPT

## 2022-04-28 PROCEDURE — 93010 ELECTROCARDIOGRAM REPORT: CPT | Performed by: INTERNAL MEDICINE

## 2022-04-28 PROCEDURE — 82803 BLOOD GASES ANY COMBINATION: CPT

## 2022-04-28 PROCEDURE — 70450 CT HEAD/BRAIN W/O DYE: CPT

## 2022-04-28 PROCEDURE — 2580000003 HC RX 258: Performed by: PHYSICIAN ASSISTANT

## 2022-04-28 PROCEDURE — 96361 HYDRATE IV INFUSION ADD-ON: CPT

## 2022-04-28 PROCEDURE — 99285 EMERGENCY DEPT VISIT HI MDM: CPT

## 2022-04-28 PROCEDURE — 71045 X-RAY EXAM CHEST 1 VIEW: CPT

## 2022-04-28 PROCEDURE — 80175 DRUG SCREEN QUAN LAMOTRIGINE: CPT

## 2022-04-28 PROCEDURE — 96360 HYDRATION IV INFUSION INIT: CPT

## 2022-04-28 PROCEDURE — 82010 KETONE BODYS QUAN: CPT

## 2022-04-28 PROCEDURE — 93005 ELECTROCARDIOGRAM TRACING: CPT | Performed by: PHYSICIAN ASSISTANT

## 2022-04-28 RX ORDER — LORAZEPAM 0.5 MG/1
0.5 TABLET ORAL 2 TIMES DAILY
Qty: 6 TABLET | Refills: 0 | Status: SHIPPED | OUTPATIENT
Start: 2022-04-28 | End: 2022-05-01

## 2022-04-28 RX ORDER — 0.9 % SODIUM CHLORIDE 0.9 %
500 INTRAVENOUS SOLUTION INTRAVENOUS ONCE
Status: COMPLETED | OUTPATIENT
Start: 2022-04-28 | End: 2022-04-28

## 2022-04-28 RX ADMIN — SODIUM CHLORIDE 500 ML: 9 INJECTION, SOLUTION INTRAVENOUS at 13:33

## 2022-04-28 ASSESSMENT — PAIN - FUNCTIONAL ASSESSMENT: PAIN_FUNCTIONAL_ASSESSMENT: NONE - DENIES PAIN

## 2022-04-28 NOTE — DISCHARGE INSTR - COC
Continuity of Care Form    Patient Name: Wen Luis   :  1975  MRN:  [id number]    Admit date:  2022  Discharge date:  ***    Code Status Order: Prior   Advance Directives:      Admitting Physician:  No admitting provider for patient encounter.   PCP: Daisy Alford MD    Discharging Nurse: [facility] Unit/Room#:   Discharging Unit Phone Number: ***    Emergency Contact:   Extended Emergency Contact Information  Primary Emergency Contact: Andressa Sharma  Address: [address] [place] [address] Phone: [phone]  Mobile Phone: [phone]  Relation: Legal Guardian  Secondary Emergency Contact: Consuelo Carrasquillo Phone: [phone]  Relation: Aunt/Uncle    Past Surgical History:  Past Surgical History:   Procedure Laterality Date    CHEST TUBE INSERTION      upper lobes bilat; when born    OTHER SURGICAL HISTORY Right 17-16    excision thumb mass       Immunization History:   Immunization History   Administered Date(s) Administered    Influenza Virus Vaccine 10/08/2011    Influenza, Quadv, 6 mo and older, IM, PF (Flulaval, Fluarix) 2018    Pneumococcal Polysaccharide (Ilacjhiwx88) 2012    Tdap (Boostrix, Adacel) 2017       Active Problems:  Patient Active Problem List   Diagnosis Code    Epilepsy ([facility] [address] G40.909    Mental retardation F79    Blindness, legal H54.8    Aspiration pneumonia ([facility] [facility] J69.0    DM2 (diabetes mellitus, type 2) ([facility] [address] E11.9    Sepsis ([facility] [address] A41.9    DM (diabetes mellitus) ([facility] [address] [facility] E11.9    CAP (community acquired pneumonia) J18.9    Infection of thumb L08.9    Hypernatremia E87.0    Acute hypernatremia E87.0    Acute encephalopathy G93.40    Hypokalemia E87.6    Seizure disorder ([address] G40.909    Hyperglycemia R73.9    Abnormal finding on GI tract imaging R93.3    Status epilepticus ([facility] [facility] G40.901    Goals of care, counseling/discussion Z    DNR (do not resuscitate) discussion Z Encounter for palliative care Z51.5    Small bowel obstruction ([facility] [address] K56.609       Isolation/Infection:   Isolation            No Isolation          Patient Infection Status       Infection Onset Added Last Indicated Last Indicated By Review Planned Expiration Resolved Resolved By    None active    Resolved    MRSA  16 Rayna Valentine RN   18 Mame Muñoz RN            Nurse Assessment:  Last Vital Signs: /75   Pulse 85   Temp 97.6 °F (36.4 °C) (Oral)   Resp 20   Ht 5' 9\" (1.753 m)   Wt 126 lb (57.2 kg)   SpO2 93%   BMI 18.61 kg/m²     Last documented pain score (0-10 scale):    Last Weight:   Wt Readings from Last 1 Encounters:   22 126 lb (57.2 kg)     Mental Status:  {IP PT MENTAL STATUS:}    IV Access:  { HUSEYIN IV ACCESS:295450450}    Nursing Mobility/ADLs:  Walking   {CHP DME DHHA:896614733}  Transfer  {P DME ZQJU:156374606}  Bathing  {P DME HCL}  Dressing  {CHP DME TSNJ:562731404}  Toileting  {CHP DME PKLV:299476918}  Feeding  {[facility] DME RLRH:110269430}  Med Admin  {P DME CKWB:157358539}  Med Delivery   { HUSEYIN MED Delivery:250710856}    Wound Care Documentation and Therapy:        Elimination:  Continence: Bowel: {YES / FI:57286}  Bladder: {YES / :93786}  Urinary Catheter: {Urinary Catheter:948782422}   Colostomy/Ileostomy/Ileal Conduit: {YES / :90012}       Date of Last BM: ***  No intake or output data in the 24 hours ending 22 1331  No intake/output data recorded.     Safety Concerns:     [address] Safety Concerns:432921584}    Impairments/Disabilities:      [address] Impairments/Disabilities:057187141}    Nutrition Therapy:  Current Nutrition Therapy:   [address] Diet List:443874602}    Routes of Feeding: {P DME Other Feedings:811034146}  Liquids: {Slp liquid thickness:54663}  Daily Fluid Restriction: {CHP DME Yes amt example:524564329}  Last Modified Barium Swallow with Video (Video Swallowing Test): {Done Not Done BURW:120855428}    Treatments at the Time of Hospital Discharge:   Respiratory Treatments: ***  Oxygen Therapy:  {Therapy; copd oxygen:53328}  Ventilator:    {[facility] Vent FZSQ:349139921}    Rehab Therapies: {THERAPEUTIC INTERVENTION:7667936790}  Weight Bearing Status/Restrictions: {[facility] Weight Bearin}  Other Medical Equipment (for information only, NOT a DME order):  {EQUIPMENT:129103619}  Other Treatments: ***    Patient's personal belongings (please select all that are sent with patient):  {[facility] DME Belongings:263759950}    RN SIGNATURE:  {Esignature:327985369}    CASE MANAGEMENT/SOCIAL WORK SECTION    Inpatient Status Date: ***    Readmission Risk Assessment Score:  Readmission Risk              Risk of Unplanned Readmission:  0           Discharging to Facility/ Agency   Name:   Address:  Phone:  Fax:    Dialysis Facility (if applicable)   Name:  Address:  Dialysis Schedule:  Phone:  Fax:    / signature: {Esignature:845736315}    PHYSICIAN SECTION    Prognosis: {Prognosis:6297732052}    Condition at Discharge: [address] [place] [address] Patient Condition:402798009}    Rehab Potential (if transferring to Rehab): {Prognosis:4290473836}    Recommended Labs or Other Treatments After Discharge: ***    Physician Certification: I certify the above information and transfer of Josefa Jones  is necessary for the continuing treatment of the diagnosis listed and that he requires {Admit to Appropriate Level of Care:29179} for {GREATER/LESS:295061351} 30 days.      Update Admission H&P: {CHP DME Changes in LKEWR:969196506}    PHYSICIAN SIGNATURE:  {Esignature:366362348}

## 2022-04-28 NOTE — ED NOTES
Caregiver at patient bedside. Patient's IV wrapped in coban for security. Patient hx of pulling IV's and does not like to be touched.       Mala Payne RN  04/28/22 4503

## 2022-04-28 NOTE — ED PROVIDER NOTES
Patient seen and evaluated by HUMBERTO. EKG: Normal sinus rhythm with a rate of 76. Normal axis. Normal intervals and durations. QTc within normal limits. No ST-T wave changes are noted. No significant change when compared to previous EKG on September 6, 2020.      Justa Perez DO  04/28/22 1322

## 2022-04-28 NOTE — ED PROVIDER NOTES
[facility]  ED  EMERGENCY DEPARTMENT ENCOUNTER        Pt Name: Jean Claude Mtz  MRN: [id number]  [place] 1975  Date of evaluation: 4/28/2022  Provider: Asiya Cameron PA-C  PCP: Essence Lobo MD  ED Attending: Chandrika Romero      This patient was not seen and evaluated by the attending physician  I have independently evaluated this patient. CHIEF COMPLAINT       Chief Complaint   Patient presents with    Seizures     per squad report patient had some \"cluster\" seizures while at [facility]. pt hx of seizures. Patient given Diastat 10mg rectally prior to arrival (patient's last cluster seizures were almost two years ago) Has not missed any medication doses        HISTORY OF PRESENT ILLNESS   (Location/Symptom, Timing/Onset, Context/Setting, Quality, Duration, Modifying Factors, Severity)  Note limiting factors. Jean Claude Mtz is a 42 y.o. male with a h/o Deaf nonspeaking, not elsewhere classifiable, Kyphoscoliosis and scoliosis, Microcephalus ([address] Other transient org mental, Type I (juvenile type) diabetes mellitus without mention of complication, not stated as uncontrolled, Unqualified visual loss, both eyes, Unspecified epilepsy, and Unspecified hypothyroidism. Presents from SNF for evaluation of seizure activity. 3x episodes, 12 mins of convulsion activity. Diastat 10mg administered PTA. Not missed medication dosing. No recent breakthru seizures. Pt with SNF aid at bedside, provide history. Nursing Notes were all reviewed and agreed with or any disagreements were addressed  in the HPI. REVIEW OF SYSTEMS  (2-9 systems for level 4, 10 or more for level 5)     Review of Systems   Unable to perform ROS: Other       Positivesand Pertinent negatives as per HPI. Except as noted above in the ROS, all other systems were reviewed and negative.        PAST MEDICAL HISTORY     Past Medical History:   Diagnosis Date    Acne     Allergic rhinitis     Blindness, legal     Constipation     Dehydration     Dermatitis     Diabetes mellitus ([facility] [address] [address]     Epilepsy (HCC)     GERD (gastroesophageal reflux disease)     GERD (gastroesophageal reflux disease)     Hearing loss     Hypokalemia     Hypothyroidism     Impulse control disorder     Incontinence     Insomnia     Mental retardation     Mental retardation, profound (I.Q. < 20)     Microcephaly (HCC)     MRSA (methicillin resistant staph aureus) culture positive 1/15/16    hand    Non-verbal learning disorder     PAD (peripheral artery disease) ([facility] [address]     Pneumonia     Scoliosis     Thyroid disease          SURGICAL HISTORY       Past Surgical History:   Procedure Laterality Date    CHEST TUBE INSERTION      upper lobes bilat; when born   [facility] OTHER SURGICAL HISTORY Right 1-7-16    excision thumb mass         CURRENT MEDICATIONS       Discharge Medication List as of 4/28/2022  5:21 PM      CONTINUE these medications which have NOT CHANGED    Details   insulin glargine (LANTUS) 100 UNIT/ML injection vial Inject 18 Units into the skin every morning, Disp-10 mL, R-3NO PRINT      !! lamoTRIgine (LAMICTAL) 200 MG tablet Take 2 tablets by mouth every morning, Disp-30 tablet, R-3Print      !! lamoTRIgine (LAMICTAL) 150 MG tablet Take 3 tablets by mouth nightly, Disp-30 tablet, R-3Print      zonisamide (ZONEGRAN) 100 MG capsule Take 4 capsules by mouth daily, Disp-30 capsule, R-3Print      Benzoyl Peroxide 5 % LOTN Apply topically as needed (Acne)Historical Med      acetaminophen (TYLENOL) 650 MG suppository Place 650 mg rectally every 4 hours as needed for FeverHistorical Med      Cholecalciferol 400 UNIT TABS tablet Take 400 Units by mouth dailyHistorical Med      carBAMazepine (CARBATROL) 200 MG CR capsule Take 2 capsules by mouth every 12 hours, Disp-10 capsule, R-0      diazepam (DIASTAT ACUDIAL) 10 MG GEL Place 10 mg rectally as needed Inject 10mg rectally as needed for seizure lasting longer than 3 minutes. , Disp-1 each, R-0      gabapentin (NEURONTIN) 300 MG capsule Take 1 capsule by mouth 4 times daily, Disp-10 capsule, R-0      ketoconazole (NIZORAL) 2 % shampoo Apply topically once a week , Topical, Historical Med      levothyroxine (SYNTHROID) 75 MCG tablet Take 75 mcg by mouth Daily      insulin aspart (NOVOLOG) 100 UNIT/ML injection pen Inject into the skin 3 times daily (before meals) Sliding scale  0700,1200,1600  Less than 200 -None  200-299  4 units  300-399  5 units  400-499  6   500 and above, call MDHistorical Med      glucagon 1 MG injection Infuse 1 kit intravenously once If pt is unresponsive and BS less than 50      glucose (GLUTOSE) 40 % GEL Take 15 g by mouth See Admin Instructions if pt is symptomatic and BS less than 50      omeprazole (PRILOSEC) 20 MG capsule Take 20 mg by mouth daily. sertraline (ZOLOFT) 50 MG tablet Take 50 mg by mouth nightly. simethicone (MYLICON) 80 MG chewable tablet Take 80 mg by mouth three times daily. Sodium Phosphates (FLEET ENEMA) 7-19 GM/118ML ENEM Place 118 mLs rectally as needed. Insert 1 rectally as needed for constipation if no BM in 4 Days (x1)       aspirin 81 MG chewable tablet Take 81 mg by mouth daily. docusate sodium (COLACE) 100 MG capsule Take 100 mg by mouth 2 times daily. Melatonin 3 MG TABS Take 3 mg by mouth nightly. polyethylene glycol (GLYCOLAX) powder Take 17 g by mouth every other day. Acetaminophen (TYLENOL PO) Take  by mouth. 650 po/pr q4hr prn pain/fever       bisacodyl (DULCOLAX) 10 MG suppository Place 10 mg rectally 2 times daily as needed Historical Med       !! - Potential duplicate medications found. Please discuss with provider. ALLERGIES     Polymyxin b and Polytrim [polymyxin b-trimethoprim]    FAMILY HISTORY     History reviewed. No pertinent family history.       SOCIAL HISTORY       Social History     Socioeconomic History    Marital status: Single     Spouse name: None    Number of children: None    Years of education: None    Highest education level: None   Occupational History    None   Tobacco Use    Smoking status: Never Smoker    Smokeless tobacco: Never Used   Vaping Use    Vaping Use: Never used   Substance and Sexual Activity    Alcohol use: No    Drug use: No    Sexual activity: Not Currently   Other Topics Concern    None   Social History Narrative    None     Social Determinants of Health     Financial Resource Strain:     Difficulty of Paying Living Expenses: Not on file   Food Insecurity:     Worried About Running Out of Food in the Last Year: Not on file    Mayra of Food in the Last Year: Not on file   Transportation Needs:     Lack of Transportation (Medical): Not on file    Lack of Transportation (Non-Medical):  Not on file   Physical Activity:     Days of Exercise per Week: Not on file    Minutes of Exercise per Session: Not on file   Stress:     Feeling of Stress : Not on file   Social Connections:     Frequency of Communication with Friends and Family: Not on file    Frequency of Social Gatherings with Friends and Family: Not on file    Attends Islam Services: Not on file    Active Member of [address] [place] [address] or Organizations: Not on file    Attends Club or Organization Meetings: Not on file    Marital Status: Not on file   Intimate Partner Violence:     Fear of Current or Ex-Partner: Not on file    Emotionally Abused: Not on file    Physically Abused: Not on file    Sexually Abused: Not on file   Housing Stability:     Unable to Pay for Housing in the Last Year: Not on file    Number of [place] in the Last Year: Not on file    Unstable Housing in the Last Year: Not on file       SCREENINGS     NIH Score       [facility]     Heart Score         PHYSICAL EXAM    (up to 7 for level 4, 8 ormore for level 5)     ED Triage Vitals [04/28/22 1238]   BP Temp Temp Source Pulse Resp SpO2 Height Weight   104/75 97.6 °F (36.4 °C) Oral 85 20 93 % 5' 9\" (1.753 m) 126 lb (57.2 kg)       Physical Exam  Vitals and nursing note reviewed. Constitutional:       Appearance: He is well-developed. He is not diaphoretic. HENT:      Head: Normocephalic. Comments: Microcephaly      Nose: Nose normal.      Mouth/Throat:      Pharynx: No oropharyngeal exudate. Eyes:      General:         Right eye: No discharge. Left eye: No discharge. Conjunctiva/sclera: Conjunctivae normal.      Pupils: Pupils are equal, round, and reactive to light. Cardiovascular:      Rate and Rhythm: Normal rate and regular rhythm. Heart sounds: Normal heart sounds. No murmur heard. No friction rub. No gallop. Pulmonary:      Effort: Pulmonary effort is normal. No respiratory distress. Breath sounds: Normal breath sounds. No wheezing. Abdominal:      General: Bowel sounds are normal. There is no distension. Palpations: Abdomen is soft. Tenderness: There is no abdominal tenderness. Musculoskeletal:         General: Normal range of motion. Cervical back: Normal range of motion. Skin:     General: Skin is warm and dry. Neurological:      Mental Status: He is alert. Mental status is at baseline.            DIAGNOSTIC RESULTS   LABS:    Labs Reviewed   COMPREHENSIVE METABOLIC PANEL W/ REFLEX TO MG FOR LOW K - Abnormal; Notable for the following components:       Result Value    CO2 17 (*)     Anion Gap 20 (*)     Glucose 190 (*)     Alkaline Phosphatase 228 (*)     All other components within normal limits   URINALYSIS WITH REFLEX TO CULTURE - Abnormal; Notable for the following components:    Blood, Urine TRACE-INTACT (*)     Protein,  (*)     All other components within normal limits   BLOOD GAS, VENOUS - Abnormal; Notable for the following components:    pH, Miguel A 7.349 (*)     pO2, Miguel A 45.1 (*)     All other components within normal limits   BENZODIAZEPINE, QUALITATIVE, URINE - Abnormal; Notable for the following components: Benzodiazepine Screen, Urine POSITIVE (*)     All other components within normal limits   CBC WITH AUTO DIFFERENTIAL   MICROSCOPIC URINALYSIS   CARBAMAZEPINE LEVEL, TOTAL   LAMOTRIGINE LEVEL   BETA-HYDROXYBUTYRATE       All other labs were within normal range or notreturned as of this dictation. EKG: All EKG's are interpreted by the Emergency Department Physician who either signs or Co-signs this chart in the absence of a cardiologist.  Please see their note for interpretation of EKG. RADIOLOGY:         Interpretation per the Radiologist below, if available at the time of this note:    CT Head WO Contrast   Final Result   No acute intracranial abnormality. Posterior left lens dislocation of undetermined chronicity. RECOMMENDATIONS:   Unavailable         XR CHEST PORTABLE   Final Result   No acute process. CONSULTS:  IP CONSULT TO NEUROLOGY      EMERGENCY DEPARTMENT COURSE and DIFFERENTIAL DIAGNOSIS/MDM:   Vitals:    Vitals:    04/28/22 1238 04/28/22 1530 04/28/22 1717   BP: 104/75 (!) 152/95 (!) 142/87   Pulse: 85 79 74   Resp: 20 10 12   Temp: 97.6 °F (36.4 °C)     TempSrc: Oral     SpO2: 93% 98% 97%   Weight: 126 lb (57.2 kg)     Height: 5' 9\" (1.753 m)         Patient was given the following medications:  Medications   0.9 % sodium chloride bolus (0 mLs IntraVENous Stopped 4/28/22 1558)         Afebrile, stable, patient presents to the ED for evaluation. Seen in conjunction with attending ED provider who agrees with assessment and plan. Patients SPO2 is 93% on room air they are not hypoxic. Patient follows with Dr. Antwon Talbert at [facility] neurology. Urinalysis shows no signs of acute infection however proteinuria hematuria. No leukocytosis or anemia. Patient's elevated glucose at 190. CO2 and anion gap. Discussed this with my attending provider who advised likely secondary to seizure activity.   CT of the head which shows no acute intracranial abnormality posterior left lens dislocation which is consistent with patient's baseline. Called and spoke with the patient's neurologist.  He advised to provide the patient with a prescription for Ativan 0.5mg twice daily for the for the next 3 days. And have his skilled nursing facility contact their office to set up close follow-up. All questions are answered. Indications for return to the ED are discussed. Patient is advised if any new or worsening symptoms arise they should immediately return to the emergency room. Follow-up with primary care in 1-2 days. The patient tolerated their visit well. The patient and / or the family were informed of the results of any tests, a time was given to answer questions, a plan was proposed and they agreed Jase Yepez. FINAL IMPRESSION      1. Breakthrough seizure [facility])          DISPOSITION/PLAN   DISPOSITION Decision To Discharge 04/28/2022 05:09:11 PM      PATIENT REFERRED TO:  Trisha Salas, [address] [id number]    Call   Call his office to set up a close follow up appt please      DISCHARGE MEDICATIONS:  Discharge Medication List as of 4/28/2022  5:21 PM      START taking these medications    Details   LORazepam (ATIVAN) 0.5 MG tablet Take 1 tablet by mouth 2 times daily for 3 days. , Disp-6 tablet, R-0Print             DISCONTINUED MEDICATIONS:  Discharge Medication List as of 4/28/2022  5:21 PM                 Pt was seen during the [place] 19 pandemic. Appropriate PPE worn by ME during patient encounters. Pt seen during a time with constrained hospital bed capacity and other potential inpatient and outpatient resources were constrained due to the viral pandemic.    Please note that portions of this note were completed with a voice recognition program.  Efforts were made to edit the dictations but occasionally words are mis-transcribed.)    Jake Denton PA-C (electronically signed) Brayan Smith PA-C  04/28/22 1953

## 2022-04-30 LAB — LAMOTRIGINE LEVEL: 11.1 UG/ML (ref 3–15)

## 2022-09-14 PROCEDURE — 12011 RPR F/E/E/N/L/M 2.5 CM/<: CPT

## 2022-09-14 PROCEDURE — 99282 EMERGENCY DEPT VISIT SF MDM: CPT

## 2022-09-14 ASSESSMENT — PAIN - FUNCTIONAL ASSESSMENT: PAIN_FUNCTIONAL_ASSESSMENT: NONE - DENIES PAIN

## 2022-09-15 ENCOUNTER — HOSPITAL ENCOUNTER (EMERGENCY)
Age: 47
Discharge: HOME | End: 2022-09-15
Payer: MEDICAID

## 2022-09-15 VITALS
TEMPERATURE: 99.1 F | HEIGHT: 69 IN | BODY MASS INDEX: 24.44 KG/M2 | HEART RATE: 96 BPM | WEIGHT: 165 LBS | SYSTOLIC BLOOD PRESSURE: 119 MMHG | DIASTOLIC BLOOD PRESSURE: 79 MMHG | OXYGEN SATURATION: 99 % | RESPIRATION RATE: 18 BRPM

## 2022-09-15 DIAGNOSIS — S01.81XA FACIAL LACERATION, INITIAL ENCOUNTER: Primary | ICD-10-CM
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
## 2022-09-15 ASSESSMENT — PAIN - FUNCTIONAL ASSESSMENT: PAIN_FUNCTIONAL_ASSESSMENT: NONE - DENIES PAIN

## 2022-09-15 NOTE — ED PROVIDER NOTES
[facility] Emergency Department    CHIEF COMPLAINT  Fall (Pt resides at group home. Pt had witnessed fall in shower from shower chair to the ground, sustained laceration to right eyebrow, abrasion to right cheek, and right shoulder. No LOC. ) and Laceration      SHARED SERVICE VISIT   Evaluated by HUMBERTO. My supervising physician was available for consultation. HISTORY OF PRESENT ILLNESS  Popeye Helms is a 41 y.o. male who presents to the ED complaining of fall. The patient currently resides in a group home and is here with his caregiver. The patient was in his shower chair when he rocked forward falling in the shower and hitting his head on the side of the tub. There is no loss of consciousness. He does have an abrasion to his right eyebrow. The patient is not on a blood thinner. The caregiver that is currently with him was not there at the time of the accident. She does state that he is acting normally and has not had any bouts of vomiting. He does have a large knot above his right eyebrow. He also has a large knot above his left eyebrow which apparently is chronic due to previous head injuries. The patient is severe MR and nonverbal.  He apparently does not do well with scans and would require sedation. He is under 24-hour care. They report his tetanus is up-to-date. No other complaints, modifying factors or associated symptoms. Nursing notes reviewed.    Past Medical History:   Diagnosis Date    Acne     Allergic rhinitis     Blindness, legal     Constipation     Dehydration     Dermatitis     Diabetes mellitus (HCC)     Epilepsy ([facility] [address]     GERD (gastroesophageal reflux disease)     GERD (gastroesophageal reflux disease)     Hearing loss     Hypokalemia     Hypothyroidism     Impulse control disorder     Incontinence     Insomnia     Mental retardation     Mental retardation, profound (I.Q. < 20)     Microcephaly ([facility])     MRSA (methicillin resistant staph Units by mouth daily      carBAMazepine (CARBATROL) 200 MG CR capsule Take 2 capsules by mouth every 12 hours 10 capsule 0    diazepam (DIASTAT ACUDIAL) 10 MG GEL Place 10 mg rectally as needed Inject 10mg rectally as needed for seizure lasting longer than 3 minutes. 1 each 0    gabapentin (NEURONTIN) 300 MG capsule Take 1 capsule by mouth 4 times daily 10 capsule 0    ketoconazole (NIZORAL) 2 % shampoo Apply topically once a week       levothyroxine (SYNTHROID) 75 MCG tablet Take 75 mcg by mouth Daily      insulin aspart (NOVOLOG) 100 UNIT/ML injection pen Inject into the skin 3 times daily (before meals) Sliding scale  0700,1200,1600  Less than 200 -None  200-299  4 units  300-399  5 units  400-499  6   500 and above, call MD      glucagon 1 MG injection Infuse 1 kit intravenously once If pt is unresponsive and BS less than 50      glucose (GLUTOSE) 40 % GEL Take 15 g by mouth See Admin Instructions if pt is symptomatic and BS less than 50      omeprazole (PRILOSEC) 20 MG capsule Take 20 mg by mouth daily. sertraline (ZOLOFT) 50 MG tablet Take 50 mg by mouth nightly. simethicone (MYLICON) 80 MG chewable tablet Take 80 mg by mouth three times daily. Sodium Phosphates (FLEET ENEMA) 7-19 GM/118ML ENEM Place 118 mLs rectally as needed. Insert 1 rectally as needed for constipation if no BM in 4 Days (x1)       aspirin 81 MG chewable tablet Take 81 mg by mouth daily. docusate sodium (COLACE) 100 MG capsule Take 100 mg by mouth 2 times daily. Melatonin 3 MG TABS Take 3 mg by mouth nightly. polyethylene glycol (GLYCOLAX) powder Take 17 g by mouth every other day. Acetaminophen (TYLENOL PO) Take  by mouth.  650 po/pr q4hr prn pain/fever       bisacodyl (DULCOLAX) 10 MG suppository Place 10 mg rectally 2 times daily as needed        Allergies   Allergen Reactions    Polymyxin B Other (See Comments)     Unknown reaction    Polytrim [Polymyxin B-Trimethoprim]        REVIEW OF in the emergency department today after a fall. Physical exam reveals a large hematoma with a small laceration to the right eyebrow. No signs of basilar skull fracture. The patient is nonverbal however caregiver is at bedside and reports the patient is at his baseline acting appropriately. She reports that the patient does not do well with a CT scan and typically requires sedation. She does not want the patient sedated today. He is currently living in a group facility with 24-hour care. Given that the caregiver is adamant that the patient is acting appropriately without any red flag symptoms such as vomiting or loss of consciousness we will hold off on imaging at this time. Wound was cleaned by nursing staff and it was repaired using 4 simple interrupted sutures as noted above. The caregiver was given strict return precautions in addition to wound care and monitoring for any signs or symptoms of infection. She verbalized understanding and will follow up with primary care physician for suture removal in 3 to 7 days. Risk management discussed and shared decision making had with patient and/or surrogate. All questions were answered. Patient will return to ED for new/worsening symptoms. MDM    I estimate there is LOW risk for CELLULITIS, COMPARTMENT SYNDROME, NECROTIZING FASCIITIS, TENDON OR NEUROVASCULAR INJURY, or FOREIGN BODY, thus I consider the discharge disposition reasonable. Also, there is no evidence or peritonitis, sepsis, or toxicity. [facility] and I have discussed the diagnosis and risks, and we agree with discharging home to follow-up with their primary doctor. We also discussed returning to the Emergency Department immediately if new or worsening symptoms occur. We have discussed the symptoms which are most concerning (e.g., changing or worsening pain, fever, numbness, weakness, cool or painful digits) that necessitate immediate return. Final Impression    1.  Facial laceration,

## 2022-09-15 NOTE — ED NOTES
Completed wound care on pt's facial laceration. Laceration was cleaned with chlorhexidine, gauze pads, and normal saline. Laceration was appropriately scrubbed out and irrigated. Pt tolerated well.        Yousuf Mc  09/15/22 [id number]

## 2022-09-22 ENCOUNTER — APPOINTMENT (OUTPATIENT)
Dept: GENERAL RADIOLOGY | Age: 47
End: 2022-09-22
Payer: MEDICAID

## 2022-09-22 ENCOUNTER — HOSPITAL ENCOUNTER (EMERGENCY)
Age: 47
Discharge: NURSING, REHAB OR LONG-TERM CARE | End: 2022-09-22
Payer: MEDICAID

## 2022-09-22 ENCOUNTER — APPOINTMENT (OUTPATIENT)
Dept: VASCULAR LAB | Age: 47
End: 2022-09-22
Payer: MEDICAID

## 2022-09-22 VITALS
RESPIRATION RATE: 18 BRPM | TEMPERATURE: 96.7 F | SYSTOLIC BLOOD PRESSURE: 96 MMHG | BODY MASS INDEX: 24.37 KG/M2 | HEART RATE: 68 BPM | WEIGHT: 165 LBS | OXYGEN SATURATION: 100 % | DIASTOLIC BLOOD PRESSURE: 66 MMHG

## 2022-09-22 DIAGNOSIS — Z48.02 VISIT FOR SUTURE REMOVAL: ICD-10-CM

## 2022-09-22 DIAGNOSIS — U07.1 COVID-19: Primary | ICD-10-CM

## 2022-09-22 LAB
A/G RATIO: 0.9 (ref 1.1–2.2)
ALBUMIN SERPL-MCNC: 3.1 G/DL (ref 3.4–5)
ALP BLD-CCNC: 149 U/L (ref 40–129)
ALT SERPL-CCNC: 49 U/L (ref 10–40)
ANION GAP SERPL CALCULATED.3IONS-SCNC: 8 MMOL/L (ref 3–16)
AST SERPL-CCNC: 94 U/L (ref 15–37)
BASOPHILS ABSOLUTE: 0 K/UL (ref 0–0.2)
BASOPHILS RELATIVE PERCENT: 0.5 %
BILIRUB SERPL-MCNC: 0.4 MG/DL (ref 0–1)
BUN BLDV-MCNC: 14 MG/DL (ref 7–20)
CALCIUM SERPL-MCNC: 8.4 MG/DL (ref 8.3–10.6)
CHLORIDE BLD-SCNC: 103 MMOL/L (ref 99–110)
CO2: 26 MMOL/L (ref 21–32)
CREAT SERPL-MCNC: 1 MG/DL (ref 0.9–1.3)
EKG ATRIAL RATE: 70 BPM
EKG ATRIAL RATE: 73 BPM
EKG DIAGNOSIS: NORMAL
EKG DIAGNOSIS: NORMAL
EKG P AXIS: 144 DEGREES
EKG P AXIS: 58 DEGREES
EKG P-R INTERVAL: 150 MS
EKG P-R INTERVAL: 150 MS
EKG Q-T INTERVAL: 406 MS
EKG Q-T INTERVAL: 414 MS
EKG QRS DURATION: 78 MS
EKG QRS DURATION: 92 MS
EKG QTC CALCULATION (BAZETT): 438 MS
EKG QTC CALCULATION (BAZETT): 456 MS
EKG R AXIS: 150 DEGREES
EKG R AXIS: 76 DEGREES
EKG T AXIS: 144 DEGREES
EKG T AXIS: 66 DEGREES
EKG VENTRICULAR RATE: 70 BPM
EKG VENTRICULAR RATE: 73 BPM
EOSINOPHILS ABSOLUTE: 0 K/UL (ref 0–0.6)
EOSINOPHILS RELATIVE PERCENT: 0.5 %
GFR AFRICAN AMERICAN: >60
GFR NON-AFRICAN AMERICAN: >60
GLUCOSE BLD-MCNC: 180 MG/DL (ref 70–99)
GLUCOSE BLD-MCNC: 225 MG/DL (ref 70–99)
HCT VFR BLD CALC: 35.9 % (ref 40.5–52.5)
HEMOGLOBIN: 11.6 G/DL (ref 13.5–17.5)
LACTIC ACID, SEPSIS: 1.1 MMOL/L (ref 0.4–1.9)
LYMPHOCYTES ABSOLUTE: 2 K/UL (ref 1–5.1)
LYMPHOCYTES RELATIVE PERCENT: 52.7 %
MCH RBC QN AUTO: 28.8 PG (ref 26–34)
MCHC RBC AUTO-ENTMCNC: 32.4 G/DL (ref 31–36)
MCV RBC AUTO: 89 FL (ref 80–100)
MONOCYTES ABSOLUTE: 0.3 K/UL (ref 0–1.3)
MONOCYTES RELATIVE PERCENT: 8.4 %
NEUTROPHILS ABSOLUTE: 1.5 K/UL (ref 1.7–7.7)
NEUTROPHILS RELATIVE PERCENT: 37.9 %
PDW BLD-RTO: 14.9 % (ref 12.4–15.4)
PERFORMED ON: ABNORMAL
PLATELET # BLD: 152 K/UL (ref 135–450)
PMV BLD AUTO: 8.1 FL (ref 5–10.5)
POTASSIUM SERPL-SCNC: 4 MMOL/L (ref 3.5–5.1)
RBC # BLD: 4.04 M/UL (ref 4.2–5.9)
SODIUM BLD-SCNC: 137 MMOL/L (ref 136–145)
TOTAL PROTEIN: 6.7 G/DL (ref 6.4–8.2)
TROPONIN: <0.01 NG/ML
WBC # BLD: 3.9 K/UL (ref 4–11)

## 2022-09-22 PROCEDURE — 80053 COMPREHEN METABOLIC PANEL: CPT

## 2022-09-22 PROCEDURE — 93005 ELECTROCARDIOGRAM TRACING: CPT | Performed by: NURSE PRACTITIONER

## 2022-09-22 PROCEDURE — 71045 X-RAY EXAM CHEST 1 VIEW: CPT

## 2022-09-22 PROCEDURE — 83605 ASSAY OF LACTIC ACID: CPT

## 2022-09-22 PROCEDURE — 99285 EMERGENCY DEPT VISIT HI MDM: CPT

## 2022-09-22 PROCEDURE — 85025 COMPLETE CBC W/AUTO DIFF WBC: CPT

## 2022-09-22 PROCEDURE — 84484 ASSAY OF TROPONIN QUANT: CPT

## 2022-09-22 PROCEDURE — 93970 EXTREMITY STUDY: CPT

## 2022-09-22 NOTE — ED PROVIDER NOTES
level: Not on file   Occupational History    Not on file   Tobacco Use    Smoking status: Never    Smokeless tobacco: Never   Vaping Use    Vaping Use: Never used   Substance and Sexual Activity    Alcohol use: No    Drug use: No    Sexual activity: Not Currently   Other Topics Concern    Not on file   Social History Narrative    Not on file     Social Determinants of Health     Financial Resource Strain: Not on file   Food Insecurity: Not on file   Transportation Needs: Not on file   Physical Activity: Not on file   Stress: Not on file   Social Connections: Not on file   Intimate Partner Violence: Not on file   Housing Stability: Not on file     No current facility-administered medications for this encounter. Current Outpatient Medications   Medication Sig Dispense Refill    insulin glargine (LANTUS) 100 UNIT/ML injection vial Inject 18 Units into the skin every morning 10 mL 3    lamoTRIgine (LAMICTAL) 200 MG tablet Take 2 tablets by mouth every morning 30 tablet 3    lamoTRIgine (LAMICTAL) 150 MG tablet Take 3 tablets by mouth nightly 30 tablet 3    zonisamide (ZONEGRAN) 100 MG capsule Take 4 capsules by mouth daily 30 capsule 3    Benzoyl Peroxide 5 % LOTN Apply topically as needed (Acne)      acetaminophen (TYLENOL) 650 MG suppository Place 650 mg rectally every 4 hours as needed for Fever      Cholecalciferol 400 UNIT TABS tablet Take 400 Units by mouth daily      carBAMazepine (CARBATROL) 200 MG CR capsule Take 2 capsules by mouth every 12 hours 10 capsule 0    diazepam (DIASTAT ACUDIAL) 10 MG GEL Place 10 mg rectally as needed Inject 10mg rectally as needed for seizure lasting longer than 3 minutes.  1 each 0    gabapentin (NEURONTIN) 300 MG capsule Take 1 capsule by mouth 4 times daily 10 capsule 0    ketoconazole (NIZORAL) 2 % shampoo Apply topically once a week       levothyroxine (SYNTHROID) 75 MCG tablet Take 75 mcg by mouth Daily      insulin aspart (NOVOLOG) 100 UNIT/ML injection pen Inject into the skin 3 times daily (before meals) Sliding scale  0700,1200,1600  Less than 200 -None  200-299  4 units  300-399  5 units  400-499  6   500 and above, call MD      glucagon 1 MG injection Infuse 1 kit intravenously once If pt is unresponsive and BS less than 50      glucose (GLUTOSE) 40 % GEL Take 15 g by mouth See Admin Instructions if pt is symptomatic and BS less than 50      omeprazole (PRILOSEC) 20 MG capsule Take 20 mg by mouth daily. sertraline (ZOLOFT) 50 MG tablet Take 50 mg by mouth nightly. simethicone (MYLICON) 80 MG chewable tablet Take 80 mg by mouth three times daily. Sodium Phosphates (FLEET ENEMA) 7-19 GM/118ML ENEM Place 118 mLs rectally as needed. Insert 1 rectally as needed for constipation if no BM in 4 Days (x1)       aspirin 81 MG chewable tablet Take 81 mg by mouth daily. docusate sodium (COLACE) 100 MG capsule Take 100 mg by mouth 2 times daily. Melatonin 3 MG TABS Take 3 mg by mouth nightly. polyethylene glycol (GLYCOLAX) powder Take 17 g by mouth every other day. Acetaminophen (TYLENOL PO) Take  by mouth. 650 po/pr q4hr prn pain/fever       bisacodyl (DULCOLAX) 10 MG suppository Place 10 mg rectally 2 times daily as needed        Allergies   Allergen Reactions    Polymyxin B Other (See Comments)     Unknown reaction    Polytrim [Polymyxin B-Trimethoprim]        REVIEW OF SYSTEMS  10 systems reviewed, pertinent positives per HPI otherwise noted to be negative    PHYSICAL EXAM  BP 96/66   Pulse 68   Temp (!) 96.7 °F (35.9 °C) (Axillary)   Resp 18   Wt 165 lb (74.8 kg)   SpO2 100%   BMI 24.37 kg/m²   GENERAL APPEARANCE: Awake and alert. Cooperative. No acute distress. Vital signs are stable. Well appearing and non toxic. HEAD: Normocephalic. Atraumatic. EYES: PERRL. EOM's grossly intact. ENT: Mucous membranes are moist.   NECK: Supple. Normal ROM. HEART: RRR. Distal pulses are equal and intact. Cap refill less than 2 seconds. LUNGS: Respirations unlabored. CTAB. Good air exchange. Speaking comfortably in full sentences. No wheezing, rhonchi, rales, stridor. ABDOMEN: Soft. Non-distended. Non-tender. No guarding or rebound. EXTREMITIES: Mild bilateral upper extremity edema nonpitting. Moves all extremities equally. All extremities neurovascularly intact. SKIN: Warm and dry. No acute rashes. NEUROLOGICAL: Nonverbal.  History of mental delay. No gross facial drooping. Strength 5/5, sensation intact. PSYCHIATRIC: Normal mood and affect. SCREENINGS       RADIOLOGY  XR CHEST PORTABLE    Result Date: 9/22/2022  EXAMINATION: ONE XRAY VIEW OF THE CHEST 9/22/2022 12:19 pm COMPARISON: Chest x-ray dated 28 April 2022 HISTORY: ORDERING SYSTEM PROVIDED HISTORY: recent covid infection TECHNOLOGIST PROVIDED HISTORY: Reason for exam:->recent covid infection Reason for Exam: recent covid infection FINDINGS: Patchy airspace opacities in the left lower lobe. No pneumothorax or pleural effusion. Normal cardiomediastinal silhouette     Patchy airspace opacities in the left lower lobe. This could represent residual pneumonia versus atelectasis versus scarring. Correlate clinically     VL Extremity Venous Bilateral    Result Date: 9/22/2022  Vascular Upper Extremities Veins Procedure -- PRELIMINARY SONOGRAPHER REPORT --   Demographics   Patient Name       SHIRIN DALEY   Date of Study      09/22/2022         Gender              Male   Patient Number     [id number]         Date of Birth       1975   Visit Number       [id number]          Age                 41 year(s)   Accession Number   [id number]         Room Number         01   Corporate ID       [id number]            Sonographer         Melvin Mauro RDMS, RVT   Ordering Physician Floyd Diana     Interpreting        [facility]                     APRN               Physician           Readers  Procedure Type of Study:   Veins:Upper Extremities Veins, VASC EXTREMITY VENOUS DUPLEX BILATERAL. Tech Comments Right Technically difficult exam due to patients intolerance to exam and inability to position correctly. The axillary vein, basilic vein and cephalic vein were unable to be visualized due to limitations. Within the limits of this exam, there is no evidence of deep or superficial venous thrombosis involving the right upper extremity in vessels imaged. Left The cephalic vein and basilic vein were unable to be visualized due to limitations. Within the limits of this exam, there is no evidence of deep or superficial venous thrombosis involving the left upper extremity. There is no previous exam for comparison. ED COURSE/MDM  Patient seen and evaluated. Old records reviewed. Diagnostic testing reviewed and results discussed. I have independently evaluated this patient based upon my scope of practice. Supervising physician was in the department for consultation as needed. Randi Zamora presented to the ED today with above noted complaints. Upon arrival to emergency department vital signs are stable. Nontoxic appearance. No respiratory distress. Oxygen saturation 97% on room air. Chest x-ray obtained and shows patchy airspace opacities in the left lower lobe. This could represent residual pneumonia versus atelectasis for scarring. Correlate clinically. Patient monitored for several hours in the emergency department no return of hypoxia. Tolerated visit well. Blood work unremarkable no leukocytosis or bandemia. Venous Doppler of the upper extremities negative for DVT. 4 sutures were placed in right eyebrow on September 15 here in the emergency department the sutures were removed during today's visit. Patient discharged back to group facility in stable condition. Caregiver at bedside.           At this point I do not feel the patient requires further work up and it is reasonable to discharge the patient. A discussion was had with the patient and/or their surrogate regarding diagnosis, diagnostic testing results, treatment/ plan of care, and follow up. There was shared decision-making between myself as well as the patient and/or their surrogate and we are all in agreement with discharge home. There was an opportunity for questions and all questions were answered to the best of my ability and to the satisfaction of the patient and/or patient family. Patient will follow up with pcp for further evaluation/treatment. The patient was given strict return precautions as we discussed symptoms that would necessitate return to the ED. Patient will return to ED for new/worsening symptoms. The patient verbalized their understanding and agreement with the above plan. Please refer to AVS for further details regarding discharge instructions.       Results for orders placed or performed during the hospital encounter of 09/22/22   CBC with Auto Differential   Result Value Ref Range    WBC 3.9 (L) 4.0 - 11.0 K/uL    RBC 4.04 (L) 4.20 - 5.90 M/uL    Hemoglobin 11.6 (L) 13.5 - 17.5 g/dL    Hematocrit 35.9 (L) 40.5 - 52.5 %    MCV 89.0 80.0 - 100.0 fL    MCH 28.8 26.0 - 34.0 pg    MCHC 32.4 31.0 - 36.0 g/dL    RDW 14.9 12.4 - 15.4 %    Platelets 663 132 - 992 K/uL    MPV 8.1 5.0 - 10.5 fL    Neutrophils % 37.9 %    Lymphocytes % 52.7 %    Monocytes % 8.4 %    Eosinophils % 0.5 %    Basophils % 0.5 %    Neutrophils Absolute 1.5 (L) 1.7 - 7.7 K/uL    Lymphocytes Absolute 2.0 1.0 - 5.1 K/uL    Monocytes Absolute 0.3 0.0 - 1.3 K/uL    Eosinophils Absolute 0.0 0.0 - 0.6 K/uL    Basophils Absolute 0.0 0.0 - 0.2 K/uL   Comprehensive Metabolic Panel   Result Value Ref Range    Sodium 137 136 - 145 mmol/L    Potassium 4.0 3.5 - 5.1 mmol/L    Chloride 103 99 - 110 mmol/L    CO2 26 21 - 32 mmol/L    Anion Gap 8 3 - 16    Glucose 180 (H) 70 - 99 mg/dL    BUN 14 7 - 20 mg/dL    Creatinine 1.0 0.9 - 1.3 mg/dL    GFR Non-African American >60 >60    GFR African American >60 >60    Calcium 8.4 8.3 - 10.6 mg/dL    Total Protein 6.7 6.4 - 8.2 g/dL    Albumin 3.1 (L) 3.4 - 5.0 g/dL    Albumin/Globulin Ratio 0.9 (L) 1.1 - 2.2    Total Bilirubin 0.4 0.0 - 1.0 mg/dL    Alkaline Phosphatase 149 (H) 40 - 129 U/L    ALT 49 (H) 10 - 40 U/L    AST 94 (H) 15 - 37 U/L   Lactate, Sepsis   Result Value Ref Range    Lactic Acid, Sepsis 1.1 0.4 - 1.9 mmol/L   Troponin   Result Value Ref Range    Troponin <0.01 <0.01 ng/mL   POCT Glucose   Result Value Ref Range    POC Glucose 225 (H) 70 - 99 mg/dl    Performed on [employer]    EKG 12 Lead   Result Value Ref Range    Ventricular Rate 70 BPM    Atrial Rate 70 BPM    P-R Interval 150 ms    QRS Duration 92 ms    Q-T Interval 406 ms    QTc Calculation (Bazett) 438 ms    P Axis 144 degrees    R Axis 150 degrees    T Axis 144 degrees    Diagnosis        Suspect arm lead reversal, interpretation assumes no reversalNormal sinus rhythmNormal ECGWhen compared with ECG of 28-APR-2022 13:18,No significant change was foundConfirmed by Ricki Bryant ([id number]) on 9/22/2022 5:05:05 PM   EKG 12 Lead   Result Value Ref Range    Ventricular Rate 73 BPM    Atrial Rate 73 BPM    P-R Interval 150 ms    QRS Duration 78 ms    Q-T Interval 414 ms    QTc Calculation (Bazett) 456 ms    P Axis 58 degrees    R Axis 76 degrees    T Axis 66 degrees    Diagnosis       Normal sinus rhythmNormal ECGWhen compared with ECG of 22-SEP-2022 13:54,Sinus rhythm has replaced Ectopic atrial rhythm       I estimate there is LOW risk for PULMONARY EMBOLISM, ACUTE CORONARY SYNDROME, OR THORACIC AORTIC DISSECTION, thus I consider the discharge disposition reasonable. [facility] and I have discussed the diagnosis and risks, and we agree with discharging home to follow-up with their primary doctor. We also discussed returning to the Emergency Department immediately if new or worsening symptoms occur.  We have discussed the symptoms which are most concerning (e.g., bloody sputum, fever, worsening pain or shortness of breath, vomiting) that necessitate immediate return. FINAL Impression    1. COVID-19    2. Visit for suture removal        Blood pressure 96/66, pulse 68, temperature (!) 96.7 °F (35.9 °C), temperature source Axillary, resp. rate 18, weight 165 lb (74.8 kg), SpO2 100 %. mdm    Patient was sent home with a prescription for below medication/s. I did [place] patient on appropriate use of these medication. Discharge Medication List as of 9/22/2022  4:53 PM              FOLLOW UP  Yung Colbert [place]  [id number]  [address]  [address] [facility] [address] [phone]        DISPOSITION  Patient was discharged to home in good condition. Comment: Please note this report has been produced using speech recognition software and may contain errors related to that system including errors in grammar, punctuation, and spelling, as well as words and phrases that may be inappropriate. If there are any questions or concerns please feel free to contact the dictating provider for clarification.             ELIZABET Wells CNP  09/22/22 0418

## 2022-09-22 NOTE — DISCHARGE INSTRUCTIONS
Oxygen saturation remained above 95% during emergency department course he was monitored for over 3 hours  Labs and chest x-ray consistent with COVID-19 no sign of sepsis  Please continue to monitor vital signs as needed treat symptoms as needed follow-up with PCP and return the emergency department

## 2022-10-24 NOTE — CARE COORDINATION
[facility]    Referral received from  to follow for home care services.    [facility] unable to staff  Referral sent to [facility] home care able to staff Monday   [facility] agreeable    Yecenia Persaud RN, BSN CTN  [facility] [phone] Patient seen and examined on 10/24/22. Case discussed with JESSIE Childress. Communicated with patient's mother via [facility]  #[id number], [facility]. Patient continues to improve per mother at bedside. She still has a cough, but appetite has improved significantly and patient was able to tolerate her entire meal. Seen by speech and swallow who are in agreement with soft, bite-sized diet for patient. Continue ceftriaxone and azithromycin for multifocal PNA. Would transition patient over to po Keppra to ensure she is able to tolerate the medication. Likely dc in next 1-2 days. Family is requesting information about day programs in the area to improve Indu's socialization. Social work consult placed. Remaining care as noted above.

## 2023-09-28 ENCOUNTER — HOSPITAL ENCOUNTER (INPATIENT)
Age: 48
LOS: 7 days | Discharge: NURSING, REHAB OR LONG-TERM CARE | End: 2023-10-05
Attending: EMERGENCY MEDICINE | Admitting: INTERNAL MEDICINE
Payer: MEDICAID

## 2023-09-28 ENCOUNTER — APPOINTMENT (OUTPATIENT)
Dept: GENERAL RADIOLOGY | Age: 48
End: 2023-09-28
Payer: MEDICAID

## 2023-09-28 ENCOUNTER — ANESTHESIA EVENT (OUTPATIENT)
Dept: OPERATING ROOM | Age: 48
End: 2023-09-28
Payer: MEDICAID

## 2023-09-28 ENCOUNTER — ANESTHESIA (OUTPATIENT)
Dept: OPERATING ROOM | Age: 48
End: 2023-09-28
Payer: MEDICAID

## 2023-09-28 DIAGNOSIS — S72.302A CLOSED FRACTURE OF SHAFT OF LEFT FEMUR, UNSPECIFIED FRACTURE MORPHOLOGY, INITIAL ENCOUNTER (HCC): Primary | ICD-10-CM

## 2023-09-28 PROBLEM — S72.22XA CLOSED LEFT SUBTROCHANTERIC FEMUR FRACTURE, INITIAL ENCOUNTER (HCC): Status: ACTIVE | Noted: 2023-09-28

## 2023-09-28 LAB
ANION GAP SERPL CALCULATED.3IONS-SCNC: 11 MMOL/L (ref 3–16)
BASOPHILS # BLD: 0 K/UL (ref 0–0.2)
BASOPHILS NFR BLD: 0.1 %
BUN SERPL-MCNC: 25 MG/DL (ref 7–20)
CALCIUM SERPL-MCNC: 9.3 MG/DL (ref 8.3–10.6)
CHLORIDE SERPL-SCNC: 104 MMOL/L (ref 99–110)
CO2 SERPL-SCNC: 24 MMOL/L (ref 21–32)
CREAT SERPL-MCNC: 0.9 MG/DL (ref 0.9–1.3)
DEPRECATED RDW RBC AUTO: 14.8 % (ref 12.4–15.4)
EKG ATRIAL RATE: 96 BPM
EKG DIAGNOSIS: NORMAL
EKG P AXIS: 70 DEGREES
EKG P-R INTERVAL: 142 MS
EKG Q-T INTERVAL: 358 MS
EKG QRS DURATION: 74 MS
EKG QTC CALCULATION (BAZETT): 452 MS
EKG R AXIS: 84 DEGREES
EKG T AXIS: 75 DEGREES
EKG VENTRICULAR RATE: 96 BPM
EOSINOPHIL # BLD: 0.1 K/UL (ref 0–0.6)
EOSINOPHIL NFR BLD: 0.9 %
GFR SERPLBLD CREATININE-BSD FMLA CKD-EPI: >60 ML/MIN/{1.73_M2}
GLUCOSE BLD-MCNC: 190 MG/DL (ref 70–99)
GLUCOSE BLD-MCNC: 192 MG/DL (ref 70–99)
GLUCOSE BLD-MCNC: 193 MG/DL (ref 70–99)
GLUCOSE BLD-MCNC: 201 MG/DL (ref 70–99)
GLUCOSE BLD-MCNC: 319 MG/DL (ref 70–99)
GLUCOSE SERPL-MCNC: 293 MG/DL (ref 70–99)
HCT VFR BLD AUTO: 45.3 % (ref 40.5–52.5)
HGB BLD-MCNC: 14.5 G/DL (ref 13.5–17.5)
INR PPP: 0.95 (ref 0.84–1.16)
LYMPHOCYTES # BLD: 3.1 K/UL (ref 1–5.1)
LYMPHOCYTES NFR BLD: 20.7 %
MCH RBC QN AUTO: 29.1 PG (ref 26–34)
MCHC RBC AUTO-ENTMCNC: 32 G/DL (ref 31–36)
MCV RBC AUTO: 91 FL (ref 80–100)
MONOCYTES # BLD: 0.6 K/UL (ref 0–1.3)
MONOCYTES NFR BLD: 4 %
NEUTROPHILS # BLD: 11.3 K/UL (ref 1.7–7.7)
NEUTROPHILS NFR BLD: 74.3 %
PERFORMED ON: ABNORMAL
PLATELET # BLD AUTO: 190 K/UL (ref 135–450)
PMV BLD AUTO: 7.5 FL (ref 5–10.5)
POTASSIUM SERPL-SCNC: 4 MMOL/L (ref 3.5–5.1)
PROTHROMBIN TIME: 12.7 SEC (ref 11.5–14.8)
RBC # BLD AUTO: 4.98 M/UL (ref 4.2–5.9)
REASON FOR REJECTION: NORMAL
REJECTED TEST: NORMAL
SODIUM SERPL-SCNC: 139 MMOL/L (ref 136–145)
WBC # BLD AUTO: 15.2 K/UL (ref 4–11)

## 2023-09-28 PROCEDURE — C1769 GUIDE WIRE: HCPCS | Performed by: ORTHOPAEDIC SURGERY

## 2023-09-28 PROCEDURE — 7100000000 HC PACU RECOVERY - FIRST 15 MIN: Performed by: ORTHOPAEDIC SURGERY

## 2023-09-28 PROCEDURE — 3600000014 HC SURGERY LEVEL 4 ADDTL 15MIN: Performed by: ORTHOPAEDIC SURGERY

## 2023-09-28 PROCEDURE — 6360000002 HC RX W HCPCS: Performed by: ANESTHESIOLOGY

## 2023-09-28 PROCEDURE — 2500000003 HC RX 250 WO HCPCS: Performed by: ANESTHESIOLOGY

## 2023-09-28 PROCEDURE — 80048 BASIC METABOLIC PNL TOTAL CA: CPT

## 2023-09-28 PROCEDURE — 0QS706Z REPOSITION LEFT UPPER FEMUR WITH INTRAMEDULLARY INTERNAL FIXATION DEVICE, OPEN APPROACH: ICD-10-PCS | Performed by: INTERNAL MEDICINE

## 2023-09-28 PROCEDURE — 7100000001 HC PACU RECOVERY - ADDTL 15 MIN: Performed by: ORTHOPAEDIC SURGERY

## 2023-09-28 PROCEDURE — 6370000000 HC RX 637 (ALT 250 FOR IP)

## 2023-09-28 PROCEDURE — 64447 NJX AA&/STRD FEMORAL NRV IMG: CPT | Performed by: ANESTHESIOLOGY

## 2023-09-28 PROCEDURE — 83036 HEMOGLOBIN GLYCOSYLATED A1C: CPT

## 2023-09-28 PROCEDURE — 99285 EMERGENCY DEPT VISIT HI MDM: CPT

## 2023-09-28 PROCEDURE — 93010 ELECTROCARDIOGRAM REPORT: CPT | Performed by: INTERNAL MEDICINE

## 2023-09-28 PROCEDURE — 85610 PROTHROMBIN TIME: CPT

## 2023-09-28 PROCEDURE — 1200000000 HC SEMI PRIVATE

## 2023-09-28 PROCEDURE — 3700000001 HC ADD 15 MINUTES (ANESTHESIA): Performed by: ORTHOPAEDIC SURGERY

## 2023-09-28 PROCEDURE — 3600000004 HC SURGERY LEVEL 4 BASE: Performed by: ORTHOPAEDIC SURGERY

## 2023-09-28 PROCEDURE — 85025 COMPLETE CBC W/AUTO DIFF WBC: CPT

## 2023-09-28 PROCEDURE — 27245 TREAT THIGH FRACTURE: CPT | Performed by: ORTHOPAEDIC SURGERY

## 2023-09-28 PROCEDURE — 6360000002 HC RX W HCPCS: Performed by: EMERGENCY MEDICINE

## 2023-09-28 PROCEDURE — 96374 THER/PROPH/DIAG INJ IV PUSH: CPT

## 2023-09-28 PROCEDURE — 99252 IP/OBS CONSLTJ NEW/EST SF 35: CPT | Performed by: SPECIALIST/TECHNOLOGIST

## 2023-09-28 PROCEDURE — C1713 ANCHOR/SCREW BN/BN,TIS/BN: HCPCS | Performed by: ORTHOPAEDIC SURGERY

## 2023-09-28 PROCEDURE — 96376 TX/PRO/DX INJ SAME DRUG ADON: CPT

## 2023-09-28 PROCEDURE — 71045 X-RAY EXAM CHEST 1 VIEW: CPT

## 2023-09-28 PROCEDURE — A4217 STERILE WATER/SALINE, 500 ML: HCPCS | Performed by: ORTHOPAEDIC SURGERY

## 2023-09-28 PROCEDURE — 3700000000 HC ANESTHESIA ATTENDED CARE: Performed by: ORTHOPAEDIC SURGERY

## 2023-09-28 PROCEDURE — 72170 X-RAY EXAM OF PELVIS: CPT

## 2023-09-28 PROCEDURE — 2580000003 HC RX 258: Performed by: ORTHOPAEDIC SURGERY

## 2023-09-28 PROCEDURE — 2580000003 HC RX 258

## 2023-09-28 PROCEDURE — 73552 X-RAY EXAM OF FEMUR 2/>: CPT

## 2023-09-28 PROCEDURE — 2709999900 HC NON-CHARGEABLE SUPPLY: Performed by: ORTHOPAEDIC SURGERY

## 2023-09-28 PROCEDURE — 2720000010 HC SURG SUPPLY STERILE: Performed by: ORTHOPAEDIC SURGERY

## 2023-09-28 PROCEDURE — 93005 ELECTROCARDIOGRAM TRACING: CPT | Performed by: EMERGENCY MEDICINE

## 2023-09-28 DEVICE — IMPLANTABLE DEVICE
Type: IMPLANTABLE DEVICE | Site: FEMUR | Status: FUNCTIONAL
Brand: CABLE-READY®

## 2023-09-28 DEVICE — SCREW BNE L42MM DIA5MM TIB LT GRN TI ST CANN LOK FULL THRD: Type: IMPLANTABLE DEVICE | Site: FEMUR | Status: FUNCTIONAL

## 2023-09-28 DEVICE — SCREW BNE L40MM DIA5MM ST TIB LT GRN TI ST CANN LOK FULL: Type: IMPLANTABLE DEVICE | Site: FEMUR | Status: FUNCTIONAL

## 2023-09-28 DEVICE — NAIL IM L360MM DIA10MM 130DEG LNG L PROX FEM GRN TI CANN: Type: IMPLANTABLE DEVICE | Site: FEMUR | Status: FUNCTIONAL

## 2023-09-28 DEVICE — IMPLANTABLE DEVICE: Type: IMPLANTABLE DEVICE | Site: FEMUR | Status: FUNCTIONAL

## 2023-09-28 RX ORDER — GABAPENTIN 300 MG/1
300 CAPSULE ORAL 4 TIMES DAILY
Status: DISCONTINUED | OUTPATIENT
Start: 2023-09-28 | End: 2023-10-05 | Stop reason: HOSPADM

## 2023-09-28 RX ORDER — MAGNESIUM HYDROXIDE 1200 MG/15ML
LIQUID ORAL CONTINUOUS PRN
Status: COMPLETED | OUTPATIENT
Start: 2023-09-28 | End: 2023-09-28

## 2023-09-28 RX ORDER — PHENYLEPHRINE HCL IN 0.9% NACL 1 MG/10 ML
SYRINGE (ML) INTRAVENOUS PRN
Status: DISCONTINUED | OUTPATIENT
Start: 2023-09-28 | End: 2023-09-28 | Stop reason: SDUPTHER

## 2023-09-28 RX ORDER — ONDANSETRON 4 MG/1
4 TABLET, ORALLY DISINTEGRATING ORAL EVERY 8 HOURS PRN
Status: DISCONTINUED | OUTPATIENT
Start: 2023-09-28 | End: 2023-09-29

## 2023-09-28 RX ORDER — PANTOPRAZOLE SODIUM 40 MG/1
40 TABLET, DELAYED RELEASE ORAL
Status: DISCONTINUED | OUTPATIENT
Start: 2023-09-29 | End: 2023-09-29

## 2023-09-28 RX ORDER — ONDANSETRON 2 MG/ML
INJECTION INTRAMUSCULAR; INTRAVENOUS PRN
Status: DISCONTINUED | OUTPATIENT
Start: 2023-09-28 | End: 2023-09-28 | Stop reason: SDUPTHER

## 2023-09-28 RX ORDER — FENTANYL CITRATE 50 UG/ML
INJECTION, SOLUTION INTRAMUSCULAR; INTRAVENOUS PRN
Status: DISCONTINUED | OUTPATIENT
Start: 2023-09-28 | End: 2023-09-28 | Stop reason: SDUPTHER

## 2023-09-28 RX ORDER — POLYETHYLENE GLYCOL 3350 17 G/17G
17 POWDER, FOR SOLUTION ORAL DAILY PRN
Status: DISCONTINUED | OUTPATIENT
Start: 2023-09-28 | End: 2023-09-28 | Stop reason: SDUPTHER

## 2023-09-28 RX ORDER — ENOXAPARIN SODIUM 100 MG/ML
40 INJECTION SUBCUTANEOUS DAILY
Status: CANCELLED | OUTPATIENT
Start: 2023-09-28

## 2023-09-28 RX ORDER — ONDANSETRON 4 MG/1
4 TABLET, ORALLY DISINTEGRATING ORAL EVERY 8 HOURS PRN
Status: CANCELLED | OUTPATIENT
Start: 2023-09-28

## 2023-09-28 RX ORDER — SODIUM CHLORIDE 9 MG/ML
INJECTION, SOLUTION INTRAVENOUS PRN
Status: CANCELLED | OUTPATIENT
Start: 2023-09-28

## 2023-09-28 RX ORDER — OXYCODONE HYDROCHLORIDE 5 MG/1
10 TABLET ORAL PRN
Status: DISCONTINUED | OUTPATIENT
Start: 2023-09-28 | End: 2023-09-28 | Stop reason: HOSPADM

## 2023-09-28 RX ORDER — SODIUM CHLORIDE 0.9 % (FLUSH) 0.9 %
5-40 SYRINGE (ML) INJECTION EVERY 12 HOURS SCHEDULED
Status: DISCONTINUED | OUTPATIENT
Start: 2023-09-28 | End: 2023-10-05 | Stop reason: HOSPADM

## 2023-09-28 RX ORDER — DEXAMETHASONE SODIUM PHOSPHATE 4 MG/ML
INJECTION, SOLUTION INTRA-ARTICULAR; INTRALESIONAL; INTRAMUSCULAR; INTRAVENOUS; SOFT TISSUE PRN
Status: DISCONTINUED | OUTPATIENT
Start: 2023-09-28 | End: 2023-09-28 | Stop reason: SDUPTHER

## 2023-09-28 RX ORDER — BISACODYL 10 MG
10 SUPPOSITORY, RECTAL RECTAL 2 TIMES DAILY PRN
Status: DISCONTINUED | OUTPATIENT
Start: 2023-09-28 | End: 2023-10-05 | Stop reason: HOSPADM

## 2023-09-28 RX ORDER — OMEGA-3S/DHA/EPA/FISH OIL/D3 300MG-1000
400 CAPSULE ORAL DAILY
Status: DISCONTINUED | OUTPATIENT
Start: 2023-09-28 | End: 2023-10-05 | Stop reason: HOSPADM

## 2023-09-28 RX ORDER — HYDROMORPHONE HYDROCHLORIDE 2 MG/ML
INJECTION, SOLUTION INTRAMUSCULAR; INTRAVENOUS; SUBCUTANEOUS PRN
Status: DISCONTINUED | OUTPATIENT
Start: 2023-09-28 | End: 2023-09-28 | Stop reason: SDUPTHER

## 2023-09-28 RX ORDER — ACETAMINOPHEN 650 MG/1
650 SUPPOSITORY RECTAL EVERY 6 HOURS PRN
Status: DISCONTINUED | OUTPATIENT
Start: 2023-09-28 | End: 2023-10-05 | Stop reason: HOSPADM

## 2023-09-28 RX ORDER — DEXTROSE MONOHYDRATE 100 MG/ML
INJECTION, SOLUTION INTRAVENOUS CONTINUOUS PRN
Status: DISCONTINUED | OUTPATIENT
Start: 2023-09-28 | End: 2023-10-05 | Stop reason: HOSPADM

## 2023-09-28 RX ORDER — SODIUM CHLORIDE 0.9 % (FLUSH) 0.9 %
5-40 SYRINGE (ML) INJECTION EVERY 12 HOURS SCHEDULED
Status: DISCONTINUED | OUTPATIENT
Start: 2023-09-28 | End: 2023-09-29 | Stop reason: SDUPTHER

## 2023-09-28 RX ORDER — LAMOTRIGINE 100 MG/1
400 TABLET ORAL EVERY MORNING
Status: DISCONTINUED | OUTPATIENT
Start: 2023-09-28 | End: 2023-10-05 | Stop reason: HOSPADM

## 2023-09-28 RX ORDER — ONDANSETRON 2 MG/ML
4 INJECTION INTRAMUSCULAR; INTRAVENOUS EVERY 30 MIN PRN
Status: DISCONTINUED | OUTPATIENT
Start: 2023-09-28 | End: 2023-09-28 | Stop reason: HOSPADM

## 2023-09-28 RX ORDER — ROCURONIUM BROMIDE 10 MG/ML
INJECTION, SOLUTION INTRAVENOUS PRN
Status: DISCONTINUED | OUTPATIENT
Start: 2023-09-28 | End: 2023-09-28 | Stop reason: SDUPTHER

## 2023-09-28 RX ORDER — GLUCAGON 1 MG/ML
1 KIT INJECTION PRN
Status: CANCELLED | OUTPATIENT
Start: 2023-09-28

## 2023-09-28 RX ORDER — SODIUM CHLORIDE 0.9 % (FLUSH) 0.9 %
5-40 SYRINGE (ML) INJECTION PRN
Status: DISCONTINUED | OUTPATIENT
Start: 2023-09-28 | End: 2023-09-29 | Stop reason: SDUPTHER

## 2023-09-28 RX ORDER — ONDANSETRON 2 MG/ML
4 INJECTION INTRAMUSCULAR; INTRAVENOUS EVERY 6 HOURS PRN
Status: CANCELLED | OUTPATIENT
Start: 2023-09-28

## 2023-09-28 RX ORDER — CEFAZOLIN SODIUM 2 G/50ML
SOLUTION INTRAVENOUS PRN
Status: DISCONTINUED | OUTPATIENT
Start: 2023-09-28 | End: 2023-09-28 | Stop reason: SDUPTHER

## 2023-09-28 RX ORDER — INSULIN LISPRO 100 [IU]/ML
0-4 INJECTION, SOLUTION INTRAVENOUS; SUBCUTANEOUS NIGHTLY
Status: CANCELLED | OUTPATIENT
Start: 2023-09-28

## 2023-09-28 RX ORDER — ONDANSETRON 2 MG/ML
4 INJECTION INTRAMUSCULAR; INTRAVENOUS EVERY 6 HOURS PRN
Status: DISCONTINUED | OUTPATIENT
Start: 2023-09-28 | End: 2023-09-29

## 2023-09-28 RX ORDER — DEXTROSE MONOHYDRATE 100 MG/ML
INJECTION, SOLUTION INTRAVENOUS CONTINUOUS PRN
Status: CANCELLED | OUTPATIENT
Start: 2023-09-28

## 2023-09-28 RX ORDER — CARBAMAZEPINE 200 MG/1
400 TABLET, EXTENDED RELEASE ORAL 2 TIMES DAILY
Status: DISCONTINUED | OUTPATIENT
Start: 2023-09-28 | End: 2023-10-05 | Stop reason: HOSPADM

## 2023-09-28 RX ORDER — LIDOCAINE HYDROCHLORIDE 20 MG/ML
INJECTION, SOLUTION INFILTRATION; PERINEURAL PRN
Status: DISCONTINUED | OUTPATIENT
Start: 2023-09-28 | End: 2023-09-28 | Stop reason: SDUPTHER

## 2023-09-28 RX ORDER — POLYETHYLENE GLYCOL 3350 17 G/17G
17 POWDER, FOR SOLUTION ORAL DAILY PRN
Status: DISCONTINUED | OUTPATIENT
Start: 2023-09-28 | End: 2023-10-05 | Stop reason: HOSPADM

## 2023-09-28 RX ORDER — SODIUM CHLORIDE 0.9 % (FLUSH) 0.9 %
5-40 SYRINGE (ML) INJECTION PRN
Status: DISCONTINUED | OUTPATIENT
Start: 2023-09-28 | End: 2023-10-05 | Stop reason: HOSPADM

## 2023-09-28 RX ORDER — POLYETHYLENE GLYCOL 3350 17 G/17G
17 POWDER, FOR SOLUTION ORAL DAILY
Status: CANCELLED | OUTPATIENT
Start: 2023-09-28

## 2023-09-28 RX ORDER — ENOXAPARIN SODIUM 100 MG/ML
40 INJECTION SUBCUTANEOUS DAILY
Status: DISCONTINUED | OUTPATIENT
Start: 2023-09-28 | End: 2023-09-29

## 2023-09-28 RX ORDER — SODIUM CHLORIDE 9 MG/ML
INJECTION, SOLUTION INTRAVENOUS PRN
Status: DISCONTINUED | OUTPATIENT
Start: 2023-09-28 | End: 2023-09-29 | Stop reason: SDUPTHER

## 2023-09-28 RX ORDER — SODIUM CHLORIDE 0.9 % (FLUSH) 0.9 %
5-40 SYRINGE (ML) INJECTION EVERY 12 HOURS SCHEDULED
Status: CANCELLED | OUTPATIENT
Start: 2023-09-28

## 2023-09-28 RX ORDER — MORPHINE SULFATE 4 MG/ML
4 INJECTION, SOLUTION INTRAMUSCULAR; INTRAVENOUS ONCE
Status: COMPLETED | OUTPATIENT
Start: 2023-09-28 | End: 2023-09-28

## 2023-09-28 RX ORDER — LABETALOL HYDROCHLORIDE 5 MG/ML
10 INJECTION, SOLUTION INTRAVENOUS
Status: DISCONTINUED | OUTPATIENT
Start: 2023-09-28 | End: 2023-09-28 | Stop reason: HOSPADM

## 2023-09-28 RX ORDER — DEXMEDETOMIDINE HYDROCHLORIDE 100 UG/ML
INJECTION, SOLUTION INTRAVENOUS PRN
Status: DISCONTINUED | OUTPATIENT
Start: 2023-09-28 | End: 2023-09-28 | Stop reason: SDUPTHER

## 2023-09-28 RX ORDER — SODIUM CHLORIDE 0.9 % (FLUSH) 0.9 %
5-40 SYRINGE (ML) INJECTION EVERY 12 HOURS SCHEDULED
Status: DISCONTINUED | OUTPATIENT
Start: 2023-09-28 | End: 2023-09-28 | Stop reason: HOSPADM

## 2023-09-28 RX ORDER — CEFAZOLIN SODIUM IN 0.9 % NACL 2 G/100 ML
2000 PLASTIC BAG, INJECTION (ML) INTRAVENOUS
Status: DISPENSED | OUTPATIENT
Start: 2023-09-28 | End: 2023-09-29

## 2023-09-28 RX ORDER — INSULIN LISPRO 100 [IU]/ML
0-8 INJECTION, SOLUTION INTRAVENOUS; SUBCUTANEOUS
Status: DISCONTINUED | OUTPATIENT
Start: 2023-09-28 | End: 2023-09-29

## 2023-09-28 RX ORDER — SODIUM CHLORIDE 0.9 % (FLUSH) 0.9 %
5-40 SYRINGE (ML) INJECTION PRN
Status: CANCELLED | OUTPATIENT
Start: 2023-09-28

## 2023-09-28 RX ORDER — SODIUM CHLORIDE 0.9 % (FLUSH) 0.9 %
5-40 SYRINGE (ML) INJECTION PRN
Status: DISCONTINUED | OUTPATIENT
Start: 2023-09-28 | End: 2023-09-28 | Stop reason: HOSPADM

## 2023-09-28 RX ORDER — ASPIRIN 81 MG/1
81 TABLET, CHEWABLE ORAL DAILY
Status: DISCONTINUED | OUTPATIENT
Start: 2023-09-29 | End: 2023-10-05 | Stop reason: HOSPADM

## 2023-09-28 RX ORDER — SIMETHICONE 80 MG
80 TABLET,CHEWABLE ORAL
Status: DISCONTINUED | OUTPATIENT
Start: 2023-09-28 | End: 2023-10-05 | Stop reason: HOSPADM

## 2023-09-28 RX ORDER — PROPOFOL 10 MG/ML
INJECTION, EMULSION INTRAVENOUS PRN
Status: DISCONTINUED | OUTPATIENT
Start: 2023-09-28 | End: 2023-09-28 | Stop reason: SDUPTHER

## 2023-09-28 RX ORDER — ZONISAMIDE 100 MG/1
400 CAPSULE ORAL
Status: DISCONTINUED | OUTPATIENT
Start: 2023-09-28 | End: 2023-10-05 | Stop reason: HOSPADM

## 2023-09-28 RX ORDER — SODIUM CHLORIDE 9 MG/ML
INJECTION, SOLUTION INTRAVENOUS PRN
Status: DISCONTINUED | OUTPATIENT
Start: 2023-09-28 | End: 2023-09-28 | Stop reason: HOSPADM

## 2023-09-28 RX ORDER — DOCUSATE SODIUM 100 MG/1
100 CAPSULE, LIQUID FILLED ORAL 2 TIMES DAILY
Status: DISCONTINUED | OUTPATIENT
Start: 2023-09-28 | End: 2023-10-05 | Stop reason: HOSPADM

## 2023-09-28 RX ORDER — INSULIN LISPRO 100 [IU]/ML
0-8 INJECTION, SOLUTION INTRAVENOUS; SUBCUTANEOUS
Status: CANCELLED | OUTPATIENT
Start: 2023-09-28

## 2023-09-28 RX ORDER — BUPIVACAINE HYDROCHLORIDE AND EPINEPHRINE 2.5; 5 MG/ML; UG/ML
INJECTION, SOLUTION EPIDURAL; INFILTRATION; INTRACAUDAL; PERINEURAL
Status: COMPLETED | OUTPATIENT
Start: 2023-09-28 | End: 2023-09-28

## 2023-09-28 RX ORDER — OXYCODONE HYDROCHLORIDE 5 MG/1
5 TABLET ORAL PRN
Status: DISCONTINUED | OUTPATIENT
Start: 2023-09-28 | End: 2023-09-28 | Stop reason: HOSPADM

## 2023-09-28 RX ORDER — INSULIN GLARGINE 100 [IU]/ML
40 INJECTION, SOLUTION SUBCUTANEOUS NIGHTLY
Status: DISCONTINUED | OUTPATIENT
Start: 2023-09-28 | End: 2023-09-29

## 2023-09-28 RX ORDER — ACETAMINOPHEN 325 MG/1
650 TABLET ORAL EVERY 6 HOURS PRN
Status: DISCONTINUED | OUTPATIENT
Start: 2023-09-28 | End: 2023-10-05 | Stop reason: HOSPADM

## 2023-09-28 RX ADMIN — ONDANSETRON 4 MG: 2 INJECTION INTRAMUSCULAR; INTRAVENOUS at 20:43

## 2023-09-28 RX ADMIN — DEXMEDETOMIDINE HCL 2 MCG: 100 INJECTION INTRAVENOUS at 20:11

## 2023-09-28 RX ADMIN — Medication 100 MCG: at 19:26

## 2023-09-28 RX ADMIN — PROPOFOL 150 MG: 10 INJECTION, EMULSION INTRAVENOUS at 19:25

## 2023-09-28 RX ADMIN — MORPHINE SULFATE 4 MG: 4 INJECTION, SOLUTION INTRAMUSCULAR; INTRAVENOUS at 04:29

## 2023-09-28 RX ADMIN — LIDOCAINE HYDROCHLORIDE 30 MG: 20 INJECTION, SOLUTION INFILTRATION; PERINEURAL at 19:24

## 2023-09-28 RX ADMIN — Medication 200 MCG: at 19:35

## 2023-09-28 RX ADMIN — FENTANYL CITRATE 25 MCG: 50 INJECTION, SOLUTION INTRAMUSCULAR; INTRAVENOUS at 19:38

## 2023-09-28 RX ADMIN — Medication 10 ML: at 13:26

## 2023-09-28 RX ADMIN — INSULIN LISPRO 2 UNITS: 100 INJECTION, SOLUTION INTRAVENOUS; SUBCUTANEOUS at 17:28

## 2023-09-28 RX ADMIN — Medication 200 MCG: at 20:13

## 2023-09-28 RX ADMIN — ROCURONIUM BROMIDE 50 MG: 50 INJECTION, SOLUTION INTRAVENOUS at 19:26

## 2023-09-28 RX ADMIN — INSULIN LISPRO 6 UNITS: 100 INJECTION, SOLUTION INTRAVENOUS; SUBCUTANEOUS at 13:26

## 2023-09-28 RX ADMIN — FENTANYL CITRATE 25 MCG: 50 INJECTION, SOLUTION INTRAMUSCULAR; INTRAVENOUS at 19:25

## 2023-09-28 RX ADMIN — ROCURONIUM BROMIDE 10 MG: 50 INJECTION, SOLUTION INTRAVENOUS at 19:59

## 2023-09-28 RX ADMIN — FENTANYL CITRATE 50 MCG: 50 INJECTION, SOLUTION INTRAMUSCULAR; INTRAVENOUS at 19:56

## 2023-09-28 RX ADMIN — Medication 10 ML: at 22:44

## 2023-09-28 RX ADMIN — DEXAMETHASONE SODIUM PHOSPHATE 8 MG: 4 INJECTION, SOLUTION INTRAMUSCULAR; INTRAVENOUS at 19:35

## 2023-09-28 RX ADMIN — HYDROMORPHONE HYDROCHLORIDE 0.5 MG: 2 INJECTION, SOLUTION INTRAMUSCULAR; INTRAVENOUS; SUBCUTANEOUS at 20:43

## 2023-09-28 RX ADMIN — MORPHINE SULFATE 4 MG: 4 INJECTION, SOLUTION INTRAMUSCULAR; INTRAVENOUS at 06:20

## 2023-09-28 RX ADMIN — BUPIVACAINE HYDROCHLORIDE AND EPINEPHRINE BITARTRATE 20 ML: 2.5; .005 INJECTION, SOLUTION EPIDURAL; INFILTRATION; INTRACAUDAL; PERINEURAL at 21:18

## 2023-09-28 RX ADMIN — CEFAZOLIN SODIUM 2000 MG: 2 SOLUTION INTRAVENOUS at 19:26

## 2023-09-28 ASSESSMENT — PAIN SCALES - GENERAL
PAINLEVEL_OUTOF10: 10
PAINLEVEL_OUTOF10: 0
PAINLEVEL_OUTOF10: 0

## 2023-09-28 ASSESSMENT — PAIN DESCRIPTION - ORIENTATION: ORIENTATION: LEFT

## 2023-09-28 ASSESSMENT — PAIN - FUNCTIONAL ASSESSMENT
PAIN_FUNCTIONAL_ASSESSMENT: ADULT NONVERBAL PAIN SCALE (NPVS)
PAIN_FUNCTIONAL_ASSESSMENT: ADULT NONVERBAL PAIN SCALE (NPVS)

## 2023-09-28 ASSESSMENT — PAIN DESCRIPTION - LOCATION: LOCATION: LEG

## 2023-09-28 NOTE — CONSULTS
[facility] Dec for Orthopedic Surgery consult  Electronically signed by Saurav Boston on 9/28/2023 at 3:45 PM

## 2023-09-28 NOTE — CARE COORDINATION
Referred to patient for d/c planning. Patient with CP. Patient nonverbal.  Caregiver not at bedside. Call placed to mother who is patient's legal guardian, message left. CM to follow for d/c needs.   Electronically signed by STEPHANIE Valderrama on 9/28/2023 at 8:54 AM

## 2023-09-28 NOTE — PLAN OF CARE
68-year-old male presenting with left proximal to mid femur fracture. History of cerebral palsy, nonverbal, patient was having a diaper change by a caretaker when he was turned causing the injury. He is currently in a traction splint. He initially had dusky appearance of left foot however a traction splint was readjusted with improvement of distal circulation. Dr. Suma Rondon of orthopedics has been notified and advises that patient will need to undergo operation today. I did contact his legal guardian who is agreeable.

## 2023-09-28 NOTE — CARE COORDINATION
Case Management Assessment  Initial Evaluation    Date/Time of Evaluation: 9/28/2023 1:41 PM  Assessment Completed by: Anirudh Batista RN    If patient is discharged prior to next notation, then this note serves as note for discharge by case management. Patient Name: Baljeet Jasso                   YOB: 1975  Diagnosis: Closed left subtrochanteric femur fracture, initial encounter ([address]) [S72.22XA]  Closed fracture of shaft of left femur, unspecified fracture morphology, initial encounter ([address]) [S72.302A]  Closed displaced oblique fracture of shaft of femur, initial encounter ([address]) Nataly Arriaga                   Date / Time: 9/28/2023  3:34 AM    Patient Admission Status: Inpatient   Readmission Risk (Low < 19, Mod (19-27), High > 27): Readmission Risk Score: 12.8    Current PCP: Queen Osvaldo MD  PCP verified by CM? Yes (100% residential care)    Chart Reviewed: Yes      History Provided by: Child/Family (Legal Guardian/ Mother)  Patient Orientation: Unable to Assess (non verbal)    Patient Cognition: Alert    Hospitalization in the last 30 days (Readmission):  No    If yes, Readmission Assessment in CM Navigator will be completed. Advance Directives:      Code Status: Full Code   Patient's Primary Decision Maker is: Named in [address]    Primary Decision Maker: [address] [facility] [phone]    Secondary Decision Maker: Indira Fletcher - Aunt/Uncle - [phone]    Discharge Planning:    Patient lives with:  Other (Comment) (residents) Type of Home: Group Home  Primary Care Giver: Private caregiver  Patient Support Systems include: Parent, Family Members, Home Care Staff   Current Financial resources: Medicaid  Current community resources: None  Current services prior to admission: Other (Comment) (lives in resident home)            Current DME:              Type of Home Care services:  [employer], Nursing Services    ADLS  Prior functional level: Assistance with the [S72.302A]  Closed displaced oblique fracture of shaft of femur, initial encounter ([address] ) [S68.715O]    IF APPLICABLE: The Patient and/or patient representative Shavon Paez and his family were provided with a choice of provider and agrees with the discharge plan. Freedom of choice list with basic dialogue that supports the patient's individualized plan of care/goals and shares the quality data associated with the providers was provided to: Patient   Patient Representative Name:       The Patient and/or Patient Representative Agree with the Discharge Plan?  Yes    Calin Contreras RN  Case Management Department  Ph: [phone] Fax: [phone]

## 2023-09-28 NOTE — PROGRESS NOTES
Admission questions completed with Yani Gave parent/POA. POA unable to verify home med list or last time any medication were taken. Verbal consent obtained from POA with [facility] RN.       1309: Meds updated with Lula Farmer pt caregiver

## 2023-09-28 NOTE — CONSULTS
Department of Orthopedic Surgery  Physician Assistant   Consult Note        Reason for Consult:  left femur fracture  Requesting Physician: Miranda Crump MD  Date of Service: 9/28/2023 2:47 PM    CHIEF COMPLAINT:  As Above    History Obtained From:  mother- Ranjit Dos Santos, electronic medical record, reason patient could not give history:  Pt is non-verbal, deaf, cerebral palsy     HISTORY OF PRESENT ILLNESS:                The patient is a 57 y.o. male with cerebral palsy, severe intellectual disability, DM2, seizure disorder, PAD who presents with above chief complaint. Per report, the patients caregiver was turning him to change his diaper when they heard a snap and the patient experienced immediate pain. EMS was activated, traction splint was applied and the pt was transported to [facility] where he was found to have a subtrochanteric fracture; Orthopedics was consulted. Pt appears comfortable at this time, splint is still applied, resting comfortably in bed. No family or caregivers at bedside. Telephone call was made to patient's mother who states the patient does walk occasionally, with assistance; she believes he has neuropathy that contribute to his balance issues.     Past Medical History:        Diagnosis Date    Acne     Allergic rhinitis     Blindness, legal     Constipation     Dehydration     Dermatitis     Diabetes mellitus (HCC)     Epilepsy ([address])     GERD (gastroesophageal reflux disease)     GERD (gastroesophageal reflux disease)     Hearing loss     Hypokalemia     Hypothyroidism     Impulse control disorder     Incontinence     Insomnia     Mental retardation     Mental retardation, profound (I.Q. < 20)     Microcephaly (HCC)     MRSA (methicillin resistant staph aureus) culture positive 1/15/16    hand    Non-verbal learning disorder     PAD (peripheral artery disease) ([address])     Pneumonia     Scoliosis     Thyroid disease      Past Surgical History:        Procedure Laterality Date    CHEST TUBE Historical Provider, MD   glucose (GLUTOSE) 40 % GEL Take 37.5 mLs by mouth See Admin Instructions if pt is symptomatic and BS less than 50    Historical Provider, MD   omeprazole (PRILOSEC) 20 MG capsule Take 1 capsule by mouth daily    Historical Provider, MD   sertraline (ZOLOFT) 50 MG tablet Take 1 tablet by mouth nightly    Historical Provider, MD   simethicone (MYLICON) 80 MG chewable tablet Take 1 tablet by mouth three times daily    Historical Provider, MD   Sodium Phosphates (FLEET ENEMA) 7-19 GM/118ML ENEM Place 118 mLs rectally as needed. Insert 1 rectally as needed for constipation if no BM in 4 Days (x1)     Historical Provider, MD   aspirin 81 MG chewable tablet Take 1 tablet by mouth daily    Historical Provider, MD   docusate sodium (COLACE) 100 MG capsule Take 1 capsule by mouth 2 times daily    Historical Provider, MD   Melatonin 3 MG TABS Take 1 tablet by mouth nightly    Historical Provider, MD   polyethylene glycol (GLYCOLAX) powder Take 17 g by mouth every other day    Historical Provider, MD   Acetaminophen (TYLENOL PO) Take  by mouth. 650 po/pr q4hr prn pain/fever     Historical Provider, MD   bisacodyl (DULCOLAX) 10 MG suppository Place 1 suppository rectally 2 times daily as needed    Historical Provider, MD       Allergies:  Polymyxin b and Polytrim [polymyxin b-trimethoprim]    Social History:    Tobacco:  reports that he has never smoked. He has never used smokeless tobacco.   Alcohol:  reports no history of alcohol use. Illicit Drug: No  Family History:   No family history on file.     REVIEW OF SYSTEMS:    CONSTITUTIONAL:  negative  MUSCULOSKELETAL:  positive for  pain  All other systems reviewed and negative    PHYSICAL EXAM:    awake, alert, cooperative, no apparent distress, and appears stated age  MUSCULOSKELETAL:  there is no redness, warmth, or swelling of the joints  with exception of  LEFT HIP:  left leg in traction splint   Leg is externally rotated, unable to assess leg

## 2023-09-28 NOTE — ED PROVIDER NOTES
[address] [place] [address]  ED  EMERGENCY DEPARTMENT ENCOUNTER        Patient Name: Liu Ortiz  MRN: [id number]  [facility] 1975  Date of evaluation: 9/28/2023  Provider: María Elena Irizarry MD  PCP: Chel Sandhu MD  Note Started: 3:42 AM EDT 9/28/23    CHIEF COMPLAINT       Leg Injury (Per EMS care taker was trying to change diaper when patient twisted away and felt pop and crepitus )      HISTORY OF PRESENT ILLNESS: 1 or more Elements     History from : EMS    Limitations to history : nonverbal at baseline    Liu Ortiz is a 57 y.o. male who presents for evaluation of leg injury. According to EMS, his caretaker was trying to change his diaper when the patient had twisted away from him and there is a pop and a crack in the left femur region. Patient had had onset of pain at that time. When EMS arrived, they noted a obvious deformity to the left thigh and placed the patient in traction splint with concern for femoral shaft fracture. They did note that the patient had improvement of pain after the splint was placed. Nursing Notes were all reviewed and agreed with or any disagreements were addressed in the HPI. REVIEW OF SYSTEMS :      Review of Systems    Positives and Pertinent negatives as per HPI. SURGICAL HISTORY     Past Surgical History:   Procedure Laterality Date    CHEST TUBE INSERTION      upper lobes bilat; when born    OTHER SURGICAL HISTORY Right 1-7-16    excision thumb mass       CURRENTMEDICATIONS       Previous Medications    ACETAMINOPHEN (TYLENOL PO)    Take  by mouth. 650 po/pr q4hr prn pain/fever     ACETAMINOPHEN (TYLENOL) 650 MG SUPPOSITORY    Place 650 mg rectally every 4 hours as needed for Fever    ASPIRIN 81 MG CHEWABLE TABLET    Take 81 mg by mouth daily.       BENZOYL PEROXIDE 5 % LOTN    Apply topically as needed (Acne)    BISACODYL (DULCOLAX) 10 MG SUPPOSITORY    Place 10 mg rectally 2 times daily as needed     CARBAMAZEPINE (CARBATROL) 200 MG CR CAPSULE morphology, initial encounter [facility])          DISPOSITION/PLAN     DISPOSITION Decision To Admit 09/28/2023 05:05:17 AM      PATIENT REFERRED TO:  No follow-up provider specified. DISCHARGE MEDICATIONS:  Patient was given scripts for the following medications. I counseled patient how to take these medications:  New Prescriptions    No medications on file       DISCONTINUED MEDICATIONS:  Discontinued Medications    No medications on file              (This chart was generated in part by using Dragon Dictation system and may contain errors related to that system including errors in grammar, punctuation, and spelling, as well as words and phrases that may be inappropriate.  If there are any questions or concerns please feel free to contact the dictating provider for clarification.)    MD Maggy Hoover MD  09/28/23 2069

## 2023-09-28 NOTE — ED NOTES
Went in to check on patient. Gave pt warm blanket, assessed pulses in left lower extremity. Pulses present and strong. Assessed pts pain on non-verbal scale, patient appears comfortable and BP within normal limits.       Emma Ennis RN  09/28/23 3448

## 2023-09-28 NOTE — PROGRESS NOTES
Patient admitted to room C546 from ED. Attempted to orient patient to room, call light, bed rails, phone, lights and bathroom however pt is nonverbal - deaf and blind. Bed alarm and avasure in place, attempted to make pt aware, Eleuterio Duran - parent/POA is aware of placement and reason. Bed locked, in lowest position, side rails up 2/4, call light within reach.

## 2023-09-29 ENCOUNTER — APPOINTMENT (OUTPATIENT)
Dept: GENERAL RADIOLOGY | Age: 48
End: 2023-09-29
Payer: MEDICAID

## 2023-09-29 LAB
ALBUMIN SERPL-MCNC: 3.5 G/DL (ref 3.4–5)
ALBUMIN/GLOB SERPL: 1.2 {RATIO} (ref 1.1–2.2)
ALP SERPL-CCNC: 197 U/L (ref 40–129)
ALT SERPL-CCNC: 14 U/L (ref 10–40)
ANION GAP SERPL CALCULATED.3IONS-SCNC: 12 MMOL/L (ref 3–16)
AST SERPL-CCNC: 29 U/L (ref 15–37)
BASOPHILS # BLD: 0 K/UL (ref 0–0.2)
BASOPHILS NFR BLD: 0.1 %
BILIRUB SERPL-MCNC: <0.2 MG/DL (ref 0–1)
BUN SERPL-MCNC: 21 MG/DL (ref 7–20)
CALCIUM SERPL-MCNC: 8.6 MG/DL (ref 8.3–10.6)
CHLORIDE SERPL-SCNC: 105 MMOL/L (ref 99–110)
CO2 SERPL-SCNC: 24 MMOL/L (ref 21–32)
CREAT SERPL-MCNC: 1 MG/DL (ref 0.9–1.3)
DEPRECATED RDW RBC AUTO: 14.8 % (ref 12.4–15.4)
EKG ATRIAL RATE: 116 BPM
EKG DIAGNOSIS: NORMAL
EKG P AXIS: 59 DEGREES
EKG P-R INTERVAL: 128 MS
EKG Q-T INTERVAL: 322 MS
EKG QRS DURATION: 78 MS
EKG QTC CALCULATION (BAZETT): 447 MS
EKG R AXIS: 81 DEGREES
EKG T AXIS: 52 DEGREES
EKG VENTRICULAR RATE: 116 BPM
EOSINOPHIL # BLD: 0 K/UL (ref 0–0.6)
EOSINOPHIL NFR BLD: 0 %
EST. AVERAGE GLUCOSE BLD GHB EST-MCNC: 220.2 MG/DL
GFR SERPLBLD CREATININE-BSD FMLA CKD-EPI: >60 ML/MIN/{1.73_M2}
GLUCOSE BLD-MCNC: 127 MG/DL (ref 70–99)
GLUCOSE BLD-MCNC: 220 MG/DL (ref 70–99)
GLUCOSE BLD-MCNC: 292 MG/DL (ref 70–99)
GLUCOSE BLD-MCNC: 306 MG/DL (ref 70–99)
GLUCOSE BLD-MCNC: 62 MG/DL (ref 70–99)
GLUCOSE BLD-MCNC: 67 MG/DL (ref 70–99)
GLUCOSE BLD-MCNC: 67 MG/DL (ref 70–99)
GLUCOSE SERPL-MCNC: 322 MG/DL (ref 70–99)
HBA1C MFR BLD: 9.3 %
HCT VFR BLD AUTO: 32.9 % (ref 40.5–52.5)
HGB BLD-MCNC: 11 G/DL (ref 13.5–17.5)
LYMPHOCYTES # BLD: 2.3 K/UL (ref 1–5.1)
LYMPHOCYTES NFR BLD: 25.4 %
MCH RBC QN AUTO: 29.7 PG (ref 26–34)
MCHC RBC AUTO-ENTMCNC: 33.6 G/DL (ref 31–36)
MCV RBC AUTO: 88.5 FL (ref 80–100)
MONOCYTES # BLD: 0.8 K/UL (ref 0–1.3)
MONOCYTES NFR BLD: 8.3 %
NEUTROPHILS # BLD: 6.1 K/UL (ref 1.7–7.7)
NEUTROPHILS NFR BLD: 66.2 %
PERFORMED ON: ABNORMAL
PHOSPHATE SERPL-MCNC: 4.3 MG/DL (ref 2.5–4.9)
PLATELET # BLD AUTO: 184 K/UL (ref 135–450)
PMV BLD AUTO: 7.8 FL (ref 5–10.5)
POTASSIUM SERPL-SCNC: 4.2 MMOL/L (ref 3.5–5.1)
POTASSIUM SERPL-SCNC: 4.2 MMOL/L (ref 3.5–5.1)
PROCALCITONIN SERPL IA-MCNC: 0.11 NG/ML (ref 0–0.15)
PROT SERPL-MCNC: 6.4 G/DL (ref 6.4–8.2)
RBC # BLD AUTO: 3.72 M/UL (ref 4.2–5.9)
SODIUM SERPL-SCNC: 141 MMOL/L (ref 136–145)
WBC # BLD AUTO: 9.2 K/UL (ref 4–11)

## 2023-09-29 PROCEDURE — 84145 PROCALCITONIN (PCT): CPT

## 2023-09-29 PROCEDURE — 80053 COMPREHEN METABOLIC PANEL: CPT

## 2023-09-29 PROCEDURE — 97167 OT EVAL HIGH COMPLEX 60 MIN: CPT

## 2023-09-29 PROCEDURE — 6360000002 HC RX W HCPCS: Performed by: ORTHOPAEDIC SURGERY

## 2023-09-29 PROCEDURE — 6360000002 HC RX W HCPCS: Performed by: SURGERY

## 2023-09-29 PROCEDURE — 1200000000 HC SEMI PRIVATE

## 2023-09-29 PROCEDURE — 2580000003 HC RX 258: Performed by: ORTHOPAEDIC SURGERY

## 2023-09-29 PROCEDURE — C9113 INJ PANTOPRAZOLE SODIUM, VIA: HCPCS | Performed by: SURGERY

## 2023-09-29 PROCEDURE — 93010 ELECTROCARDIOGRAM REPORT: CPT | Performed by: INTERNAL MEDICINE

## 2023-09-29 PROCEDURE — 85025 COMPLETE CBC W/AUTO DIFF WBC: CPT

## 2023-09-29 PROCEDURE — 97530 THERAPEUTIC ACTIVITIES: CPT

## 2023-09-29 PROCEDURE — 6370000000 HC RX 637 (ALT 250 FOR IP): Performed by: ORTHOPAEDIC SURGERY

## 2023-09-29 PROCEDURE — 71045 X-RAY EXAM CHEST 1 VIEW: CPT

## 2023-09-29 PROCEDURE — 6370000000 HC RX 637 (ALT 250 FOR IP): Performed by: NURSE PRACTITIONER

## 2023-09-29 PROCEDURE — 99024 POSTOP FOLLOW-UP VISIT: CPT | Performed by: SPECIALIST/TECHNOLOGIST

## 2023-09-29 PROCEDURE — 6370000000 HC RX 637 (ALT 250 FOR IP): Performed by: SPECIALIST/TECHNOLOGIST

## 2023-09-29 PROCEDURE — 93005 ELECTROCARDIOGRAM TRACING: CPT

## 2023-09-29 PROCEDURE — 6370000000 HC RX 637 (ALT 250 FOR IP)

## 2023-09-29 PROCEDURE — 36415 COLL VENOUS BLD VENIPUNCTURE: CPT

## 2023-09-29 PROCEDURE — 97162 PT EVAL MOD COMPLEX 30 MIN: CPT

## 2023-09-29 PROCEDURE — 2580000003 HC RX 258

## 2023-09-29 PROCEDURE — 84100 ASSAY OF PHOSPHORUS: CPT

## 2023-09-29 RX ORDER — INSULIN LISPRO 100 [IU]/ML
0.08 INJECTION, SOLUTION INTRAVENOUS; SUBCUTANEOUS
Status: DISCONTINUED | OUTPATIENT
Start: 2023-09-29 | End: 2023-10-05 | Stop reason: HOSPADM

## 2023-09-29 RX ORDER — INSULIN GLARGINE 100 [IU]/ML
20 INJECTION, SOLUTION SUBCUTANEOUS NIGHTLY
Status: DISCONTINUED | OUTPATIENT
Start: 2023-09-29 | End: 2023-09-29

## 2023-09-29 RX ORDER — CEFAZOLIN SODIUM IN 0.9 % NACL 2 G/100 ML
2000 PLASTIC BAG, INJECTION (ML) INTRAVENOUS EVERY 8 HOURS
Status: COMPLETED | OUTPATIENT
Start: 2023-09-29 | End: 2023-09-29

## 2023-09-29 RX ORDER — ENOXAPARIN SODIUM 100 MG/ML
40 INJECTION SUBCUTANEOUS DAILY
Qty: 12 ML | Refills: 0 | Status: SHIPPED | OUTPATIENT
Start: 2023-09-30 | End: 2023-10-30

## 2023-09-29 RX ORDER — OXYCODONE HYDROCHLORIDE 5 MG/1
5 TABLET ORAL EVERY 6 HOURS PRN
Qty: 28 TABLET | Refills: 0 | Status: SHIPPED | OUTPATIENT
Start: 2023-09-29 | End: 2023-10-06

## 2023-09-29 RX ORDER — ENOXAPARIN SODIUM 100 MG/ML
40 INJECTION SUBCUTANEOUS DAILY
Status: DISCONTINUED | OUTPATIENT
Start: 2023-09-29 | End: 2023-10-05 | Stop reason: HOSPADM

## 2023-09-29 RX ORDER — ONDANSETRON 2 MG/ML
4 INJECTION INTRAMUSCULAR; INTRAVENOUS EVERY 6 HOURS PRN
Status: DISCONTINUED | OUTPATIENT
Start: 2023-09-29 | End: 2023-10-05 | Stop reason: HOSPADM

## 2023-09-29 RX ORDER — SODIUM CHLORIDE 9 MG/ML
INJECTION, SOLUTION INTRAVENOUS PRN
Status: DISCONTINUED | OUTPATIENT
Start: 2023-09-29 | End: 2023-10-05 | Stop reason: HOSPADM

## 2023-09-29 RX ORDER — PANTOPRAZOLE SODIUM 40 MG/10ML
40 INJECTION, POWDER, LYOPHILIZED, FOR SOLUTION INTRAVENOUS DAILY
Status: DISCONTINUED | OUTPATIENT
Start: 2023-09-29 | End: 2023-10-05 | Stop reason: HOSPADM

## 2023-09-29 RX ORDER — ONDANSETRON 4 MG/1
4 TABLET, ORALLY DISINTEGRATING ORAL EVERY 8 HOURS PRN
Status: DISCONTINUED | OUTPATIENT
Start: 2023-09-29 | End: 2023-10-05 | Stop reason: HOSPADM

## 2023-09-29 RX ORDER — ACETAMINOPHEN 325 MG/1
650 TABLET ORAL EVERY 6 HOURS
Status: DISCONTINUED | OUTPATIENT
Start: 2023-09-29 | End: 2023-10-05 | Stop reason: HOSPADM

## 2023-09-29 RX ORDER — SODIUM CHLORIDE, SODIUM LACTATE, POTASSIUM CHLORIDE, AND CALCIUM CHLORIDE .6; .31; .03; .02 G/100ML; G/100ML; G/100ML; G/100ML
500 INJECTION, SOLUTION INTRAVENOUS ONCE
Status: COMPLETED | OUTPATIENT
Start: 2023-09-29 | End: 2023-09-29

## 2023-09-29 RX ORDER — ACETAMINOPHEN 325 MG/1
650 TABLET ORAL EVERY 6 HOURS
Qty: 240 TABLET | Refills: 0 | Status: SHIPPED | OUTPATIENT
Start: 2023-09-29 | End: 2023-10-29

## 2023-09-29 RX ORDER — INSULIN LISPRO 100 [IU]/ML
0-4 INJECTION, SOLUTION INTRAVENOUS; SUBCUTANEOUS NIGHTLY
Status: DISCONTINUED | OUTPATIENT
Start: 2023-09-29 | End: 2023-10-05 | Stop reason: HOSPADM

## 2023-09-29 RX ORDER — INSULIN LISPRO 100 [IU]/ML
0-8 INJECTION, SOLUTION INTRAVENOUS; SUBCUTANEOUS
Status: DISCONTINUED | OUTPATIENT
Start: 2023-09-29 | End: 2023-10-05 | Stop reason: HOSPADM

## 2023-09-29 RX ORDER — SODIUM CHLORIDE 0.9 % (FLUSH) 0.9 %
5-40 SYRINGE (ML) INJECTION PRN
Status: DISCONTINUED | OUTPATIENT
Start: 2023-09-29 | End: 2023-09-29 | Stop reason: SDUPTHER

## 2023-09-29 RX ORDER — SODIUM CHLORIDE 0.9 % (FLUSH) 0.9 %
5-40 SYRINGE (ML) INJECTION EVERY 12 HOURS SCHEDULED
Status: DISCONTINUED | OUTPATIENT
Start: 2023-09-29 | End: 2023-09-29 | Stop reason: SDUPTHER

## 2023-09-29 RX ORDER — INSULIN GLARGINE 100 [IU]/ML
30 INJECTION, SOLUTION SUBCUTANEOUS NIGHTLY
Status: DISCONTINUED | OUTPATIENT
Start: 2023-09-29 | End: 2023-09-30

## 2023-09-29 RX ORDER — CARBAMAZEPINE 100 MG/1
200 TABLET, CHEWABLE ORAL 4 TIMES DAILY
Status: DISCONTINUED | OUTPATIENT
Start: 2023-09-29 | End: 2023-10-05 | Stop reason: HOSPADM

## 2023-09-29 RX ORDER — OXYCODONE HYDROCHLORIDE 5 MG/1
5 TABLET ORAL EVERY 4 HOURS PRN
Status: DISCONTINUED | OUTPATIENT
Start: 2023-09-29 | End: 2023-10-02

## 2023-09-29 RX ORDER — INSULIN LISPRO 100 [IU]/ML
0-4 INJECTION, SOLUTION INTRAVENOUS; SUBCUTANEOUS EVERY 4 HOURS
Status: DISCONTINUED | OUTPATIENT
Start: 2023-09-29 | End: 2023-09-29

## 2023-09-29 RX ORDER — OXYCODONE HYDROCHLORIDE 5 MG/1
10 TABLET ORAL EVERY 4 HOURS PRN
Status: DISCONTINUED | OUTPATIENT
Start: 2023-09-29 | End: 2023-10-02

## 2023-09-29 RX ADMIN — INSULIN LISPRO 2 UNITS: 100 INJECTION, SOLUTION INTRAVENOUS; SUBCUTANEOUS at 08:54

## 2023-09-29 RX ADMIN — Medication 10 ML: at 08:49

## 2023-09-29 RX ADMIN — INSULIN LISPRO 4 UNITS: 100 INJECTION, SOLUTION INTRAVENOUS; SUBCUTANEOUS at 18:13

## 2023-09-29 RX ADMIN — OXYCODONE HYDROCHLORIDE 10 MG: 5 TABLET ORAL at 18:43

## 2023-09-29 RX ADMIN — SODIUM CHLORIDE, PRESERVATIVE FREE 10 ML: 5 INJECTION INTRAVENOUS at 22:45

## 2023-09-29 RX ADMIN — SIMETHICONE 80 MG: 80 TABLET, CHEWABLE ORAL at 14:25

## 2023-09-29 RX ADMIN — OXYCODONE HYDROCHLORIDE 5 MG: 5 TABLET ORAL at 11:59

## 2023-09-29 RX ADMIN — LEVOTHYROXINE SODIUM 75 MCG: 0.03 TABLET ORAL at 06:47

## 2023-09-29 RX ADMIN — ACETAMINOPHEN 650 MG: 325 TABLET ORAL at 11:59

## 2023-09-29 RX ADMIN — OXYCODONE HYDROCHLORIDE 5 MG: 5 TABLET ORAL at 22:44

## 2023-09-29 RX ADMIN — SODIUM CHLORIDE, POTASSIUM CHLORIDE, SODIUM LACTATE AND CALCIUM CHLORIDE 500 ML: 600; 310; 30; 20 INJECTION, SOLUTION INTRAVENOUS at 08:51

## 2023-09-29 RX ADMIN — SIMETHICONE 80 MG: 80 TABLET, CHEWABLE ORAL at 01:19

## 2023-09-29 RX ADMIN — LAMOTRIGINE 450 MG: 100 TABLET ORAL at 01:17

## 2023-09-29 RX ADMIN — CARBAMAZEPINE 200 MG: 100 TABLET, CHEWABLE ORAL at 18:12

## 2023-09-29 RX ADMIN — ZONISAMIDE 400 MG: 100 CAPSULE ORAL at 22:43

## 2023-09-29 RX ADMIN — ASPIRIN 81 MG: 81 TABLET, CHEWABLE ORAL at 08:48

## 2023-09-29 RX ADMIN — ACETAMINOPHEN 650 MG: 325 TABLET ORAL at 18:11

## 2023-09-29 RX ADMIN — SIMETHICONE 80 MG: 80 TABLET, CHEWABLE ORAL at 08:48

## 2023-09-29 RX ADMIN — GABAPENTIN 300 MG: 300 CAPSULE ORAL at 01:18

## 2023-09-29 RX ADMIN — GABAPENTIN 300 MG: 300 CAPSULE ORAL at 22:44

## 2023-09-29 RX ADMIN — INSULIN LISPRO 6 UNITS: 100 INJECTION, SOLUTION INTRAVENOUS; SUBCUTANEOUS at 12:22

## 2023-09-29 RX ADMIN — ENOXAPARIN SODIUM 40 MG: 100 INJECTION SUBCUTANEOUS at 08:49

## 2023-09-29 RX ADMIN — SODIUM CHLORIDE, PRESERVATIVE FREE 10 ML: 5 INJECTION INTRAVENOUS at 01:19

## 2023-09-29 RX ADMIN — DOCUSATE SODIUM 100 MG: 100 CAPSULE, LIQUID FILLED ORAL at 08:48

## 2023-09-29 RX ADMIN — INSULIN GLARGINE 20 UNITS: 100 INJECTION, SOLUTION SUBCUTANEOUS at 01:26

## 2023-09-29 RX ADMIN — ZONISAMIDE 400 MG: 100 CAPSULE ORAL at 01:27

## 2023-09-29 RX ADMIN — LAMOTRIGINE 400 MG: 100 TABLET ORAL at 08:48

## 2023-09-29 RX ADMIN — GABAPENTIN 300 MG: 300 CAPSULE ORAL at 14:25

## 2023-09-29 RX ADMIN — CHOLECALCIFEROL (VITAMIN D3) 10 MCG (400 UNIT) TABLET 400 UNITS: at 08:49

## 2023-09-29 RX ADMIN — INSULIN LISPRO 4 UNITS: 100 INJECTION, SOLUTION INTRAVENOUS; SUBCUTANEOUS at 12:22

## 2023-09-29 RX ADMIN — GABAPENTIN 300 MG: 300 CAPSULE ORAL at 08:48

## 2023-09-29 RX ADMIN — INSULIN LISPRO 2 UNITS: 100 INJECTION, SOLUTION INTRAVENOUS; SUBCUTANEOUS at 18:14

## 2023-09-29 RX ADMIN — Medication 2000 MG: at 11:59

## 2023-09-29 RX ADMIN — CARBAMAZEPINE 200 MG: 100 TABLET, CHEWABLE ORAL at 14:25

## 2023-09-29 RX ADMIN — CARBAMAZEPINE 200 MG: 100 TABLET, CHEWABLE ORAL at 08:48

## 2023-09-29 RX ADMIN — SODIUM CHLORIDE, PRESERVATIVE FREE 10 ML: 5 INJECTION INTRAVENOUS at 08:52

## 2023-09-29 RX ADMIN — SERTRALINE 50 MG: 50 TABLET, FILM COATED ORAL at 22:44

## 2023-09-29 RX ADMIN — SERTRALINE 50 MG: 50 TABLET, FILM COATED ORAL at 01:18

## 2023-09-29 RX ADMIN — Medication 2000 MG: at 04:20

## 2023-09-29 RX ADMIN — GABAPENTIN 300 MG: 300 CAPSULE ORAL at 18:12

## 2023-09-29 RX ADMIN — LAMOTRIGINE 450 MG: 100 TABLET ORAL at 22:43

## 2023-09-29 RX ADMIN — PANTOPRAZOLE SODIUM 40 MG: 40 INJECTION, POWDER, FOR SOLUTION INTRAVENOUS at 08:49

## 2023-09-29 RX ADMIN — CARBAMAZEPINE 200 MG: 100 TABLET, CHEWABLE ORAL at 22:43

## 2023-09-29 RX ADMIN — DEXTROSE MONOHYDRATE 125 ML: 100 INJECTION, SOLUTION INTRAVENOUS at 21:08

## 2023-09-29 ASSESSMENT — PAIN SCALES - GENERAL
PAINLEVEL_OUTOF10: 0
PAINLEVEL_OUTOF10: 4
PAINLEVEL_OUTOF10: 0

## 2023-09-29 ASSESSMENT — PAIN DESCRIPTION - LOCATION
LOCATION: LEG

## 2023-09-29 ASSESSMENT — PAIN DESCRIPTION - ORIENTATION
ORIENTATION: LEFT

## 2023-09-29 ASSESSMENT — PAIN DESCRIPTION - DESCRIPTORS: DESCRIPTORS: ACHING

## 2023-09-29 ASSESSMENT — PAIN SCALES - WONG BAKER: WONGBAKER_NUMERICALRESPONSE: 0

## 2023-09-29 NOTE — PROGRESS NOTES
Patient coughing, oral airway and ET tube removed. Blood glucose was 193.  Patient is sleeping and does not appear to be in any pain

## 2023-09-29 NOTE — PROGRESS NOTES
Patient rested most of shift, no s/s of pain or discomfort noted. Tolerated fluids and x2 apple sauce. Dressing remains intact ice applied. [place] in place, bed in lowest position.

## 2023-09-29 NOTE — PROGRESS NOTES
4 Eyes Skin Assessment     NAME:  Dionte Castaneda  YOB: 1975  MEDICAL RECORD NUMBER:  [id number]    The patient is being assessed for  Post-Op Surgical    I agree that at least one RN has performed a thorough Head to Toe Skin Assessment on the patient. ALL assessment sites listed below have been assessed. Areas assessed by both nurses:    Head, Face, Ears, Shoulders, Back, Chest, Arms, Elbows, Hands, Sacrum. Buttock, Coccyx, Ischium, Legs. Feet and Heels, and Under Medical Devices         Does the Patient have a Wound?  No noted wound(s)       Bridger Prevention initiated by RN: No  Wound Care Orders initiated by RN: No    Pressure Injury (Stage 3,4, Unstageable, DTI, NWPT, and Complex wounds) if present, place Wound referral order by RN under : No    New Ostomies, if present place, Ostomy referral order under : No     Nurse 1 eSignature: Electronically signed by Blanca Auguste RN on 9/29/23 at 12:44 AM EDT    **SHARE this note so that the co-signing nurse can place an eSignature**    Nurse 2 eSignature: Electronically signed by Bobby Riley RN on 9/29/23 at 2:37 AM EDT

## 2023-09-29 NOTE — ANESTHESIA PROCEDURE NOTES
Peripheral Block    Patient location during procedure: OR  Reason for block: post-op pain management and at surgeon's request  Start time: 9/28/2023 9:18 PM  End time: 9/28/2023 10:05 PM  Staffing  Performed: anesthesiologist   Anesthesiologist: Reyna Ortiz MD  Performed by: Reyna Ortiz MD  Authorized by: Reyna Ortiz MD    Preanesthetic Checklist  Completed: IV checked and anesthesia consent given  Peripheral Block   Patient position: supine  Prep: ChloraPrep  Provider prep: sterile gloves  Patient monitoring: oxygen, IV access, frequent blood pressure checks, continuous capnometry, continuous pulse ox and cardiac monitor  Block type: Femoral  Femoral crease  Laterality: left  Injection technique: single-shot  Guidance: ultrasound guided    Needle   Needle type: short-bevel   Needle gauge: 20 G  Needle localization: ultrasound guidance  Needle length: 10 cm  Assessment   Injection assessment: negative aspiration for heme, no paresthesia on injection, local visualized surrounding nerve on ultrasound and no intravascular symptoms  Paresthesia pain: none  Slow fractionated injection: yes  Hemodynamics: stable  Outcomes: patient tolerated procedure well    Medications Administered  bupivacaine 0.25%-EPINEPHrine injection [id number] - Perineural   20 mL - 9/28/2023 9:18:00 PM

## 2023-09-29 NOTE — CARE COORDINATION
Writer reviewed chart day, patient had sx 9/28, Plan is for patient to back to his respite home with his caretaker. PT/OT needs to make recommendations. DCP will continue to follow.      Nuvia Chappell RN

## 2023-09-29 NOTE — PROGRESS NOTES
care.  Prognosis: Guarded  Decision Making: High Complexity  REQUIRES OT FOLLOW-UP: Yes  Activity Tolerance  Activity Tolerance: Treatment limited secondary to decreased cognition        Plan   Occupational Therapy Plan  Times Per Week: 4-6x  Current Treatment Recommendations: Functional mobility training, Self-Care / ADL, Balance training, ROM, Strengthening, Endurance training, Safety education & training, Equipment evaluation, education, & procurement, Patient/Caregiver education & training     Restrictions  Restrictions/Precautions  Restrictions/Precautions: Fall Risk, Weight Bearing  Lower Extremity Weight Bearing Restrictions  Left Lower Extremity Weight Bearing: Partial Weight Bearing  Partial Weight Bearing Percentage Or Pounds: 50%PWB on LLE  Position Activity Restriction  Other position/activity restrictions: Avasys, non-verbal, legally blind, candelario, tele    Subjective   General  Chart Reviewed: Yes, Operative Notes, Orders  Patient assessed for rehabilitation services?: Yes  Additional Pertinent Hx: Hx cerebral palsy  Family / Caregiver Present: No  Referring Practitioner: Shante Sanchez  Diagnosis: L subtrochanteric femur fx s/p L femur nailing 9/28/23  Subjective  Subjective: Pt agreeable to OT. Pt unable to communicate pain. General Comment  Comments: RN approved therapy     Social/Functional History  Social/Functional History  Lives With: Home care staff  Type of Home:  (Respite home with caregiver)  ADL Assistance: Needs assistance  Homemaking Responsibilities: No  Ambulation Assistance: Non-ambulatory (W/c bound)  Additional Comments: Above information provided by chart. Objective   Pulse: (!) 124  Heart Rate Source: Monitor  BP: 91/61  BP Location: Left upper arm  BP Method: Automatic  Patient Position: Semi fowlers  MAP (Calculated): 71  Respirations: 18  SpO2: 95 %  O2 Device: None (Room air)         Safety Devices  Type of Devices: All marco antonio prominences offloaded;Nurse notified; Heels elevated for goals : Pt did not provide     Therapy Time   Individual Concurrent Group Co-treatment   Time In 0823         Time Out 0843         Minutes 20         Timed Code Treatment Minutes: 10 Minutes (10 min eval)   If pt is unable to be seen after this session, please let this note serve as discharge summary. Please see case management note for discharge disposition. Thank you.   Elio Sandhu OT

## 2023-09-29 NOTE — PROGRESS NOTES
Patient to PACU from OR. Report received from anesthesia and RN . Patient with eyes closed,and stable on room air with oral airway and ET tube in place per anesthesia. SCD's in place; ICE applied. VS obtained and filed. Will continue to monitor.

## 2023-09-29 NOTE — PROGRESS NOTES
Physical Therapy  Facility/Department: [place] [address] - MED SURG/ORTHO  Physical Therapy Initial Assessment    Name: Yusra Mascorro  : 1975  MRN: [id number]  Date of Service: 2023    Discharge Recommendations: Other (comment) (Group home with assist )   PT Equipment Recommendations  Equipment Needed: No      Patient Diagnosis(es): The encounter diagnosis was Closed fracture of shaft of left femur, unspecified fracture morphology, initial encounter ([address]). Past Medical History:  has a past medical history of Acne, Allergic rhinitis, Blindness, legal, Constipation, Dehydration, Dermatitis, Diabetes mellitus ([address]), Epilepsy ([address]), GERD (gastroesophageal reflux disease), GERD (gastroesophageal reflux disease), Hearing loss, Hypokalemia, Hypothyroidism, Impulse control disorder, Incontinence, Insomnia, Mental retardation, Mental retardation, profound (I.Q. < 20), Microcephaly ([address]), MRSA (methicillin resistant staph aureus) culture positive, Non-verbal learning disorder, PAD (peripheral artery disease) ([address]), Pneumonia, Scoliosis, and Thyroid disease. Past Surgical History:  has a past surgical history that includes chest tube insertion; other surgical history (Right, --); and Leg Surgery (Left, 2023). Assessment   Body Structures, Functions, Activity Limitations Requiring Skilled Therapeutic Intervention: Decreased functional mobility ; Decreased ROM; Decreased body mechanics; Decreased strength;Decreased endurance;Decreased cognition; Increased pain;Decreased vision/visual deficit; Decreased posture;Decreased balance  Assessment: Pt admitted for L femur fx s/p L femur nailing. Pt has hx of cerebral palsy, deafness and is legally blind. He resides in group home with caregiver support. Pt is w/c bound at baseline and unable to perform functional mobility without assist. session was limited this date due to elevated HR and low BP--RN and MD aware.  Pt able to complete rolling in bed to improve Assistance: Needs assistance  Homemaking Responsibilities: No  Ambulation Assistance: Non-ambulatory (W/c bound)  Additional Comments: Above information provided by chart. Vision/Hearing  Vision  Vision: Impaired  Vision Exceptions: Legally blind  Hearing  Hearing: Exceptions to [facility] (RN reports pt is deaf)      Cognition   Orientation  Orientation Level: Unable to assess  Cognition  Cognition Comment: Inability to follow directions or communicate needs d/t hx of hearing and visual deficits     Objective   Objective   Pulse: (!) 124  Heart Rate Source: Monitor  BP: 91/61  BP Location: Left upper arm  BP Method: Automatic  Patient Position: Semi fowlers  MAP (Calculated): 71  Respirations: 18  SpO2: 95 %  O2 Device: None (Room air)     Observation/Palpation  Posture: Poor  Gross Assessment  AROM: Grossly decreased, non-functional  PROM: Grossly decreased, non-functional  Strength: Grossly decreased, non-functional  Coordination: Grossly decreased, non-functional  Tone: Normal  Sensation:  (MAKENZIE due to cognition)       Bed Mobility Training  Bed Mobility Training: Yes  Overall Level of Assistance: Total assistance  Interventions: Verbal cues; Tactile cues  Rolling: Total assistance;Assist X1 (x1 roll to L to improve positioning)                 PROM Exercises: Completed x5 reps of bilateral ankle PROM and knee PROM. Pt with increased ROM on RLE compared to LLE as pt resisted LLE knee ROM, likely due to hip pain and weakness. Pressure Relief Exercises: Completed repositioning in bed to improve pt posture and decrease risk for pressure ulcers. Pillows placed beneath bilateral legs to decrease internal rotation of RLE. Pillow placed on R side to decrease risk for skin breakdown.           AM-PAC Score  AM-PAC Inpatient Mobility Raw Score : 6 (09/29/23 1416)  AM-PAC Inpatient T-Scale Score : 23.55 (09/29/23 1416)  Mobility Inpatient CMS 0-100% Score: 100 (09/29/23 1416)  Mobility Inpatient CMS G-Code Modifier : CN

## 2023-09-29 NOTE — PROGRESS NOTES
Physician Progress Note      Lenora Car  [facility] #:                  [id number]  :                       1975  ADMIT DATE:       2023 3:34 AM  DISCH DATE:  RESPONDING  PROVIDER #:        Lelo Marshall MD          QUERY TEXT:    Pt admitted with Closed displaced left femur comminuted fracture. Noted H&P   \"Osteopenia? pathologic, Vitamin D deficiency\". If possible, please document   in progress notes and discharge summary if you are evaluating and/or treating   any of the following: The medical record reflects the following:    Risk Factors:  Vitamin D deficiency, anticonvulsants, PPI    Clinical Indicators:  H&P state Closed displaced left femur comminuted fracture, Osteopenia? pathologic, Vitamin D deficiency. Femur X ray Acute oblique fractures with   comminution and displacement in the proximal femoral shaft coursing up to the   level of the lesser trochanter    Treatment: Vitamin D3 (CHOLECALCIFEROL) tablet 400 Unit, ORIF, Femur X ray,   Ortho consult    Thank you,  Radha Bah RN, BSN, CRCR, CCDS, SMART  Clinical Documentation Improvement  [email or web address]  [phone] or via [employer]  Options provided:  -- Pathological Closed displaced left femur comminuted fracture due to   osteopenia  -- Traumatic Closed displaced left femur comminuted fracture  -- Other - I will add my own diagnosis  -- Disagree - Not applicable / Not valid  -- Disagree - Clinically unable to determine / Unknown  -- Refer to Clinical Documentation Reviewer    PROVIDER RESPONSE TEXT:    This patient has a traumatic Closed displaced left femur comminuted fracture.     Query created by: Radha Bah on 2023 7:53 AM      Electronically signed by:  Lelo Marshall MD 2023 7:11 PM

## 2023-09-29 NOTE — ANESTHESIA POSTPROCEDURE EVALUATION
Department of Anesthesiology  Postprocedure Note    Patient: Nga Ndiaye  MRN: [id number]  YOB: 1975  Date of evaluation: 9/28/2023      Procedure Summary     Date: 09/28/23 Room / Location: [address] [place] [address]    Anesthesia Start: 1915 Anesthesia Stop: 2151    Procedure: LEFT FEMORAL NAILING (Left: Hip) Diagnosis:       Closed nondisplaced subtrochanteric fracture of left femur, initial encounter ([address])      (Closed nondisplaced subtrochanteric fracture of left femur, initial encounter ([facility]) [S72.25XA])    Surgeons: Christy Goodwin MD Responsible Provider: Juanita Chi MD    Anesthesia Type: general ASA Status: 3          Anesthesia Type: No value filed.     Yessy Phase I: Yessy Score: 5    Yessy Phase II:        Anesthesia Post Evaluation    Patient location during evaluation: PACU  Level of consciousness: lethargic  Airway patency: patent  Nausea & Vomiting: no vomiting  Complications: no  Cardiovascular status: blood pressure returned to baseline  Respiratory status: acceptable  Hydration status: stable  Pain management: adequate

## 2023-09-29 NOTE — OP NOTE
Operative Note      Patient: Baljeet Jasso  YOB: 1975  MRN: [id number]    Date of Procedure: 9/28/2023    Pre-Op Diagnosis Codes:     * Closed segmental subtrochanteric fracture of left femur, initial encounter    Post-Op Diagnosis: Same       Procedure(s):  LEFT FEMUR CEPHALOMEDULLARY NAILING    Surgeon(s):  Cielo Joel MD    Assistant:   Surgical Assistant: Rebekah Stringer    Anesthesia: General    Estimated Blood Loss (mL): 278    Complications: None    Specimens:   * No specimens in log *    Implants:  Implant Name Type Inv. Item Serial No.  Lot No. LRB No. Used Action   CABLE SURG L635MM DIA1. 8MM CO CHROM CERCLAGE CBL RDY - [id number]  CABLE SURG L635MM DIA1. 8MM CO CHROM CERCLAGE CBL RDY  [employer]L [employer]-WD [id number] Left 2 Implanted   NAIL IM L360MM [id number] 130DEG LNG L PROX FEM GRN TI ANGELLA - [id number]  NAIL IM L360MM [id number] 130DEG LNG L PROX FEM GRN TI ANGELLA  DEPUY SYNTHES [employer]-WD [id number] Left 1 Implanted   SCREW BNE L75MM DIA10.35MM G TI ANGELLA PERF FOR PROX FEM - [id number]  SCREW BNE L75MM DIA10.35MM G TI ANGELLA PERF FOR PROX FEM  DEPUY [employer]-WD [id number] Left 1 Implanted   SCREW BNE L42MM DIA5MM TIB LT GRN TI ST ANGELLA FRANCES FULL THRD - [id number]  SCREW BNE L42MM DIA5MM TIB LT GRN TI ST ANGELLA FRANCES FULL THRD  DEPUY [employer]-WD [id number] Left 1 Implanted   SCREW BNE L40MM DIA5MM ST TIB LT GRN TI ST ANGELLA FRANCES FULL - [id number]  SCREW BNE L40MM DIA5MM ST TIB LT GRN TI ST ANGELLA FRANCES FULL  DEPUY SYNTH[employer]-WD [id number] Left 1 Implanted         Drains: * No LDAs found *    Findings:  segmental comminuted subtrochanteric femur fracture    Detailed Description of Procedure: The patient was positively identified in the preoperative holding area by the attending surgeon. Informed consent was verified and placed on the chart. The left lower extremity was marked appropriately. He was given 2 g IV Ancef for antibiotic prophylaxis.  He was then taken to the operating room by the anesthesia

## 2023-09-29 NOTE — PROGRESS NOTES
Patient continues to rest with eyes closed. No signs of distress noted. It appears patient does not like to be cold. Grimaces with stethoscope on chest. Multiple warm blankets placed on patient.

## 2023-09-29 NOTE — DISCHARGE INSTRUCTIONS
Keep dressing clean and dry. Change Mepilex every 3-5 days or as needed for excess drainage. Please call physician if increased redness around incision, increased drainage, fevers, or pain becomes significantly worse. Do not immerse in water such as baths but okay to shower with dressing on to protect wound. F/U with Dr Radha Hernández in 10-14 days. Call [facility] for appointment time and date for follow up at [phone]. Weight Bearing on Surgical Side: 50% weightbearing for transfers    Continue DVT Prophylaxis:  Lovenox 40 mg daily for 30 days postop    Pain Medications  You were given oxycodone (Oxycontin, Oxyir)  Wean off pain medications as you deem appropriate as long as pain is under control  Be sure to drink plenty of fluids (recommend water) while taking narcotic pain medications to prevent constipation   You may take an over the counter laxative or stool softener as needed to prevent/treat constipation as well, we recommend Senokot S OTC. We recommend that you consider taking these medications the entire time you are taking pain medication. Cold packs/Ice packs/Machine  May be used as much as necessary to control swelling/inflammation/soreness  Be sure to have a barrier (cloth, clothing, towel) between the site and the ice pack to prevent [address] [facility] office [phone] if  Increased redness, swelling, drainage of any kind, and/or pain to surgery site. As well as new onset fevers and or chills. These could signify an infection. Calf or thigh tenderness to touch as well as increased swelling or redness. This could signify a clot formation. Numbness or tingling to an area around the incision site or below the incision site (toes). Any rash appears, increased  or new onset nausea/vomiting occur. This may indicate a reaction to a medication.

## 2023-09-29 NOTE — DISCHARGE INSTR - COC
Continuity of Care Form    Patient Name: Melani Linn   :  1975  MRN:  [id number]    Admit date:  2023  Discharge date:  10/5/2023    Code Status Order: Full Code   Advance Directives:     Admitting Physician:  Victorina Gamez MD  PCP: Rohit Tolentino MD    Discharging Nurse: [facility] Unit/Room#: 0009/8977-03  Discharging Unit Phone Number: [phone]    Emergency Contact:   Extended Emergency Contact Information  Primary Emergency Contact: Lucia Stringer  Address: [address], [address] [facility] [place] [place] [address] [address] Phone: [phone]  Mobile Phone: [phone]  Relation: Legal Guardian  Secondary Emergency Contact: [address] Phone: [phone]  Relation: Aunt/Uncle    Past Surgical History:  Past Surgical History:   Procedure Laterality Date    CHEST TUBE INSERTION      upper lobes bilat; when born    LEG SURGERY Left 2023    LEFT FEMORAL NAILING performed by Kellee Castorena MD at [address] [place] [address] Right 16    excision thumb mass       Immunization History:   Immunization History   Administered Date(s) Administered    Influenza Virus Vaccine 10/08/2011    Influenza, Krysten Reeder, 6 mo and older, IM, PF (Flulaval, Fluarix) 2018    Pneumococcal, PPSV23, PNEUMOVAX 21, (age 2y+), SC/IM, 0.5mL 2012    TDaP, ADACEL (age 6y-58y), BOOSTRIX (age 10y+), IM, 0.5mL 2017       Active Problems:  Patient Active Problem List   Diagnosis Code    Epilepsy ([address] [place] [address]) G40.909    Mental retardation F79    Blindness, legal 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.8    Aspiration pneumonia ([facility]) J69.0    DM2 (diabetes mellitus, type 2) ([address] [place] [address]) E11.9    Sepsis ([address] [place] [address]) A41.9    DM (diabetes mellitus) ([address] [place] [address]) E11.9    CAP (community acquired pneumonia) J18.9    Infection of thumb L08.9    Hypernatremia E87.0    Acute hypernatremia E87.0    Acute encephalopathy G93.40    Hypokalemia E87.6    Seizure disorder ([facility]) G40.909    Hyperglycemia R73.9    Abnormal finding on GI

## 2023-09-29 NOTE — PROGRESS NOTES
Pt's mother at bedside updated her on POC. Per mother he needs a sitter at bedside when she is gone because he is hits  himself when he is in pain. Side rails padded for seizure protocol. Spoke with [place] juan LUIS regarding getting oral pain meds. BP better after fluid bolus. Messaged Azael Loving Abirached DO resident regarding status and need for a sitter.

## 2023-09-30 LAB
ANION GAP SERPL CALCULATED.3IONS-SCNC: 8 MMOL/L (ref 3–16)
BASOPHILS # BLD: 0 K/UL (ref 0–0.2)
BASOPHILS NFR BLD: 0.4 %
BUN SERPL-MCNC: 17 MG/DL (ref 7–20)
CALCIUM SERPL-MCNC: 8.3 MG/DL (ref 8.3–10.6)
CHLORIDE SERPL-SCNC: 102 MMOL/L (ref 99–110)
CO2 SERPL-SCNC: 27 MMOL/L (ref 21–32)
CREAT SERPL-MCNC: 0.9 MG/DL (ref 0.9–1.3)
DEPRECATED RDW RBC AUTO: 14.7 % (ref 12.4–15.4)
EOSINOPHIL # BLD: 0 K/UL (ref 0–0.6)
EOSINOPHIL NFR BLD: 0.6 %
GFR SERPLBLD CREATININE-BSD FMLA CKD-EPI: >60 ML/MIN/{1.73_M2}
GLUCOSE BLD-MCNC: 117 MG/DL (ref 70–99)
GLUCOSE BLD-MCNC: 171 MG/DL (ref 70–99)
GLUCOSE BLD-MCNC: 211 MG/DL (ref 70–99)
GLUCOSE BLD-MCNC: 228 MG/DL (ref 70–99)
GLUCOSE BLD-MCNC: 235 MG/DL (ref 70–99)
GLUCOSE BLD-MCNC: 268 MG/DL (ref 70–99)
GLUCOSE SERPL-MCNC: 251 MG/DL (ref 70–99)
HCT VFR BLD AUTO: 26 % (ref 40.5–52.5)
HCT VFR BLD AUTO: 26.4 % (ref 40.5–52.5)
HCT VFR BLD AUTO: 26.6 % (ref 40.5–52.5)
HGB BLD-MCNC: 8.7 G/DL (ref 13.5–17.5)
HGB BLD-MCNC: 8.7 G/DL (ref 13.5–17.5)
HGB BLD-MCNC: 8.9 G/DL (ref 13.5–17.5)
LYMPHOCYTES # BLD: 2.1 K/UL (ref 1–5.1)
LYMPHOCYTES NFR BLD: 34.6 %
MCH RBC QN AUTO: 29.8 PG (ref 26–34)
MCHC RBC AUTO-ENTMCNC: 33.5 G/DL (ref 31–36)
MCV RBC AUTO: 88.9 FL (ref 80–100)
MONOCYTES # BLD: 0.6 K/UL (ref 0–1.3)
MONOCYTES NFR BLD: 9.5 %
NEUTROPHILS # BLD: 3.3 K/UL (ref 1.7–7.7)
NEUTROPHILS NFR BLD: 54.9 %
PERFORMED ON: ABNORMAL
PLATELET # BLD AUTO: 123 K/UL (ref 135–450)
PMV BLD AUTO: 7.4 FL (ref 5–10.5)
POTASSIUM SERPL-SCNC: 3.8 MMOL/L (ref 3.5–5.1)
RBC # BLD AUTO: 3 M/UL (ref 4.2–5.9)
SODIUM SERPL-SCNC: 137 MMOL/L (ref 136–145)
WBC # BLD AUTO: 6 K/UL (ref 4–11)

## 2023-09-30 PROCEDURE — C9113 INJ PANTOPRAZOLE SODIUM, VIA: HCPCS | Performed by: SURGERY

## 2023-09-30 PROCEDURE — 36415 COLL VENOUS BLD VENIPUNCTURE: CPT

## 2023-09-30 PROCEDURE — 6360000002 HC RX W HCPCS: Performed by: ORTHOPAEDIC SURGERY

## 2023-09-30 PROCEDURE — 80048 BASIC METABOLIC PNL TOTAL CA: CPT

## 2023-09-30 PROCEDURE — 97110 THERAPEUTIC EXERCISES: CPT

## 2023-09-30 PROCEDURE — 6360000002 HC RX W HCPCS: Performed by: SURGERY

## 2023-09-30 PROCEDURE — 97530 THERAPEUTIC ACTIVITIES: CPT

## 2023-09-30 PROCEDURE — 2580000003 HC RX 258: Performed by: ORTHOPAEDIC SURGERY

## 2023-09-30 PROCEDURE — 6370000000 HC RX 637 (ALT 250 FOR IP): Performed by: ORTHOPAEDIC SURGERY

## 2023-09-30 PROCEDURE — 6370000000 HC RX 637 (ALT 250 FOR IP): Performed by: SPECIALIST/TECHNOLOGIST

## 2023-09-30 PROCEDURE — 1200000000 HC SEMI PRIVATE

## 2023-09-30 PROCEDURE — 85014 HEMATOCRIT: CPT

## 2023-09-30 PROCEDURE — 6370000000 HC RX 637 (ALT 250 FOR IP)

## 2023-09-30 PROCEDURE — 51798 US URINE CAPACITY MEASURE: CPT

## 2023-09-30 PROCEDURE — 6370000000 HC RX 637 (ALT 250 FOR IP): Performed by: INTERNAL MEDICINE

## 2023-09-30 PROCEDURE — 6370000000 HC RX 637 (ALT 250 FOR IP): Performed by: NURSE PRACTITIONER

## 2023-09-30 PROCEDURE — 85025 COMPLETE CBC W/AUTO DIFF WBC: CPT

## 2023-09-30 PROCEDURE — 85018 HEMOGLOBIN: CPT

## 2023-09-30 RX ORDER — INSULIN GLARGINE 100 [IU]/ML
22 INJECTION, SOLUTION SUBCUTANEOUS NIGHTLY
Status: DISCONTINUED | OUTPATIENT
Start: 2023-09-30 | End: 2023-09-30

## 2023-09-30 RX ORDER — INSULIN GLARGINE 100 [IU]/ML
14 INJECTION, SOLUTION SUBCUTANEOUS NIGHTLY
Status: DISCONTINUED | OUTPATIENT
Start: 2023-09-30 | End: 2023-10-05 | Stop reason: HOSPADM

## 2023-09-30 RX ADMIN — INSULIN LISPRO 2 UNITS: 100 INJECTION, SOLUTION INTRAVENOUS; SUBCUTANEOUS at 12:39

## 2023-09-30 RX ADMIN — ZONISAMIDE 400 MG: 100 CAPSULE ORAL at 20:35

## 2023-09-30 RX ADMIN — INSULIN GLARGINE 14 UNITS: 100 INJECTION, SOLUTION SUBCUTANEOUS at 20:17

## 2023-09-30 RX ADMIN — SERTRALINE 50 MG: 50 TABLET, FILM COATED ORAL at 20:17

## 2023-09-30 RX ADMIN — ACETAMINOPHEN 650 MG: 325 TABLET ORAL at 17:20

## 2023-09-30 RX ADMIN — ENOXAPARIN SODIUM 40 MG: 100 INJECTION SUBCUTANEOUS at 08:48

## 2023-09-30 RX ADMIN — CARBAMAZEPINE 200 MG: 100 TABLET, CHEWABLE ORAL at 20:17

## 2023-09-30 RX ADMIN — CHOLECALCIFEROL (VITAMIN D3) 10 MCG (400 UNIT) TABLET 400 UNITS: at 08:54

## 2023-09-30 RX ADMIN — OXYCODONE HYDROCHLORIDE 10 MG: 5 TABLET ORAL at 03:51

## 2023-09-30 RX ADMIN — CARBAMAZEPINE 200 MG: 100 TABLET, CHEWABLE ORAL at 17:20

## 2023-09-30 RX ADMIN — SIMETHICONE 80 MG: 80 TABLET, CHEWABLE ORAL at 08:54

## 2023-09-30 RX ADMIN — ACETAMINOPHEN 650 MG: 325 TABLET ORAL at 01:34

## 2023-09-30 RX ADMIN — SIMETHICONE 80 MG: 80 TABLET, CHEWABLE ORAL at 01:34

## 2023-09-30 RX ADMIN — GABAPENTIN 300 MG: 300 CAPSULE ORAL at 17:20

## 2023-09-30 RX ADMIN — GABAPENTIN 300 MG: 300 CAPSULE ORAL at 08:54

## 2023-09-30 RX ADMIN — SODIUM CHLORIDE, PRESERVATIVE FREE 10 ML: 5 INJECTION INTRAVENOUS at 08:51

## 2023-09-30 RX ADMIN — PANTOPRAZOLE SODIUM 40 MG: 40 INJECTION, POWDER, FOR SOLUTION INTRAVENOUS at 08:49

## 2023-09-30 RX ADMIN — OXYCODONE HYDROCHLORIDE 10 MG: 5 TABLET ORAL at 20:35

## 2023-09-30 RX ADMIN — ACETAMINOPHEN 650 MG: 325 TABLET ORAL at 06:45

## 2023-09-30 RX ADMIN — GABAPENTIN 300 MG: 300 CAPSULE ORAL at 20:17

## 2023-09-30 RX ADMIN — DOCUSATE SODIUM 100 MG: 100 CAPSULE, LIQUID FILLED ORAL at 20:17

## 2023-09-30 RX ADMIN — LAMOTRIGINE 400 MG: 100 TABLET ORAL at 08:54

## 2023-09-30 RX ADMIN — ASPIRIN 81 MG: 81 TABLET, CHEWABLE ORAL at 08:54

## 2023-09-30 RX ADMIN — SODIUM CHLORIDE, PRESERVATIVE FREE 10 ML: 5 INJECTION INTRAVENOUS at 20:18

## 2023-09-30 RX ADMIN — CARBAMAZEPINE 200 MG: 100 TABLET, CHEWABLE ORAL at 08:54

## 2023-09-30 RX ADMIN — LEVOTHYROXINE SODIUM 75 MCG: 0.03 TABLET ORAL at 06:44

## 2023-09-30 RX ADMIN — SIMETHICONE 80 MG: 80 TABLET, CHEWABLE ORAL at 20:17

## 2023-09-30 RX ADMIN — INSULIN LISPRO 4 UNITS: 100 INJECTION, SOLUTION INTRAVENOUS; SUBCUTANEOUS at 08:47

## 2023-09-30 RX ADMIN — LAMOTRIGINE 450 MG: 100 TABLET ORAL at 20:16

## 2023-09-30 ASSESSMENT — PAIN SCALES - GENERAL
PAINLEVEL_OUTOF10: 8
PAINLEVEL_OUTOF10: 0
PAINLEVEL_OUTOF10: 7
PAINLEVEL_OUTOF10: 0
PAINLEVEL_OUTOF10: 0

## 2023-09-30 ASSESSMENT — PAIN SCALES - WONG BAKER
WONGBAKER_NUMERICALRESPONSE: 0
WONGBAKER_NUMERICALRESPONSE: 0
WONGBAKER_NUMERICALRESPONSE: 4

## 2023-09-30 ASSESSMENT — PAIN DESCRIPTION - DESCRIPTORS: DESCRIPTORS: OTHER (COMMENT)

## 2023-09-30 ASSESSMENT — PAIN - FUNCTIONAL ASSESSMENT: PAIN_FUNCTIONAL_ASSESSMENT: ACTIVITIES ARE NOT PREVENTED

## 2023-09-30 ASSESSMENT — PAIN DESCRIPTION - ORIENTATION: ORIENTATION: LEFT

## 2023-09-30 ASSESSMENT — PAIN DESCRIPTION - LOCATION
LOCATION: LEG
LOCATION: LEG

## 2023-09-30 NOTE — PROGRESS NOTES
Pt was able to take PO meds crushed in pudding for morning and evening meds but refused to take any meds at lunch and has had very poor PO intake. Message sent to attending and received order for neuro consult to see if there is an IV seizure medication we can give if and when patient is refusing his PO meds.

## 2023-09-30 NOTE — PROGRESS NOTES
Physical Therapy  Facility/Department: [facility] C5 - MED SURG/ORTHO  Daily Treatment Note  NAME: Erica Patten  : 1975  MRN: [id number]    Date of Service: 2023    Discharge Recommendations: Other (comment) (Group home with assist )   PT Equipment Recommendations  Equipment Needed: No    Patient Diagnosis(es): The encounter diagnosis was Closed fracture of shaft of left femur, unspecified fracture morphology, initial encounter ([address]). Assessment   Assessment: Pt required max x2 for repositioning and rolling in bed for pressure relief. Pt tolerated 8-10reps LE ROM exercises. Further attempts at activity was limited by pt appearing to become uncomfortable with movement. Recommend pt return to group home upon d/c with  caregiver assist.  Activity Tolerance: Patient tolerated treatment well  Equipment Needed: No     Plan    Physcial Therapy Plan  General Plan: 5-7 times per week  Current Treatment Recommendations: Strengthening;Transfer training;Functional mobility training;Balance training;Gait training; Wheelchair mobility training; Endurance training;Home exercise program;Patient/Caregiver education & training; Safety education & training;Positioning; Therapeutic activities     Restrictions  Restrictions/Precautions  Restrictions/Precautions: Fall Risk, Weight Bearing, Swallowing - Thickened Liquids  Lower Extremity Weight Bearing Restrictions  Left Lower Extremity Weight Bearing: Partial Weight Bearing  Partial Weight Bearing Percentage Or Pounds: 50% PWB on LLE  Position Activity Restriction  Other position/activity restrictions: Avasys, non-verbal, legally blind, candelario, tele     Subjective    Subjective  Subjective: Pt resting in bed, sitter present  Pain: Unable to verbalize pain  Orientation  Orientation Level: Unable to assess  Cognition  Cognition Comment: Inability to follow directions or communicate needs d/t hx of hearing and visual deficits     Objective   Vitals     Vitals:    23

## 2023-09-30 NOTE — PROGRESS NOTES
Patient blood sugar low at HS, he was alert given total of 8 oz juice blood sugar increase to 67. Bolus dextrose given patient tolerated well, blood sugar increased to 127. On call Nando Sahu NP made aware order given to hold Lantus tonight as patient appetite has been poor. Sitter at bedside patient resting.

## 2023-09-30 NOTE — PROGRESS NOTES
Occupational Therapy  Facility/Department: [facility] C5 - MED SURG/ORTHO  Daily Treatment Note  NAME: Yusra Mascorro  : 1975  MRN: [id number]    Date of Service: 2023    Discharge Recommendations:  24 hour supervision or assist (return to group home setting)     AM-PAC Daily Activity - Inpatient   How much help is needed for putting on and taking off regular lower body clothing?: Total  How much help is needed for bathing (which includes washing, rinsing, drying)?: Total  How much help is needed for toileting (which includes using toilet, bedpan, or urinal)?: Total  How much help is needed for putting on and taking off regular upper body clothing?: Total  How much help is needed for taking care of personal grooming?: Total  How much help for eating meals?: Total  AM-PAC Inpatient Daily Activity Raw Score: 6  AM-PAC Inpatient ADL T-Scale Score : 17.07  ADL Inpatient CMS 0-100% Score: 100  ADL Inpatient CMS G-Code Modifier : CN    Patient Diagnosis(es): The encounter diagnosis was Closed fracture of shaft of left femur, unspecified fracture morphology, initial encounter ([address] [facility]). Assessment    Assessment: Pt seen for OT tx. Due to sensory deficits, pt was notified of therapy's presence by touching L arm and R leg. He required max x2 for repositioning and rolling in bed for pressure relief. Pt tolerated 5 reps UE ROM exercises. Further attempts at activity was limited by pt appearing to become uncomfortable with movement. Recommend return to group home with 24/ support. Cont OT in acute care to provide UE exercises and mobility as tolerated. Cont OT in acute care. Co-tx collaboration this date to safely meet goals and will have better occupational performance outcomes with in a co-treatment than 1:1 session.     Activity Tolerance: Patient tolerated evaluation without incident  Discharge Recommendations: 24 hour supervision or assist (return to group home setting)      Plan   Occupational Therapy Plan  Times allowed LUE ROM exercises. He was resistive to RUE motion--exercises performed within pt's tolerance. Pt tends to posture in BUE in flexed position. Safety Devices  Type of Devices: All marco antonio prominences offloaded;Nurse notified; Heels elevated for pressure relief;Bed alarm in place;Call light within reach; Left in bed;Sitter present; All fall risk precautions in place     Patient Education  Education Given To: Patient  Education Provided Comments: Pt unable to participate in education d/t baseline deficits. Education Outcome: Unable to verbalize; Unable to demonstrate understanding    Goals  Short Term Goals  Time Frame for Short Term Goals: 1 week (10/6) unless noted--GOALS ONGOING 9/30/23  Short Term Goal 1: Sit EOB x3 min with mod A for sitting balance by 10/4  Short Term Goal 2: Roll side to side in bed with max x1 for ADL tasks and pressure relief  Short Term Goal 3: Participate in UE AAROM 10-15x each  Patient Goals   Patient goals : Pt did not provide     Therapy Time   Individual Concurrent Group Co-treatment   Time In       0927   Time Out       0950   Minutes       23   Timed Code Treatment Minutes: 23 Minutes   If pt is discharged prior to next OT session, this note will serve as the discharge summary.   Lubna Wing OT

## 2023-10-01 LAB
ANION GAP SERPL CALCULATED.3IONS-SCNC: 8 MMOL/L (ref 3–16)
BASOPHILS # BLD: 0 K/UL (ref 0–0.2)
BASOPHILS NFR BLD: 0.1 %
BUN SERPL-MCNC: 14 MG/DL (ref 7–20)
CALCIUM SERPL-MCNC: 8.3 MG/DL (ref 8.3–10.6)
CHLORIDE SERPL-SCNC: 101 MMOL/L (ref 99–110)
CO2 SERPL-SCNC: 26 MMOL/L (ref 21–32)
CREAT SERPL-MCNC: 0.7 MG/DL (ref 0.9–1.3)
DEPRECATED RDW RBC AUTO: 14.3 % (ref 12.4–15.4)
EOSINOPHIL # BLD: 0 K/UL (ref 0–0.6)
EOSINOPHIL NFR BLD: 0.8 %
GFR SERPLBLD CREATININE-BSD FMLA CKD-EPI: >60 ML/MIN/{1.73_M2}
GLUCOSE BLD-MCNC: 209 MG/DL (ref 70–99)
GLUCOSE BLD-MCNC: 221 MG/DL (ref 70–99)
GLUCOSE BLD-MCNC: 262 MG/DL (ref 70–99)
GLUCOSE BLD-MCNC: 281 MG/DL (ref 70–99)
GLUCOSE BLD-MCNC: 287 MG/DL (ref 70–99)
GLUCOSE SERPL-MCNC: 265 MG/DL (ref 70–99)
HCT VFR BLD AUTO: 23.9 % (ref 40.5–52.5)
HGB BLD-MCNC: 8 G/DL (ref 13.5–17.5)
LYMPHOCYTES # BLD: 1.3 K/UL (ref 1–5.1)
LYMPHOCYTES NFR BLD: 23.7 %
MCH RBC QN AUTO: 29.6 PG (ref 26–34)
MCHC RBC AUTO-ENTMCNC: 33.5 G/DL (ref 31–36)
MCV RBC AUTO: 88.2 FL (ref 80–100)
MONOCYTES # BLD: 0.4 K/UL (ref 0–1.3)
MONOCYTES NFR BLD: 7 %
NEUTROPHILS # BLD: 3.6 K/UL (ref 1.7–7.7)
NEUTROPHILS NFR BLD: 68.4 %
PERFORMED ON: ABNORMAL
PLATELET # BLD AUTO: 125 K/UL (ref 135–450)
PMV BLD AUTO: 7.3 FL (ref 5–10.5)
POTASSIUM SERPL-SCNC: 3.6 MMOL/L (ref 3.5–5.1)
RBC # BLD AUTO: 2.71 M/UL (ref 4.2–5.9)
SODIUM SERPL-SCNC: 135 MMOL/L (ref 136–145)
WBC # BLD AUTO: 5.3 K/UL (ref 4–11)

## 2023-10-01 PROCEDURE — 6370000000 HC RX 637 (ALT 250 FOR IP): Performed by: NURSE PRACTITIONER

## 2023-10-01 PROCEDURE — 6370000000 HC RX 637 (ALT 250 FOR IP): Performed by: ORTHOPAEDIC SURGERY

## 2023-10-01 PROCEDURE — 80048 BASIC METABOLIC PNL TOTAL CA: CPT

## 2023-10-01 PROCEDURE — C9254 INJECTION, LACOSAMIDE: HCPCS | Performed by: PSYCHIATRY & NEUROLOGY

## 2023-10-01 PROCEDURE — 6370000000 HC RX 637 (ALT 250 FOR IP)

## 2023-10-01 PROCEDURE — 36415 COLL VENOUS BLD VENIPUNCTURE: CPT

## 2023-10-01 PROCEDURE — APPNB60 APP NON BILLABLE TIME 46-60 MINS: Performed by: PHYSICIAN ASSISTANT

## 2023-10-01 PROCEDURE — 6370000000 HC RX 637 (ALT 250 FOR IP): Performed by: INTERNAL MEDICINE

## 2023-10-01 PROCEDURE — 2580000003 HC RX 258: Performed by: ORTHOPAEDIC SURGERY

## 2023-10-01 PROCEDURE — 97535 SELF CARE MNGMENT TRAINING: CPT

## 2023-10-01 PROCEDURE — 97530 THERAPEUTIC ACTIVITIES: CPT

## 2023-10-01 PROCEDURE — C9113 INJ PANTOPRAZOLE SODIUM, VIA: HCPCS | Performed by: SURGERY

## 2023-10-01 PROCEDURE — 6370000000 HC RX 637 (ALT 250 FOR IP): Performed by: SPECIALIST/TECHNOLOGIST

## 2023-10-01 PROCEDURE — 6360000002 HC RX W HCPCS: Performed by: ORTHOPAEDIC SURGERY

## 2023-10-01 PROCEDURE — 2580000003 HC RX 258: Performed by: PSYCHIATRY & NEUROLOGY

## 2023-10-01 PROCEDURE — 6360000002 HC RX W HCPCS: Performed by: PSYCHIATRY & NEUROLOGY

## 2023-10-01 PROCEDURE — 6360000002 HC RX W HCPCS: Performed by: SURGERY

## 2023-10-01 PROCEDURE — 85025 COMPLETE CBC W/AUTO DIFF WBC: CPT

## 2023-10-01 PROCEDURE — 1200000000 HC SEMI PRIVATE

## 2023-10-01 RX ADMIN — INSULIN LISPRO 4 UNITS: 100 INJECTION, SOLUTION INTRAVENOUS; SUBCUTANEOUS at 09:51

## 2023-10-01 RX ADMIN — SIMETHICONE 80 MG: 80 TABLET, CHEWABLE ORAL at 23:18

## 2023-10-01 RX ADMIN — ZONISAMIDE 400 MG: 100 CAPSULE ORAL at 23:17

## 2023-10-01 RX ADMIN — INSULIN LISPRO 2 UNITS: 100 INJECTION, SOLUTION INTRAVENOUS; SUBCUTANEOUS at 12:46

## 2023-10-01 RX ADMIN — LEVOTHYROXINE SODIUM 75 MCG: 0.03 TABLET ORAL at 05:15

## 2023-10-01 RX ADMIN — SERTRALINE 50 MG: 50 TABLET, FILM COATED ORAL at 23:18

## 2023-10-01 RX ADMIN — CARBAMAZEPINE 200 MG: 100 TABLET, CHEWABLE ORAL at 09:52

## 2023-10-01 RX ADMIN — GABAPENTIN 300 MG: 300 CAPSULE ORAL at 14:27

## 2023-10-01 RX ADMIN — ACETAMINOPHEN 650 MG: 325 TABLET ORAL at 23:21

## 2023-10-01 RX ADMIN — GABAPENTIN 300 MG: 300 CAPSULE ORAL at 17:46

## 2023-10-01 RX ADMIN — ASPIRIN 81 MG: 81 TABLET, CHEWABLE ORAL at 09:51

## 2023-10-01 RX ADMIN — ACETAMINOPHEN 650 MG: 325 TABLET ORAL at 09:52

## 2023-10-01 RX ADMIN — PANTOPRAZOLE SODIUM 40 MG: 40 INJECTION, POWDER, FOR SOLUTION INTRAVENOUS at 09:51

## 2023-10-01 RX ADMIN — LACOSAMIDE 150 MG: 10 INJECTION INTRAVENOUS at 10:13

## 2023-10-01 RX ADMIN — SIMETHICONE 80 MG: 80 TABLET, CHEWABLE ORAL at 09:52

## 2023-10-01 RX ADMIN — SODIUM CHLORIDE, PRESERVATIVE FREE 10 ML: 5 INJECTION INTRAVENOUS at 23:19

## 2023-10-01 RX ADMIN — SIMETHICONE 80 MG: 80 TABLET, CHEWABLE ORAL at 14:27

## 2023-10-01 RX ADMIN — INSULIN LISPRO 4 UNITS: 100 INJECTION, SOLUTION INTRAVENOUS; SUBCUTANEOUS at 17:46

## 2023-10-01 RX ADMIN — SODIUM CHLORIDE, PRESERVATIVE FREE 10 ML: 5 INJECTION INTRAVENOUS at 09:53

## 2023-10-01 RX ADMIN — CARBAMAZEPINE 200 MG: 100 TABLET, CHEWABLE ORAL at 14:27

## 2023-10-01 RX ADMIN — CARBAMAZEPINE 200 MG: 100 TABLET, CHEWABLE ORAL at 17:46

## 2023-10-01 RX ADMIN — CHOLECALCIFEROL (VITAMIN D3) 10 MCG (400 UNIT) TABLET 400 UNITS: at 09:51

## 2023-10-01 RX ADMIN — ENOXAPARIN SODIUM 40 MG: 100 INJECTION SUBCUTANEOUS at 09:51

## 2023-10-01 RX ADMIN — LACOSAMIDE 150 MG: 10 INJECTION INTRAVENOUS at 23:35

## 2023-10-01 RX ADMIN — INSULIN GLARGINE 14 UNITS: 100 INJECTION, SOLUTION SUBCUTANEOUS at 23:18

## 2023-10-01 RX ADMIN — CARBAMAZEPINE 200 MG: 100 TABLET, CHEWABLE ORAL at 23:17

## 2023-10-01 RX ADMIN — GABAPENTIN 300 MG: 300 CAPSULE ORAL at 23:18

## 2023-10-01 RX ADMIN — SODIUM CHLORIDE 25 ML/HR: 9 INJECTION, SOLUTION INTRAVENOUS at 10:12

## 2023-10-01 RX ADMIN — ACETAMINOPHEN 650 MG: 325 TABLET ORAL at 17:46

## 2023-10-01 RX ADMIN — ACETAMINOPHEN 650 MG: 325 TABLET ORAL at 05:15

## 2023-10-01 RX ADMIN — LAMOTRIGINE 400 MG: 100 TABLET ORAL at 09:51

## 2023-10-01 RX ADMIN — LAMOTRIGINE 450 MG: 100 TABLET ORAL at 23:17

## 2023-10-01 RX ADMIN — GABAPENTIN 300 MG: 300 CAPSULE ORAL at 09:52

## 2023-10-01 ASSESSMENT — PAIN SCALES - WONG BAKER: WONGBAKER_NUMERICALRESPONSE: 0

## 2023-10-01 NOTE — PROGRESS NOTES
Neurology is consulted for seizure management as the patient has not been available for PO medication. Plan:  Lacosamide 150 mg IV q12hr. Lorazepam 2 mg PRN for breakthrough seizure. May need EEG on Monday to assess the seizure tendency after Lacosamide. Full note for further details.

## 2023-10-01 NOTE — PROGRESS NOTES
Occupational Therapy  Facility/Department: [facility] C5 - MED SURG/ORTHO  Daily Treatment Note  NAME: Lata Schrader  : 1975  MRN: [id number]    Date of Service: 10/1/2023    Discharge Recommendations:  24 hour supervision or assist (return to group home setting)         Patient Diagnosis(es): The encounter diagnosis was Closed fracture of shaft of left femur, unspecified fracture morphology, initial encounter ([address]). Assessment    Assessment: Pt tolerated OT/PT cotreat well this date. Co-tx collaboration this date to safely meet goals and will have better occupational performance outcomes with in a co-treatment than 1:1 session. Pt completed rolling in bed with Mod A x 1 for first roll and Max A x 1 for second roll to complete saul care and doff/don brief. Pt tolerated sitting EOB for 5 minutes with Prateek-MaxA assistance required throughout. Pt would continue to benefit from acute OT at this time to improve tolerance of functional activity. Continue d/c recs for 24 hour assist/supervision back to group home. Activity Tolerance: Patient tolerated treatment well;Treatment limited secondary to decreased cognition  Discharge Recommendations: 24 hour supervision or assist (return to group home setting)      Plan   Occupational Therapy Plan  Times Per Week: 3-5x  Current Treatment Recommendations: Functional mobility training;Self-Care / ADL; Balance training;ROM;Strengthening; Endurance training; Safety education & training;Equipment evaluation, education, & procurement;Patient/Caregiver education & training     Restrictions  Restrictions/Precautions  Restrictions/Precautions: Fall Risk;Weight Bearing;Swallowing - Thickened Liquids  Lower Extremity Weight Bearing Restrictions  Left Lower Extremity Weight Bearing: Partial Weight Bearing  Partial Weight Bearing Percentage Or Pounds: 50% PWB on LLE  Position Activity Restriction  Other position/activity restrictions: Avasys, non-verbal, legally blind, purewick,

## 2023-10-01 NOTE — PROGRESS NOTES
Physical Therapy  Facility/Department: [facility] C5 - MED SURG/ORTHO  Daily Treatment Note  NAME: Erica Patten  : 1975  MRN: [id number]    Date of Service: 10/1/2023    Discharge Recommendations: Other (comment) (Group home with assist )   PT Equipment Recommendations  Equipment Needed: No    Patient Diagnosis(es): The encounter diagnosis was Closed fracture of shaft of left femur, unspecified fracture morphology, initial encounter ([address] Assessment   Assessment: Pt seen as cotx with OT in order to safely progress mobility towards goals. Pt tolerates sitting EOB for ~5 min with variable min-max A for balance (R lateral lean noted and potentially actively resisting manual inputs). Pt required mod Ax1 for first rolling attempt to R, and max Ax1 for remaining attempts for saul care and repositioning due to pt's briefs being soiled upon arrival. Pt would continue to benefit from skilled therapy during LOS to progress functional mobility as tolerated. Continue to recommend return back to group home with  caregiver assist at d/c. Activity Tolerance: Patient tolerated treatment well;Treatment limited secondary to decreased cognition  Equipment Needed: No     Plan    Physcial Therapy Plan  General Plan: 5-7 times per week  Current Treatment Recommendations: Strengthening;Transfer training;Functional mobility training;Balance training;Gait training; Wheelchair mobility training; Endurance training;Home exercise program;Patient/Caregiver education & training; Safety education & training;Positioning; Therapeutic activities     Restrictions  Restrictions/Precautions  Restrictions/Precautions: Fall Risk, Weight Bearing, Swallowing - Thickened Liquids  Lower Extremity Weight Bearing Restrictions  Left Lower Extremity Weight Bearing: Partial Weight Bearing  Partial Weight Bearing Percentage Or Pounds: 50% PWB on LLE  Position Activity Restriction  Other position/activity restrictions: Avasys, non-verbal, legally

## 2023-10-02 LAB
ANION GAP SERPL CALCULATED.3IONS-SCNC: 10 MMOL/L (ref 3–16)
BASOPHILS # BLD: 0 K/UL (ref 0–0.2)
BASOPHILS NFR BLD: 0.3 %
BUN SERPL-MCNC: 13 MG/DL (ref 7–20)
CALCIUM SERPL-MCNC: 8.9 MG/DL (ref 8.3–10.6)
CHLORIDE SERPL-SCNC: 104 MMOL/L (ref 99–110)
CO2 SERPL-SCNC: 25 MMOL/L (ref 21–32)
CREAT SERPL-MCNC: 0.8 MG/DL (ref 0.9–1.3)
DEPRECATED RDW RBC AUTO: 14.8 % (ref 12.4–15.4)
EOSINOPHIL # BLD: 0.1 K/UL (ref 0–0.6)
EOSINOPHIL NFR BLD: 1.4 %
GFR SERPLBLD CREATININE-BSD FMLA CKD-EPI: >60 ML/MIN/{1.73_M2}
GLUCOSE BLD-MCNC: 115 MG/DL (ref 70–99)
GLUCOSE BLD-MCNC: 217 MG/DL (ref 70–99)
GLUCOSE BLD-MCNC: 270 MG/DL (ref 70–99)
GLUCOSE BLD-MCNC: 71 MG/DL (ref 70–99)
GLUCOSE SERPL-MCNC: 215 MG/DL (ref 70–99)
HCT VFR BLD AUTO: 26 % (ref 40.5–52.5)
HGB BLD-MCNC: 8.9 G/DL (ref 13.5–17.5)
LYMPHOCYTES # BLD: 1.8 K/UL (ref 1–5.1)
LYMPHOCYTES NFR BLD: 33.7 %
MCH RBC QN AUTO: 30.3 PG (ref 26–34)
MCHC RBC AUTO-ENTMCNC: 34.2 G/DL (ref 31–36)
MCV RBC AUTO: 88.5 FL (ref 80–100)
MONOCYTES # BLD: 0.4 K/UL (ref 0–1.3)
MONOCYTES NFR BLD: 6.8 %
NEUTROPHILS # BLD: 3 K/UL (ref 1.7–7.7)
NEUTROPHILS NFR BLD: 57.8 %
PERFORMED ON: ABNORMAL
PERFORMED ON: NORMAL
PLATELET # BLD AUTO: 169 K/UL (ref 135–450)
PMV BLD AUTO: 7.2 FL (ref 5–10.5)
POTASSIUM SERPL-SCNC: 3.8 MMOL/L (ref 3.5–5.1)
RBC # BLD AUTO: 2.93 M/UL (ref 4.2–5.9)
SODIUM SERPL-SCNC: 139 MMOL/L (ref 136–145)
WBC # BLD AUTO: 5.3 K/UL (ref 4–11)

## 2023-10-02 PROCEDURE — 6370000000 HC RX 637 (ALT 250 FOR IP)

## 2023-10-02 PROCEDURE — 1200000000 HC SEMI PRIVATE

## 2023-10-02 PROCEDURE — 6370000000 HC RX 637 (ALT 250 FOR IP): Performed by: ORTHOPAEDIC SURGERY

## 2023-10-02 PROCEDURE — 6370000000 HC RX 637 (ALT 250 FOR IP): Performed by: NURSE PRACTITIONER

## 2023-10-02 PROCEDURE — 6370000000 HC RX 637 (ALT 250 FOR IP): Performed by: INTERNAL MEDICINE

## 2023-10-02 PROCEDURE — 36415 COLL VENOUS BLD VENIPUNCTURE: CPT

## 2023-10-02 PROCEDURE — 6360000002 HC RX W HCPCS: Performed by: SURGERY

## 2023-10-02 PROCEDURE — 2580000003 HC RX 258: Performed by: ORTHOPAEDIC SURGERY

## 2023-10-02 PROCEDURE — 97535 SELF CARE MNGMENT TRAINING: CPT

## 2023-10-02 PROCEDURE — 97140 MANUAL THERAPY 1/> REGIONS: CPT

## 2023-10-02 PROCEDURE — 6360000002 HC RX W HCPCS: Performed by: ORTHOPAEDIC SURGERY

## 2023-10-02 PROCEDURE — 85025 COMPLETE CBC W/AUTO DIFF WBC: CPT

## 2023-10-02 PROCEDURE — 6360000002 HC RX W HCPCS: Performed by: PSYCHIATRY & NEUROLOGY

## 2023-10-02 PROCEDURE — 80048 BASIC METABOLIC PNL TOTAL CA: CPT

## 2023-10-02 PROCEDURE — C9113 INJ PANTOPRAZOLE SODIUM, VIA: HCPCS | Performed by: SURGERY

## 2023-10-02 PROCEDURE — 2580000003 HC RX 258: Performed by: PSYCHIATRY & NEUROLOGY

## 2023-10-02 PROCEDURE — C9254 INJECTION, LACOSAMIDE: HCPCS | Performed by: PSYCHIATRY & NEUROLOGY

## 2023-10-02 PROCEDURE — 6370000000 HC RX 637 (ALT 250 FOR IP): Performed by: SPECIALIST/TECHNOLOGIST

## 2023-10-02 RX ORDER — OXYCODONE HYDROCHLORIDE 5 MG/1
5 TABLET ORAL EVERY 4 HOURS PRN
Status: DISCONTINUED | OUTPATIENT
Start: 2023-10-02 | End: 2023-10-05 | Stop reason: HOSPADM

## 2023-10-02 RX ADMIN — SODIUM CHLORIDE, PRESERVATIVE FREE 10 ML: 5 INJECTION INTRAVENOUS at 21:46

## 2023-10-02 RX ADMIN — ACETAMINOPHEN 650 MG: 325 TABLET ORAL at 22:13

## 2023-10-02 RX ADMIN — SODIUM CHLORIDE, PRESERVATIVE FREE 10 ML: 5 INJECTION INTRAVENOUS at 08:44

## 2023-10-02 RX ADMIN — CHOLECALCIFEROL (VITAMIN D3) 10 MCG (400 UNIT) TABLET 400 UNITS: at 08:21

## 2023-10-02 RX ADMIN — ACETAMINOPHEN 650 MG: 325 TABLET ORAL at 17:01

## 2023-10-02 RX ADMIN — OXYCODONE HYDROCHLORIDE 5 MG: 5 TABLET ORAL at 17:01

## 2023-10-02 RX ADMIN — GABAPENTIN 300 MG: 300 CAPSULE ORAL at 17:01

## 2023-10-02 RX ADMIN — ENOXAPARIN SODIUM 40 MG: 100 INJECTION SUBCUTANEOUS at 08:21

## 2023-10-02 RX ADMIN — CARBAMAZEPINE 200 MG: 100 TABLET, CHEWABLE ORAL at 08:21

## 2023-10-02 RX ADMIN — INSULIN GLARGINE 14 UNITS: 100 INJECTION, SOLUTION SUBCUTANEOUS at 21:46

## 2023-10-02 RX ADMIN — SIMETHICONE 80 MG: 80 TABLET, CHEWABLE ORAL at 12:35

## 2023-10-02 RX ADMIN — LAMOTRIGINE 450 MG: 100 TABLET ORAL at 21:45

## 2023-10-02 RX ADMIN — LACOSAMIDE 150 MG: 10 INJECTION INTRAVENOUS at 09:53

## 2023-10-02 RX ADMIN — SIMETHICONE 80 MG: 80 TABLET, CHEWABLE ORAL at 08:22

## 2023-10-02 RX ADMIN — CARBAMAZEPINE 200 MG: 100 TABLET, CHEWABLE ORAL at 12:35

## 2023-10-02 RX ADMIN — INSULIN LISPRO 2 UNITS: 100 INJECTION, SOLUTION INTRAVENOUS; SUBCUTANEOUS at 08:28

## 2023-10-02 RX ADMIN — ZONISAMIDE 400 MG: 100 CAPSULE ORAL at 21:47

## 2023-10-02 RX ADMIN — GABAPENTIN 300 MG: 300 CAPSULE ORAL at 21:46

## 2023-10-02 RX ADMIN — ASPIRIN 81 MG: 81 TABLET, CHEWABLE ORAL at 08:23

## 2023-10-02 RX ADMIN — PANTOPRAZOLE SODIUM 40 MG: 40 INJECTION, POWDER, FOR SOLUTION INTRAVENOUS at 08:21

## 2023-10-02 RX ADMIN — INSULIN LISPRO 4 UNITS: 100 INJECTION, SOLUTION INTRAVENOUS; SUBCUTANEOUS at 17:05

## 2023-10-02 RX ADMIN — GABAPENTIN 300 MG: 300 CAPSULE ORAL at 12:35

## 2023-10-02 RX ADMIN — CARBAMAZEPINE 200 MG: 100 TABLET, CHEWABLE ORAL at 21:46

## 2023-10-02 RX ADMIN — CARBAMAZEPINE 200 MG: 100 TABLET, CHEWABLE ORAL at 17:01

## 2023-10-02 RX ADMIN — SERTRALINE 50 MG: 50 TABLET, FILM COATED ORAL at 21:46

## 2023-10-02 RX ADMIN — GABAPENTIN 300 MG: 300 CAPSULE ORAL at 08:23

## 2023-10-02 RX ADMIN — INSULIN LISPRO 4 UNITS: 100 INJECTION, SOLUTION INTRAVENOUS; SUBCUTANEOUS at 08:28

## 2023-10-02 RX ADMIN — OXYCODONE HYDROCHLORIDE 5 MG: 5 TABLET ORAL at 22:40

## 2023-10-02 RX ADMIN — LEVOTHYROXINE SODIUM 75 MCG: 0.03 TABLET ORAL at 08:21

## 2023-10-02 RX ADMIN — INSULIN LISPRO 4 UNITS: 100 INJECTION, SOLUTION INTRAVENOUS; SUBCUTANEOUS at 17:02

## 2023-10-02 RX ADMIN — SIMETHICONE 80 MG: 80 TABLET, CHEWABLE ORAL at 21:45

## 2023-10-02 RX ADMIN — LAMOTRIGINE 400 MG: 100 TABLET ORAL at 08:22

## 2023-10-02 RX ADMIN — DOCUSATE SODIUM 100 MG: 100 CAPSULE, LIQUID FILLED ORAL at 08:22

## 2023-10-02 ASSESSMENT — PAIN DESCRIPTION - ORIENTATION: ORIENTATION: RIGHT

## 2023-10-02 ASSESSMENT — PAIN SCALES - GENERAL
PAINLEVEL_OUTOF10: 8
PAINLEVEL_OUTOF10: 0
PAINLEVEL_OUTOF10: 0

## 2023-10-02 ASSESSMENT — PAIN SCALES - WONG BAKER
WONGBAKER_NUMERICALRESPONSE: 0
WONGBAKER_NUMERICALRESPONSE: 0

## 2023-10-02 ASSESSMENT — PAIN DESCRIPTION - LOCATION: LOCATION: HIP

## 2023-10-02 ASSESSMENT — PAIN DESCRIPTION - DESCRIPTORS: DESCRIPTORS: ACHING

## 2023-10-02 NOTE — PROGRESS NOTES
Occupational Therapy  Facility/Department: [facility] C5 - MED SURG/ORTHO  Daily Treatment Note  NAME: Kia Spencer  : 1975  MRN: [id number]    Date of Service: 10/2/2023    Discharge Recommendations:  24 hour supervision or assist (return to group home setting)         Patient Diagnosis(es): The encounter diagnosis was Closed fracture of shaft of left femur, unspecified fracture morphology, initial encounter ([address]). Assessment    Assessment: Pt tolerated therapy session fair this date. Co-tx collaboration this date to safely meet goals and will have better occupational performance outcomes with in a co-treatment than 1:1 session. Pt tolerated rolling L+R this date to change brief with Max A + Mod A x2 to roll. Pt seems more stiff this date compared to last, unsure if increased muscle tone or pt resistant to PROM. Pt would continue to benefit from acute OT at this time to improve functional mobility and ADL independence. D/c recs for Home to group home when medically ready. Activity Tolerance: Patient tolerated treatment well;Treatment limited secondary to decreased cognition  Discharge Recommendations: 24 hour supervision or assist (return to group home setting)      Plan   Occupational Therapy Plan  Times Per Week: 3-5x  Current Treatment Recommendations: Functional mobility training;Self-Care / ADL; Balance training;ROM;Strengthening; Endurance training; Safety education & training;Equipment evaluation, education, & procurement;Patient/Caregiver education & training     Restrictions  Restrictions/Precautions  Restrictions/Precautions: Fall Risk;Weight Bearing;Swallowing - Thickened Liquids  Lower Extremity Weight Bearing Restrictions  Left Lower Extremity Weight Bearing: Partial Weight Bearing  Partial Weight Bearing Percentage Or Pounds: 50% PWB on LLE  Position Activity Restriction  Other position/activity restrictions: Avasys, non-verbal, legally blind, purewick, tele    Subjective help is needed for bathing (which includes washing, rinsing, drying)?: Total  How much help is needed for toileting (which includes using toilet, bedpan, or urinal)?: Total  How much help is needed for putting on and taking off regular upper body clothing?: Total  How much help is needed for taking care of personal grooming?: Total  How much help for eating meals?: Total  AM-PAC Inpatient Daily Activity Raw Score: 6  AM-PAC Inpatient ADL T-Scale Score : 17.07  ADL Inpatient CMS 0-100% Score: 100  ADL Inpatient CMS G-Code Modifier : CN      Goals  Short Term Goals  Time Frame for Short Term Goals: 1 week (10/6) unless noted--GOALS ONGOING 10/2  Short Term Goal 1: Sit EOB x3 min with mod A for sitting balance by 10/4  Short Term Goal 2: Roll side to side in bed with max x1 for ADL tasks and pressure relief  Short Term Goal 3: Participate in UE AAROM 10-15x each  Patient Goals   Patient goals : Pt did not provide       Therapy Time   Individual Concurrent Group Co-treatment   Time In       1040   Time Out       1056   Minutes       16   Timed Code Treatment Minutes: [address], OT

## 2023-10-02 NOTE — CARE COORDINATION
Writer reviewed chart and spoke with MD, and RN, patient needs to intake more PO intake. Plan is to return to the respite house with his care taker. Likely will be ready 10/3. DCP will continue to follow.      Galen Murillo RN

## 2023-10-02 NOTE — PROGRESS NOTES
Physical Therapy  Facility/Department: [facility] C5 - MED SURG/ORTHO  Daily Treatment Note  NAME: Margarito Barraza  : 1975  MRN: [id number]    Date of Service: 10/2/2023    Discharge Recommendations: Other (comment) (group home with 24/7 assist)   PT Equipment Recommendations  Equipment Needed: No    Patient Diagnosis(es): The encounter diagnosis was Closed fracture of shaft of left femur, unspecified fracture morphology, initial encounter ([address] Assessment   Assessment: [facility] for closed L subtrochanteric femur fx. Pt is legally blind and non-verbal. Pt presents below baseline for PT treatment with decreased mobility. Pt presents with resistance with PROM and favoring of R side with positioning. Pt Max + Mod A x2 for rolling for brief change/pericare. Pt would continue to benefit from skilled PT to aid in the above deficits and a safe DC back to group home with 24/7 assist when medically appropriate. Pt seen as co-treat with OT due to complexity and level of assist in order to promote safe environment with transfers and increase overall effectiveness of treatment. Activity Tolerance: Patient tolerated treatment well;Treatment limited secondary to decreased cognition  Equipment Needed: No       Plan    Physcial Therapy Plan  General Plan: 5-7 times per week  Current Treatment Recommendations: Strengthening;Transfer training;Functional mobility training;Balance training;Gait training; Wheelchair mobility training; Endurance training;Home exercise program;Patient/Caregiver education & training; Safety education & training;Positioning; Therapeutic activities     Restrictions  Restrictions/Precautions  Restrictions/Precautions: Fall Risk, Weight Bearing, Swallowing - Thickened Liquids  Lower Extremity Weight Bearing Restrictions  Left Lower Extremity Weight Bearing: Partial Weight Bearing  Partial Weight Bearing Percentage Or Pounds: 50% PWB on LLE  Position Activity Restriction  Other position/activity goals    Education  Patient Education  Education Given To: Patient  Education Provided: Role of Therapy;Plan of Care  Education Provided Comments: Educated on role of PT, positioning techniques  Education Method: Verbal;Demonstration  Barriers to Learning: Cognition; Hearing;Vision  Education Outcome: Unable to verbalize; Unable to demonstrate understanding;Continued education needed    [facility] 6 Clicks Inpatient Mobility:  AM-PAC Basic Mobility - Inpatient   How much help is needed turning from your back to your side while in a flat bed without using bedrails?: A Lot  How much help is needed moving from lying on your back to sitting on the side of a flat bed without using bedrails?: Total  How much help is needed moving to and from a bed to a chair?: Total  How much help is needed standing up from a chair using your arms?: Total  How much help is needed walking in hospital room?: Total  How much help is needed climbing 3-5 steps with a railing?: Total  AM-PAC Inpatient Mobility Raw Score : 7  AM-PAC Inpatient T-Scale Score : 26.42  Mobility Inpatient CMS 0-100% Score: 92.36  Mobility Inpatient CMS G-Code Modifier : CM    Therapy Time   Individual Concurrent Group Co-treatment   Time In       1041   Time Out       1057   Minutes       16   Timed Code Treatment Minutes: 16 Minutes       Birdie Parrish PT

## 2023-10-03 LAB
ANION GAP SERPL CALCULATED.3IONS-SCNC: 8 MMOL/L (ref 3–16)
BASOPHILS # BLD: 0 K/UL (ref 0–0.2)
BASOPHILS NFR BLD: 0.3 %
BUN SERPL-MCNC: 15 MG/DL (ref 7–20)
CALCIUM SERPL-MCNC: 8.7 MG/DL (ref 8.3–10.6)
CHLORIDE SERPL-SCNC: 106 MMOL/L (ref 99–110)
CO2 SERPL-SCNC: 25 MMOL/L (ref 21–32)
CREAT SERPL-MCNC: 0.8 MG/DL (ref 0.9–1.3)
DEPRECATED RDW RBC AUTO: 14.8 % (ref 12.4–15.4)
EOSINOPHIL # BLD: 0.1 K/UL (ref 0–0.6)
EOSINOPHIL NFR BLD: 1.4 %
GFR SERPLBLD CREATININE-BSD FMLA CKD-EPI: >60 ML/MIN/{1.73_M2}
GLUCOSE BLD-MCNC: 135 MG/DL (ref 70–99)
GLUCOSE BLD-MCNC: 136 MG/DL (ref 70–99)
GLUCOSE BLD-MCNC: 149 MG/DL (ref 70–99)
GLUCOSE BLD-MCNC: 214 MG/DL (ref 70–99)
GLUCOSE BLD-MCNC: 232 MG/DL (ref 70–99)
GLUCOSE SERPL-MCNC: 223 MG/DL (ref 70–99)
HCT VFR BLD AUTO: 25.4 % (ref 40.5–52.5)
HGB BLD-MCNC: 8.4 G/DL (ref 13.5–17.5)
LYMPHOCYTES # BLD: 1.7 K/UL (ref 1–5.1)
LYMPHOCYTES NFR BLD: 28.6 %
MCH RBC QN AUTO: 29.6 PG (ref 26–34)
MCHC RBC AUTO-ENTMCNC: 33 G/DL (ref 31–36)
MCV RBC AUTO: 89.5 FL (ref 80–100)
MONOCYTES # BLD: 0.3 K/UL (ref 0–1.3)
MONOCYTES NFR BLD: 5.3 %
NEUTROPHILS # BLD: 3.8 K/UL (ref 1.7–7.7)
NEUTROPHILS NFR BLD: 64.4 %
PERFORMED ON: ABNORMAL
PLATELET # BLD AUTO: 210 K/UL (ref 135–450)
PMV BLD AUTO: 7.1 FL (ref 5–10.5)
POTASSIUM SERPL-SCNC: 3.9 MMOL/L (ref 3.5–5.1)
RBC # BLD AUTO: 2.84 M/UL (ref 4.2–5.9)
SODIUM SERPL-SCNC: 139 MMOL/L (ref 136–145)
WBC # BLD AUTO: 5.9 K/UL (ref 4–11)

## 2023-10-03 PROCEDURE — 80048 BASIC METABOLIC PNL TOTAL CA: CPT

## 2023-10-03 PROCEDURE — 6360000002 HC RX W HCPCS: Performed by: ORTHOPAEDIC SURGERY

## 2023-10-03 PROCEDURE — 6370000000 HC RX 637 (ALT 250 FOR IP): Performed by: NURSE PRACTITIONER

## 2023-10-03 PROCEDURE — 85025 COMPLETE CBC W/AUTO DIFF WBC: CPT

## 2023-10-03 PROCEDURE — 6360000002 HC RX W HCPCS: Performed by: SURGERY

## 2023-10-03 PROCEDURE — 6370000000 HC RX 637 (ALT 250 FOR IP): Performed by: ORTHOPAEDIC SURGERY

## 2023-10-03 PROCEDURE — 6370000000 HC RX 637 (ALT 250 FOR IP)

## 2023-10-03 PROCEDURE — 6370000000 HC RX 637 (ALT 250 FOR IP): Performed by: SPECIALIST/TECHNOLOGIST

## 2023-10-03 PROCEDURE — 97110 THERAPEUTIC EXERCISES: CPT

## 2023-10-03 PROCEDURE — 6370000000 HC RX 637 (ALT 250 FOR IP): Performed by: INTERNAL MEDICINE

## 2023-10-03 PROCEDURE — 1200000000 HC SEMI PRIVATE

## 2023-10-03 PROCEDURE — C9113 INJ PANTOPRAZOLE SODIUM, VIA: HCPCS | Performed by: SURGERY

## 2023-10-03 PROCEDURE — 36415 COLL VENOUS BLD VENIPUNCTURE: CPT

## 2023-10-03 PROCEDURE — 2580000003 HC RX 258: Performed by: ORTHOPAEDIC SURGERY

## 2023-10-03 RX ADMIN — GABAPENTIN 300 MG: 300 CAPSULE ORAL at 14:09

## 2023-10-03 RX ADMIN — INSULIN GLARGINE 14 UNITS: 100 INJECTION, SOLUTION SUBCUTANEOUS at 21:21

## 2023-10-03 RX ADMIN — CARBAMAZEPINE 200 MG: 100 TABLET, CHEWABLE ORAL at 14:09

## 2023-10-03 RX ADMIN — GABAPENTIN 300 MG: 300 CAPSULE ORAL at 08:37

## 2023-10-03 RX ADMIN — CHOLECALCIFEROL (VITAMIN D3) 10 MCG (400 UNIT) TABLET 400 UNITS: at 08:37

## 2023-10-03 RX ADMIN — ACETAMINOPHEN 650 MG: 325 TABLET ORAL at 05:18

## 2023-10-03 RX ADMIN — OXYCODONE HYDROCHLORIDE 5 MG: 5 TABLET ORAL at 05:18

## 2023-10-03 RX ADMIN — ZONISAMIDE 400 MG: 100 CAPSULE ORAL at 21:09

## 2023-10-03 RX ADMIN — ACETAMINOPHEN 650 MG: 325 TABLET ORAL at 17:08

## 2023-10-03 RX ADMIN — GABAPENTIN 300 MG: 300 CAPSULE ORAL at 17:08

## 2023-10-03 RX ADMIN — SIMETHICONE 80 MG: 80 TABLET, CHEWABLE ORAL at 10:21

## 2023-10-03 RX ADMIN — SIMETHICONE 80 MG: 80 TABLET, CHEWABLE ORAL at 21:10

## 2023-10-03 RX ADMIN — ACETAMINOPHEN 650 MG: 325 TABLET ORAL at 10:24

## 2023-10-03 RX ADMIN — DOCUSATE SODIUM 100 MG: 100 CAPSULE, LIQUID FILLED ORAL at 08:37

## 2023-10-03 RX ADMIN — ASPIRIN 81 MG: 81 TABLET, CHEWABLE ORAL at 08:37

## 2023-10-03 RX ADMIN — DOCUSATE SODIUM 100 MG: 100 CAPSULE, LIQUID FILLED ORAL at 21:09

## 2023-10-03 RX ADMIN — INSULIN LISPRO 4 UNITS: 100 INJECTION, SOLUTION INTRAVENOUS; SUBCUTANEOUS at 09:12

## 2023-10-03 RX ADMIN — PANTOPRAZOLE SODIUM 40 MG: 40 INJECTION, POWDER, FOR SOLUTION INTRAVENOUS at 08:38

## 2023-10-03 RX ADMIN — LEVOTHYROXINE SODIUM 75 MCG: 0.03 TABLET ORAL at 05:18

## 2023-10-03 RX ADMIN — LAMOTRIGINE 400 MG: 100 TABLET ORAL at 08:37

## 2023-10-03 RX ADMIN — LAMOTRIGINE 450 MG: 100 TABLET ORAL at 21:09

## 2023-10-03 RX ADMIN — SODIUM CHLORIDE, PRESERVATIVE FREE 10 ML: 5 INJECTION INTRAVENOUS at 23:25

## 2023-10-03 RX ADMIN — GABAPENTIN 300 MG: 300 CAPSULE ORAL at 21:10

## 2023-10-03 RX ADMIN — CARBAMAZEPINE 200 MG: 100 TABLET, CHEWABLE ORAL at 17:08

## 2023-10-03 RX ADMIN — ENOXAPARIN SODIUM 40 MG: 100 INJECTION SUBCUTANEOUS at 09:33

## 2023-10-03 RX ADMIN — CARBAMAZEPINE 200 MG: 100 TABLET, CHEWABLE ORAL at 10:21

## 2023-10-03 RX ADMIN — SERTRALINE 50 MG: 50 TABLET, FILM COATED ORAL at 21:10

## 2023-10-03 RX ADMIN — INSULIN LISPRO 2 UNITS: 100 INJECTION, SOLUTION INTRAVENOUS; SUBCUTANEOUS at 09:12

## 2023-10-03 RX ADMIN — SODIUM CHLORIDE, PRESERVATIVE FREE 10 ML: 5 INJECTION INTRAVENOUS at 09:32

## 2023-10-03 RX ADMIN — CARBAMAZEPINE 200 MG: 100 TABLET, CHEWABLE ORAL at 21:10

## 2023-10-03 ASSESSMENT — PAIN DESCRIPTION - LOCATION
LOCATION: HIP
LOCATION: HIP

## 2023-10-03 ASSESSMENT — PAIN SCALES - WONG BAKER: WONGBAKER_NUMERICALRESPONSE: 0

## 2023-10-03 ASSESSMENT — PAIN DESCRIPTION - PAIN TYPE
TYPE: CHRONIC PAIN
TYPE: CHRONIC PAIN

## 2023-10-03 ASSESSMENT — PAIN DESCRIPTION - ORIENTATION
ORIENTATION: LEFT
ORIENTATION: LEFT

## 2023-10-03 ASSESSMENT — PAIN - FUNCTIONAL ASSESSMENT
PAIN_FUNCTIONAL_ASSESSMENT: PREVENTS OR INTERFERES SOME ACTIVE ACTIVITIES AND ADLS
PAIN_FUNCTIONAL_ASSESSMENT: PREVENTS OR INTERFERES SOME ACTIVE ACTIVITIES AND ADLS

## 2023-10-03 ASSESSMENT — PAIN DESCRIPTION - FREQUENCY
FREQUENCY: CONTINUOUS
FREQUENCY: CONTINUOUS

## 2023-10-03 ASSESSMENT — PAIN SCALES - GENERAL
PAINLEVEL_OUTOF10: 4
PAINLEVEL_OUTOF10: 3
PAINLEVEL_OUTOF10: 4

## 2023-10-03 ASSESSMENT — PAIN DESCRIPTION - DESCRIPTORS
DESCRIPTORS: ACHING
DESCRIPTORS: ACHING

## 2023-10-03 NOTE — CARE COORDINATION
[facility]    Referral received from  to follow for home care services. I will follow for needs, and speak with patient to verify demos.     Need legal guardian present at soc to sign consents    Called LVM awaiting callback:     Contact Information   [phone] [facility] Phone)    [phone] (Mobile) disconnected    Alternate    +3 more     Andressa Sharma (Legal Guardian)   [phone] (Home Phone)       10/3 1224pm Snf planned; home care referral cancel    David Martinez RN, BSN [phone]  [facility]   [phone] fax [phone]  [facility] [employer] [address] [phone]

## 2023-10-03 NOTE — PROGRESS NOTES
Report received from A1 nurse VSS. RN was notified pt had not urinated much today, upon arrival to C5 pt urinated.

## 2023-10-03 NOTE — CARE COORDINATION
Received call from Liliana Deras, the  to apply for Waiver of Capacity per [address] to accept patient into [facility].   They have started today, should have decision tomorrow AM.  Electronically signed by STEPHANIE Sutton on 10/3/2023 at 4:13 PM
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
## 2023-10-03 NOTE — PROGRESS NOTES
Occupational Therapy  Facility/Department: [place] [address] [facility]  Daily Treatment Note  NAME: Sari Primrose  : 1975  MRN: [id number]    Date of Service: 10/3/2023    Discharge Recommendations:  Subacute/Skilled Nursing Facility     Therapy discharge recommendations take into account each patient's current medical complexities and are subject to input/oversight from the patient's healthcare team.     Barriers to Home Discharge:   [] Steps to access home entry or bed/bath:   [x] Unable to transfer, ambulate, or propel wheelchair household distances without assist   [x] Limited available assist at home upon discharge    [] Patient or family requests d/c to post-acute facility    [x] Poor cognition/safety awareness for d/c to home alone    [x] Unable to maintain ordered weight bearing status    [x] Patient with salient signs of long-standing immobility   [x] Decreased independence with ADLs   [x] Increased risk for falls   [] Other:    If pt is unable to be seen after this session, please let this note serve as discharge summary. Please see case management note for discharge disposition. Thank you.     AM-PAC Daily Activity - Inpatient   How much help is needed for putting on and taking off regular lower body clothing?: Total  How much help is needed for bathing (which includes washing, rinsing, drying)?: Total  How much help is needed for toileting (which includes using toilet, bedpan, or urinal)?: Total  How much help is needed for putting on and taking off regular upper body clothing?: Total  How much help is needed for taking care of personal grooming?: Total  How much help for eating meals?: Total  AM-PAC Inpatient Daily Activity Raw Score: 6  AM-PAC Inpatient ADL T-Scale Score : 17.07  ADL Inpatient CMS 0-100% Score: 100  ADL Inpatient CMS G-Code Modifier : CN      Patient Diagnosis(es): The encounter diagnosis was Closed fracture of shaft of left femur, unspecified fracture morphology, initial improved alignment)     ADL  Additional Comments: ADLs not appropriate this date secondary to decreased arousal, pt likely requiring totalA for all ADLs  OT Exercises  Exercise Treatment: x10 reps of PROM of LUE (shoulder flexion, elbow flexion/extension, wrist flexion/extension, finger flexion/extension, thumb opposition), pt tolerated well. Unable to perform RUE PROM secondary to pt lying on R side and resisting roll to back. Safety Devices  Type of Devices: All marco antonio prominences offloaded;Nurse notified; Bed alarm in place;Call light within reach; Left in bed;Sitter present; All fall risk precautions in place  Restraints  Restraints Initially in Place: No     Patient Education  Education Given To: Patient  Education Provided Comments: Pt unable to participate in education d/t baseline deficits. Barriers to Learning: Hearing;Vision;Cognition  Education Outcome: Unable to verbalize; Unable to demonstrate understanding    Goals  Short Term Goals  Time Frame for Short Term Goals: 1 week (10/6) unless noted--GOALS ONGOING 10/3  Short Term Goal 1: Sit EOB x3 min with mod A for sitting balance by 10/4  Short Term Goal 2: Roll side to side in bed with max x1 for ADL tasks and pressure relief  Short Term Goal 3: Participate in UE AAROM 10-15x each  Patient Goals   Patient goals : Pt did not provide       Therapy Time   Individual Concurrent Group Co-treatment   Time In       1325   Time Out       1337   Minutes       12   Timed Code Treatment Minutes: 12 Minutes       Genaro Osgood, OT

## 2023-10-03 NOTE — PLAN OF CARE
Problem: Discharge Planning  Goal: Discharge to home or other facility with appropriate resources  Outcome: Progressing     Problem: Skin/Tissue Integrity  Goal: Absence of new skin breakdown  Description: 1. Monitor for areas of redness and/or skin breakdown  2. Assess vascular access sites hourly  3. Every 4-6 hours minimum:  Change oxygen saturation probe site  4. Every 4-6 hours:  If on nasal continuous positive airway pressure, respiratory therapy assess nares and determine need for appliance change or resting period. Outcome: Progressing     Problem: Safety - Adult  Goal: Free from fall injury  Outcome: Progressing     Problem: ABCDS Injury Assessment  Goal: Absence of physical injury  Outcome: Progressing     Problem: Chronic Conditions and Co-morbidities  Goal: Patient's chronic conditions and co-morbidity symptoms are monitored and maintained or improved  Outcome: Progressing  Note: Pt will have accuchecks before meals and at bedtime with scheduled and sliding scale insulin in place for coverage. Will continue to monitor for signs and symptoms of hypoglycemia and hyperglycemia throughout shift.       Problem: Pain  Goal: Verbalizes/displays adequate comfort level or baseline comfort level  Outcome: Progressing

## 2023-10-03 NOTE — PROGRESS NOTES
[place]  Neurology Follow-up  [facility] Neurology    Date of Service: 10/3/2023    Subjective:   CC: Follow up today regarding: Unable to take PO seizure medications    Events noted. Chart and lab reviewed. Was awake and alert this AM per nursing. Ate almost 100% of breakfast.  No family present. ROS : A 10-12 system review obtained from discussion with patient's family/and or nurse, was unremarkable. family history is not on file.     Past Medical History:   Diagnosis Date    Acne     Blindness, legal     Constipation     Diabetes mellitus ([address])     Epilepsy ([address])     GERD (gastroesophageal reflux disease)     Hearing loss     Hypokalemia     Hypothyroidism     Impulse control disorder     Mental retardation, profound (I.Q. < 20)     Microcephaly (HCC)     MRSA (methicillin resistant staph aureus) culture positive 01/15/2016    hand    Non-verbal learning disorder     PAD (peripheral artery disease) (HCC)     Pneumonia     Scoliosis     Thyroid disease      Current Facility-Administered Medications   Medication Dose Route Frequency Provider Last Rate Last Admin    oxyCODONE (ROXICODONE) immediate release tablet 5 mg  5 mg Oral Q4H PRN Azael Loving Aicha-Beckyhed, DO   5 mg at 10/03/23 0518    insulin glargine (LANTUS) injection vial 14 Units  14 Units SubCUTAneous Nightly Abeba Quinonez MD   14 Units at 10/02/23 2146    enoxaparin (LOVENOX) injection 40 mg  40 mg SubCUTAneous Daily Nusrat Hill MD   40 mg at 10/03/23 0933    0.9 % sodium chloride infusion   IntraVENous PRN Nusrat Hill MD 25 mL/hr at 10/01/23 1012 25 mL/hr at 10/01/23 1012    ondansetron (ZOFRAN-ODT) disintegrating tablet 4 mg  4 mg Oral Q8H PRN Nusrat Hill MD        Or    ondansetron [facility]) injection 4 mg  4 mg IntraVENous Q6H PRN Nusrat Hill MD        carBAMazepine (TEGRETOL) chewable tablet 200 mg  200 mg Oral 4x Daily ELIZABET Mari - CNP   200 mg at 10/03/23 1409    pantoprazole (PROTONIX) injection 40

## 2023-10-03 NOTE — PROGRESS NOTES
Physical Therapy  Facility/Department: [place] [address] [facility] TE[facility]  Daily Treatment Note  NAME: Cathren Closs  : 1975  MRN: [id number]    Date of Service: 10/3/2023    Discharge Recommendations:  Subacute/Skilled Nursing Facility   PT Equipment Recommendations  Equipment Needed: No    Therapy discharge recommendations take into account each patient's current medical complexities and are subject to input/oversight from the patient's healthcare team.     Barriers to Home Discharge:   [] Steps to access home entry or bed/bath:   [x] Unable to transfer, ambulate, or propel wheelchair household distances without assist   [x] Limited available assist at home upon discharge    [x] Patient or family requests d/c to post-acute facility    [x] Poor cognition/safety awareness for d/c to home alone    [x] Unable to maintain ordered weight bearing status    [] Patient with salient signs of long-standing immobility   [] Decreased independence with ADLs   [x] Increased risk for falls   [] Other:    If pt is unable to be seen after this session, please let this note serve as discharge summary. Please see case management note for discharge disposition. Thank you. Patient Diagnosis(es): The encounter diagnosis was Closed fracture of shaft of left femur, unspecified fracture morphology, initial encounter ([address]). Assessment   Assessment: Pt seen as cotx with OT to progress functional mobility safely and maximize outcomes due to complex case. Of note, pt is legally blind and non-verbal. Pt continues to function below baseline, minimally responsive to therapy session. PT performed B PROM and positioning for proper alignment. Pt would benefit from continued skilled PT to address deficits and maximize outcomes.  Recommend SNF upon d/c  Activity Tolerance: Patient tolerated treatment well;Treatment limited secondary to decreased cognition  Equipment Needed: No     Plan    Physcial Therapy Plan  General Plan: 5-7 times per

## 2023-10-03 NOTE — PROGRESS NOTES
Report given to C5 for transfer. 26 let c5 RN know about low urine output and she said she will bladder scan on arrival to floor.

## 2023-10-03 NOTE — CARE COORDINATION
[facility]   After review, this patient is felt to be:       []  Appropriate for Acute Inpatient Rehab    []  Appropriate for Acute Inpatient Rehab Pending Insurance Authorization    [x]  Not appropriate for Acute Inpatient Rehab    []  Referral received and ARU reviewing patient; Evaluation ongoing. Thank you for the referral.    Thank you.    Jacek Rubio, [address] [facility]

## 2023-10-04 LAB
ANION GAP SERPL CALCULATED.3IONS-SCNC: 10 MMOL/L (ref 3–16)
BASOPHILS # BLD: 0 K/UL (ref 0–0.2)
BASOPHILS NFR BLD: 0.5 %
BUN SERPL-MCNC: 14 MG/DL (ref 7–20)
CALCIUM SERPL-MCNC: 8.7 MG/DL (ref 8.3–10.6)
CHLORIDE SERPL-SCNC: 104 MMOL/L (ref 99–110)
CO2 SERPL-SCNC: 25 MMOL/L (ref 21–32)
CREAT SERPL-MCNC: 0.9 MG/DL (ref 0.9–1.3)
DEPRECATED RDW RBC AUTO: 14.8 % (ref 12.4–15.4)
EOSINOPHIL # BLD: 0.2 K/UL (ref 0–0.6)
EOSINOPHIL NFR BLD: 3.2 %
GFR SERPLBLD CREATININE-BSD FMLA CKD-EPI: >60 ML/MIN/{1.73_M2}
GLUCOSE BLD-MCNC: 155 MG/DL (ref 70–99)
GLUCOSE BLD-MCNC: 178 MG/DL (ref 70–99)
GLUCOSE BLD-MCNC: 182 MG/DL (ref 70–99)
GLUCOSE BLD-MCNC: 224 MG/DL (ref 70–99)
GLUCOSE BLD-MCNC: 225 MG/DL (ref 70–99)
GLUCOSE BLD-MCNC: 57 MG/DL (ref 70–99)
GLUCOSE BLD-MCNC: 65 MG/DL (ref 70–99)
GLUCOSE BLD-MCNC: 84 MG/DL (ref 70–99)
GLUCOSE SERPL-MCNC: 243 MG/DL (ref 70–99)
HCT VFR BLD AUTO: 25.2 % (ref 40.5–52.5)
HGB BLD-MCNC: 8.5 G/DL (ref 13.5–17.5)
LYMPHOCYTES # BLD: 1.9 K/UL (ref 1–5.1)
LYMPHOCYTES NFR BLD: 37.5 %
MCH RBC QN AUTO: 30.1 PG (ref 26–34)
MCHC RBC AUTO-ENTMCNC: 33.5 G/DL (ref 31–36)
MCV RBC AUTO: 89.8 FL (ref 80–100)
MONOCYTES # BLD: 0.3 K/UL (ref 0–1.3)
MONOCYTES NFR BLD: 6.6 %
NEUTROPHILS # BLD: 2.7 K/UL (ref 1.7–7.7)
NEUTROPHILS NFR BLD: 52.2 %
PERFORMED ON: ABNORMAL
PERFORMED ON: NORMAL
PLATELET # BLD AUTO: 250 K/UL (ref 135–450)
PMV BLD AUTO: 6.6 FL (ref 5–10.5)
POTASSIUM SERPL-SCNC: 3.6 MMOL/L (ref 3.5–5.1)
RBC # BLD AUTO: 2.81 M/UL (ref 4.2–5.9)
SODIUM SERPL-SCNC: 139 MMOL/L (ref 136–145)
WBC # BLD AUTO: 5.1 K/UL (ref 4–11)

## 2023-10-04 PROCEDURE — 2580000003 HC RX 258: Performed by: ORTHOPAEDIC SURGERY

## 2023-10-04 PROCEDURE — 6360000002 HC RX W HCPCS: Performed by: ORTHOPAEDIC SURGERY

## 2023-10-04 PROCEDURE — 85025 COMPLETE CBC W/AUTO DIFF WBC: CPT

## 2023-10-04 PROCEDURE — 6370000000 HC RX 637 (ALT 250 FOR IP): Performed by: SPECIALIST/TECHNOLOGIST

## 2023-10-04 PROCEDURE — 97110 THERAPEUTIC EXERCISES: CPT

## 2023-10-04 PROCEDURE — 6360000002 HC RX W HCPCS: Performed by: SURGERY

## 2023-10-04 PROCEDURE — 36415 COLL VENOUS BLD VENIPUNCTURE: CPT

## 2023-10-04 PROCEDURE — 1200000000 HC SEMI PRIVATE

## 2023-10-04 PROCEDURE — 6370000000 HC RX 637 (ALT 250 FOR IP)

## 2023-10-04 PROCEDURE — C9113 INJ PANTOPRAZOLE SODIUM, VIA: HCPCS | Performed by: SURGERY

## 2023-10-04 PROCEDURE — 6370000000 HC RX 637 (ALT 250 FOR IP): Performed by: ORTHOPAEDIC SURGERY

## 2023-10-04 PROCEDURE — 6370000000 HC RX 637 (ALT 250 FOR IP): Performed by: NURSE PRACTITIONER

## 2023-10-04 PROCEDURE — 6370000000 HC RX 637 (ALT 250 FOR IP): Performed by: INTERNAL MEDICINE

## 2023-10-04 PROCEDURE — 80048 BASIC METABOLIC PNL TOTAL CA: CPT

## 2023-10-04 RX ORDER — LORAZEPAM 2 MG/ML
1 INJECTION INTRAMUSCULAR EVERY 6 HOURS PRN
Status: DISCONTINUED | OUTPATIENT
Start: 2023-10-04 | End: 2023-10-05 | Stop reason: HOSPADM

## 2023-10-04 RX ADMIN — INSULIN LISPRO 2 UNITS: 100 INJECTION, SOLUTION INTRAVENOUS; SUBCUTANEOUS at 08:58

## 2023-10-04 RX ADMIN — GABAPENTIN 300 MG: 300 CAPSULE ORAL at 08:56

## 2023-10-04 RX ADMIN — BISACODYL 10 MG: 10 SUPPOSITORY RECTAL at 14:46

## 2023-10-04 RX ADMIN — DOCUSATE SODIUM 100 MG: 100 CAPSULE, LIQUID FILLED ORAL at 20:43

## 2023-10-04 RX ADMIN — CARBAMAZEPINE 200 MG: 100 TABLET, CHEWABLE ORAL at 20:43

## 2023-10-04 RX ADMIN — CARBAMAZEPINE 200 MG: 100 TABLET, CHEWABLE ORAL at 13:14

## 2023-10-04 RX ADMIN — ZONISAMIDE 400 MG: 100 CAPSULE ORAL at 20:46

## 2023-10-04 RX ADMIN — GABAPENTIN 300 MG: 300 CAPSULE ORAL at 17:24

## 2023-10-04 RX ADMIN — ACETAMINOPHEN 650 MG: 325 TABLET ORAL at 05:56

## 2023-10-04 RX ADMIN — SIMETHICONE 80 MG: 80 TABLET, CHEWABLE ORAL at 20:43

## 2023-10-04 RX ADMIN — CHOLECALCIFEROL (VITAMIN D3) 10 MCG (400 UNIT) TABLET 400 UNITS: at 08:56

## 2023-10-04 RX ADMIN — ACETAMINOPHEN 650 MG: 325 TABLET ORAL at 11:52

## 2023-10-04 RX ADMIN — SERTRALINE 50 MG: 50 TABLET, FILM COATED ORAL at 20:43

## 2023-10-04 RX ADMIN — ENOXAPARIN SODIUM 40 MG: 100 INJECTION SUBCUTANEOUS at 08:55

## 2023-10-04 RX ADMIN — GABAPENTIN 300 MG: 300 CAPSULE ORAL at 13:14

## 2023-10-04 RX ADMIN — OXYCODONE HYDROCHLORIDE 5 MG: 5 TABLET ORAL at 00:04

## 2023-10-04 RX ADMIN — GABAPENTIN 300 MG: 300 CAPSULE ORAL at 20:43

## 2023-10-04 RX ADMIN — SIMETHICONE 80 MG: 80 TABLET, CHEWABLE ORAL at 08:56

## 2023-10-04 RX ADMIN — OXYCODONE HYDROCHLORIDE 5 MG: 5 TABLET ORAL at 05:56

## 2023-10-04 RX ADMIN — LAMOTRIGINE 450 MG: 100 TABLET ORAL at 20:43

## 2023-10-04 RX ADMIN — CARBAMAZEPINE 200 MG: 100 TABLET, CHEWABLE ORAL at 17:24

## 2023-10-04 RX ADMIN — INSULIN GLARGINE 14 UNITS: 100 INJECTION, SOLUTION SUBCUTANEOUS at 20:44

## 2023-10-04 RX ADMIN — SODIUM CHLORIDE, PRESERVATIVE FREE 10 ML: 5 INJECTION INTRAVENOUS at 08:58

## 2023-10-04 RX ADMIN — PANTOPRAZOLE SODIUM 40 MG: 40 INJECTION, POWDER, FOR SOLUTION INTRAVENOUS at 08:56

## 2023-10-04 RX ADMIN — INSULIN LISPRO 4 UNITS: 100 INJECTION, SOLUTION INTRAVENOUS; SUBCUTANEOUS at 08:56

## 2023-10-04 RX ADMIN — LEVOTHYROXINE SODIUM 75 MCG: 0.03 TABLET ORAL at 05:56

## 2023-10-04 RX ADMIN — CARBAMAZEPINE 200 MG: 100 TABLET, CHEWABLE ORAL at 08:55

## 2023-10-04 RX ADMIN — SODIUM CHLORIDE, PRESERVATIVE FREE 10 ML: 5 INJECTION INTRAVENOUS at 20:43

## 2023-10-04 RX ADMIN — ACETAMINOPHEN 650 MG: 325 TABLET ORAL at 00:04

## 2023-10-04 RX ADMIN — ASPIRIN 81 MG: 81 TABLET, CHEWABLE ORAL at 08:56

## 2023-10-04 RX ADMIN — LAMOTRIGINE 400 MG: 100 TABLET ORAL at 08:55

## 2023-10-04 RX ADMIN — ACETAMINOPHEN 650 MG: 325 TABLET ORAL at 17:22

## 2023-10-04 RX ADMIN — DOCUSATE SODIUM 100 MG: 100 CAPSULE, LIQUID FILLED ORAL at 08:56

## 2023-10-04 RX ADMIN — SIMETHICONE 80 MG: 80 TABLET, CHEWABLE ORAL at 13:14

## 2023-10-04 ASSESSMENT — PAIN DESCRIPTION - ORIENTATION
ORIENTATION: LEFT
ORIENTATION: LEFT

## 2023-10-04 ASSESSMENT — PAIN DESCRIPTION - LOCATION
LOCATION: LEG
LOCATION: HIP

## 2023-10-04 ASSESSMENT — PAIN DESCRIPTION - DESCRIPTORS
DESCRIPTORS: ACHING
DESCRIPTORS: ACHING;SORE

## 2023-10-04 ASSESSMENT — PAIN SCALES - GENERAL: PAINLEVEL_OUTOF10: 7

## 2023-10-04 NOTE — CARE COORDINATION
Writer spoke with Hilda Crawford (Caretaker), and they have found him placement in [facility], she did not have all the details, and will call me back with the details. Will need transport as well so trying to figure that out as well. DCP will continue to follow.      ADDENDUM  Address is [address] [facility], [address]

## 2023-10-04 NOTE — PROGRESS NOTES
1420: Rapid response called, Pt minimally responsive to painful stimuli. 1425: MD came to bedside, pt is now more alert and responsive to touch. No new orders at this time. Vital signs stable.

## 2023-10-04 NOTE — PROGRESS NOTES
nonresponsive. Medications:   Medications:    insulin glargine  14 Units SubCUTAneous Nightly    enoxaparin  40 mg SubCUTAneous Daily    carBAMazepine  200 mg Oral 4x Daily    pantoprazole  40 mg IntraVENous Daily    acetaminophen  650 mg Oral Q6H    insulin lispro  0.08 Units/kg SubCUTAneous TID WC    insulin lispro  0-8 Units SubCUTAneous TID WC    insulin lispro  0-4 Units SubCUTAneous Nightly    sodium chloride flush  5-40 mL IntraVENous 2 times per day    aspirin  81 mg Oral Daily    [Held by provider] carBAMazepine  400 mg Oral BID    docusate sodium  100 mg Oral BID    gabapentin  300 mg Oral 4x Daily    lamoTRIgine  400 mg Oral QAM    lamoTRIgine  450 mg Oral Nightly    levothyroxine  75 mcg Oral Daily    sertraline  50 mg Oral Nightly    vitamin D3  400 Units Oral Daily    zonisamide  400 mg Oral QHS    simethicone  80 mg Oral TID RT      Infusions:    sodium chloride 25 mL/hr (10/01/23 1012)    dextrose       PRN Meds: oxyCODONE, 5 mg, Q4H PRN  sodium chloride, , PRN  ondansetron, 4 mg, Q8H PRN   Or  ondansetron, 4 mg, Q6H PRN  sodium chloride flush, 5-40 mL, PRN  acetaminophen, 650 mg, Q6H PRN   Or  acetaminophen, 650 mg, Q6H PRN  bisacodyl, 10 mg, BID PRN  polyethylene glycol, 17 g, Daily PRN  glucose, 4 tablet, PRN  dextrose bolus, 125 mL, PRN   Or  dextrose bolus, 250 mL, PRN  glucagon (rDNA), 1 mg, PRN  dextrose, , Continuous PRN        Labs and Imaging   XR CHEST PORTABLE    Result Date: 9/29/2023  EXAMINATION: ONE XRAY VIEW OF THE CHEST 9/29/2023 1:49 pm COMPARISON: 09/28/2023 HISTORY: ORDERING SYSTEM PROVIDED HISTORY: aspiration TECHNOLOGIST PROVIDED HISTORY: Reason for exam:->aspiration Reason for Exam: aspiration FINDINGS: Heart size is stable. Mediastinal contours are stable. Pulmonary vascularity is stable. Gaseous distention is seen in the upper abdomen, moderate in degree, with adjacent left basilar opacity, increased compared to prior No focal consolidation on the right.      Left lower 03:25 PM    PHUR 5.0 04/28/2022 03:25 PM    LABCAST 0-1 08/08/2012 08:17 PM    WBCUA 0-2 04/28/2022 03:25 PM    RBCUA 3-4 04/28/2022 03:25 PM    MUCUS Rare 12/10/2018 05:50 PM    BACTERIA Rare 12/10/2018 05:50 PM    CLARITYU Clear 04/28/2022 03:25 PM    SPECGRAV >=1.030 04/28/2022 03:25 PM    LEUKOCYTESUR Negative 04/28/2022 03:25 PM    UROBILINOGEN 0.2 04/28/2022 03:25 PM    BILIRUBINUR Negative 04/28/2022 03:25 PM    BILIRUBINUR NEG 08/08/2012 08:29 PM    BILIRUBINUR NEGATIVE 04/25/2012 02:16 PM    BLOODU TRACE-INTACT 04/28/2022 03:25 PM    GLUCOSEU Negative 04/28/2022 03:25 PM    GLUCOSEU >=1000 04/25/2012 02:16 PM    KETUA Negative 04/28/2022 03:25 PM    AMORPHOUS 3+ 12/10/2018 05:50 PM     Urine Cultures:   Lab Results   Component Value Date/Time    LABURIN No growth at 18-36 hours 12/10/2018 05:47 PM     Blood Cultures:   Lab Results   Component Value Date/Time    BC No Growth after 4 days of incubation. 03/23/2020 01:35 PM     Lab Results   Component Value Date/Time    BLOODCULT2 No Growth after 4 days of incubation.  03/23/2020 01:35 PM     Organism: No results found for: \"ORG\"      Electronically signed by Pietro Escamilla DO on 10/4/2023 at 11:04 AM

## 2023-10-04 NOTE — PROGRESS NOTES
Occupational Therapy    Attempted OT treatment session. Pt unresponsive to tactile, verbal stimuli, and PROM of BUE; nurse notified. Pt unresponsive to painful stimuli by nurse and rapid response called. Pt not appropriate for therapy this date. Will re-attempt as schedule allows and pt is medically appropriate    No charge.     Ana Underwood OTR/L

## 2023-10-04 NOTE — PROGRESS NOTES
Physical Therapy  Facility/Department: [facility] C5 - MED SURG/ORTHO  Daily Treatment Note  NAME: Dhaval Peres  : 1975  MRN: [id number]    Date of Service: 10/4/2023    Discharge Recommendations:  Subacute/Skilled Nursing Facility   PT Equipment Recommendations  Equipment Needed: No     Barriers to Home Discharge:   [] Steps to access home entry or bed/bath:   [x] Unable to transfer, ambulate, or propel wheelchair household distances without assist   [x] Limited available assist at home upon discharge    [] Patient or family requests d/c to post-acute facility    [x] Poor cognition/safety awareness for d/c to home alone    [x] Unable to maintain ordered weight bearing status    [x] Patient with salient signs of long-standing immobility   [x] Decreased independence with ADLs   [x] Increased risk for falls   [] Other:     Patient Diagnosis(es): The encounter diagnosis was Closed fracture of shaft of left femur, unspecified fracture morphology, initial encounter ([address]). Assessment   Assessment: Pt continues to function below baseline, minimally responsive to therapy session. PT performed B PROM BLEs. Pt would benefit from continued skilled PT to address deficits and maximize outcomes. Recommend SNF upon d/c  Activity Tolerance: Treatment limited secondary to decreased cognition;Patient limited by fatigue     Plan    Physcial Therapy Plan  General Plan: 5-7 times per week  Current Treatment Recommendations: Strengthening;Transfer training;Functional mobility training;Balance training;Gait training; Wheelchair mobility training; Endurance training;Home exercise program;Patient/Caregiver education & training; Safety education & training;Positioning; Therapeutic activities     Restrictions  Restrictions/Precautions  Restrictions/Precautions: Fall Risk, Weight Bearing, Swallowing - Thickened Liquids  Lower Extremity Weight Bearing Restrictions  Left Lower Extremity Weight Bearing: Partial Weight Bearing  Partial Weight Bearing Percentage Or Pounds: 50% PWB on LLE  Position Activity Restriction  Other position/activity restrictions: Avasys, non-verbal, legally blind, purewick, tele     Subjective    Subjective  Subjective: pt supine in bed upon arrival, sitter present, pt remains with eyes closed  Pain: pt not resposive to light sternal rub  but was seesn to grimmace and turn his head afterward  Orientation  Orientation Level: Unable to assess  Cognition  Cognition Comment: Inability to follow directions or communicate needs d/t hx of hearing and visual deficits     Objective   Vitals  BP: (!) 94/59  BP Location: Left upper arm  MAP (Calculated): 71  SpO2: 98 %  O2 Device: None (Room air)  Bed Mobility Training  Bed Mobility Training:  (MAKENZIE: rapid response called by RN during treatment - for possible seizure)     PT Exercises  PROM Exercises: Completed x8-10 reps of bilateral ankle, knee and hip PROM. No grimicing noted during     Safety Devices  Type of Devices: All marco antonio prominences offloaded;Nurse notified; Bed alarm in place;Call light within reach; Left in bed;Sitter present; All fall risk precautions in place (RR team in room EOS)       Goals  Short Term Goals  Time Frame for Short Term Goals: 7 days: 10/6/2023 (unless otherwise noted)--10/03 All goals continue  Short Term Goal 1: Pt will be able to complete bed mobility with maxA x1 to decrease risk for pressure ulcers. Short Term Goal 2: Pt will be able to sit EOB x5 minutes with maxAx1 to decrease risk for pressure ulcers. Short Term Goal 3: Pt will be able to complete BLE strengthening/ROM with modA (10/3/2023)  Patient Goals   Patient Goals : Unable to verbalize goals    Education  Patient Education  Education Given To: Patient  Education Provided: Role of Therapy;Plan of Care  Education Provided Comments: Educated on role of PT, positioning techniques  Education Method: Verbal;Demonstration  Barriers to Learning: Cognition; Hearing;Vision  Education Outcome: Unable to

## 2023-10-05 ENCOUNTER — TELEPHONE (OUTPATIENT)
Dept: ORTHOPEDIC SURGERY | Age: 48
End: 2023-10-05

## 2023-10-05 VITALS
TEMPERATURE: 97.6 F | OXYGEN SATURATION: 98 % | HEART RATE: 81 BPM | RESPIRATION RATE: 15 BRPM | DIASTOLIC BLOOD PRESSURE: 62 MMHG | SYSTOLIC BLOOD PRESSURE: 92 MMHG | HEIGHT: 67 IN | BODY MASS INDEX: 19.2 KG/M2 | WEIGHT: 122.36 LBS

## 2023-10-05 LAB
ANION GAP SERPL CALCULATED.3IONS-SCNC: 8 MMOL/L (ref 3–16)
BASOPHILS # BLD: 0 K/UL (ref 0–0.2)
BASOPHILS NFR BLD: 0.5 %
BUN SERPL-MCNC: 15 MG/DL (ref 7–20)
CALCIUM SERPL-MCNC: 8.8 MG/DL (ref 8.3–10.6)
CHLORIDE SERPL-SCNC: 104 MMOL/L (ref 99–110)
CO2 SERPL-SCNC: 25 MMOL/L (ref 21–32)
CREAT SERPL-MCNC: 0.9 MG/DL (ref 0.9–1.3)
DEPRECATED RDW RBC AUTO: 14.9 % (ref 12.4–15.4)
EOSINOPHIL # BLD: 0.1 K/UL (ref 0–0.6)
EOSINOPHIL NFR BLD: 2.6 %
GFR SERPLBLD CREATININE-BSD FMLA CKD-EPI: >60 ML/MIN/{1.73_M2}
GLUCOSE BLD-MCNC: 177 MG/DL (ref 70–99)
GLUCOSE BLD-MCNC: 269 MG/DL (ref 70–99)
GLUCOSE BLD-MCNC: 273 MG/DL (ref 70–99)
GLUCOSE SERPL-MCNC: 296 MG/DL (ref 70–99)
HCT VFR BLD AUTO: 24.8 % (ref 40.5–52.5)
HGB BLD-MCNC: 8.3 G/DL (ref 13.5–17.5)
LYMPHOCYTES # BLD: 1.6 K/UL (ref 1–5.1)
LYMPHOCYTES NFR BLD: 31.3 %
MCH RBC QN AUTO: 30.2 PG (ref 26–34)
MCHC RBC AUTO-ENTMCNC: 33.5 G/DL (ref 31–36)
MCV RBC AUTO: 90 FL (ref 80–100)
MONOCYTES # BLD: 0.3 K/UL (ref 0–1.3)
MONOCYTES NFR BLD: 6 %
NEUTROPHILS # BLD: 3 K/UL (ref 1.7–7.7)
NEUTROPHILS NFR BLD: 59.6 %
PERFORMED ON: ABNORMAL
PLATELET # BLD AUTO: 278 K/UL (ref 135–450)
PMV BLD AUTO: 6.5 FL (ref 5–10.5)
POTASSIUM SERPL-SCNC: 4.1 MMOL/L (ref 3.5–5.1)
RBC # BLD AUTO: 2.75 M/UL (ref 4.2–5.9)
SODIUM SERPL-SCNC: 137 MMOL/L (ref 136–145)
WBC # BLD AUTO: 5 K/UL (ref 4–11)

## 2023-10-05 PROCEDURE — 6360000002 HC RX W HCPCS: Performed by: SURGERY

## 2023-10-05 PROCEDURE — 6370000000 HC RX 637 (ALT 250 FOR IP): Performed by: SPECIALIST/TECHNOLOGIST

## 2023-10-05 PROCEDURE — 6370000000 HC RX 637 (ALT 250 FOR IP)

## 2023-10-05 PROCEDURE — C9113 INJ PANTOPRAZOLE SODIUM, VIA: HCPCS | Performed by: SURGERY

## 2023-10-05 PROCEDURE — 6360000002 HC RX W HCPCS: Performed by: ORTHOPAEDIC SURGERY

## 2023-10-05 PROCEDURE — 80048 BASIC METABOLIC PNL TOTAL CA: CPT

## 2023-10-05 PROCEDURE — 36415 COLL VENOUS BLD VENIPUNCTURE: CPT

## 2023-10-05 PROCEDURE — 6370000000 HC RX 637 (ALT 250 FOR IP): Performed by: NURSE PRACTITIONER

## 2023-10-05 PROCEDURE — 85025 COMPLETE CBC W/AUTO DIFF WBC: CPT

## 2023-10-05 PROCEDURE — 2580000003 HC RX 258: Performed by: ORTHOPAEDIC SURGERY

## 2023-10-05 PROCEDURE — 6370000000 HC RX 637 (ALT 250 FOR IP): Performed by: ORTHOPAEDIC SURGERY

## 2023-10-05 RX ADMIN — INSULIN LISPRO 4 UNITS: 100 INJECTION, SOLUTION INTRAVENOUS; SUBCUTANEOUS at 09:55

## 2023-10-05 RX ADMIN — INSULIN LISPRO 4 UNITS: 100 INJECTION, SOLUTION INTRAVENOUS; SUBCUTANEOUS at 09:54

## 2023-10-05 RX ADMIN — PANTOPRAZOLE SODIUM 40 MG: 40 INJECTION, POWDER, FOR SOLUTION INTRAVENOUS at 10:01

## 2023-10-05 RX ADMIN — SIMETHICONE 80 MG: 80 TABLET, CHEWABLE ORAL at 09:49

## 2023-10-05 RX ADMIN — ACETAMINOPHEN 650 MG: 325 TABLET ORAL at 06:11

## 2023-10-05 RX ADMIN — CARBAMAZEPINE 200 MG: 100 TABLET, CHEWABLE ORAL at 09:49

## 2023-10-05 RX ADMIN — GABAPENTIN 300 MG: 300 CAPSULE ORAL at 09:49

## 2023-10-05 RX ADMIN — SODIUM CHLORIDE, PRESERVATIVE FREE 10 ML: 5 INJECTION INTRAVENOUS at 09:47

## 2023-10-05 RX ADMIN — OXYCODONE HYDROCHLORIDE 5 MG: 5 TABLET ORAL at 02:51

## 2023-10-05 RX ADMIN — ENOXAPARIN SODIUM 40 MG: 100 INJECTION SUBCUTANEOUS at 09:47

## 2023-10-05 RX ADMIN — LEVOTHYROXINE SODIUM 75 MCG: 0.03 TABLET ORAL at 06:11

## 2023-10-05 RX ADMIN — CHOLECALCIFEROL (VITAMIN D3) 10 MCG (400 UNIT) TABLET 400 UNITS: at 09:48

## 2023-10-05 RX ADMIN — ASPIRIN 81 MG: 81 TABLET, CHEWABLE ORAL at 09:49

## 2023-10-05 RX ADMIN — LAMOTRIGINE 400 MG: 100 TABLET ORAL at 09:48

## 2023-10-05 ASSESSMENT — PAIN SCALES - GENERAL
PAINLEVEL_OUTOF10: 0
PAINLEVEL_OUTOF10: 7
PAINLEVEL_OUTOF10: 0

## 2023-10-05 ASSESSMENT — PAIN DESCRIPTION - DESCRIPTORS: DESCRIPTORS: ACHING

## 2023-10-05 ASSESSMENT — PAIN DESCRIPTION - ORIENTATION: ORIENTATION: LEFT

## 2023-10-05 ASSESSMENT — PAIN SCALES - WONG BAKER: WONGBAKER_NUMERICALRESPONSE: 0

## 2023-10-05 ASSESSMENT — PAIN DESCRIPTION - LOCATION: LOCATION: HIP

## 2023-10-05 ASSESSMENT — PAIN - FUNCTIONAL ASSESSMENT: PAIN_FUNCTIONAL_ASSESSMENT: ACTIVITIES ARE NOT PREVENTED

## 2023-10-05 NOTE — TELEPHONE ENCOUNTER
Medical Facility Question     Facility Name: [facility] Name: [address] [place] [address] Number: [phone]  Request or Information: NEEDING TO SCHEDULE PATIENT FOR 1ST POST OP APPOINTMENT. NOTHING AVAILABLE WITHIN THE 2 WEEK SPAN. THIS IS FOR SUTURE REMOVAL.  PLS CALL

## 2023-10-05 NOTE — CARE COORDINATION
Writer spoke with Cheng Parsons @ [facility] for [address] [facility], they are able to accept. ADDENDUM  Writer received call from [facility], and they are arranging all therapy needs for patient.      Yessenia Huitron RN

## 2023-10-05 NOTE — PROGRESS NOTES
[facility] ambulance given discharge instructions and paper prescriptions. All questions and concerns were addressed. Patient dressing clean dry and intact. IV removed without complications. Patient wheeled to transport [place] with all patient belongings.

## 2023-10-05 NOTE — CARE COORDINATION
CASE MANAGEMENT DISCHARGE SUMMARY      Discharge to: [address] [facility], [address]    Precertification completed: [id number] Exemption Notification (HENS) completed: N/A    IMM given: (date) [address] ordered/agency: N/A    Transportation: [employer]      Medical Transport explained to [employer]. Pt/family voice no agency preference. Agency used: [facility]   time: 1300   Ambulance form completed: Yes    Confirmed discharge plan with:     Patient: yes     Family:  yes    Name: Kelsy Seek number: [phone]       RN, name: [facility]    Note: Discharging nurse to complete HUSEYIN, reconcile AVS, and place final copy with patient's discharge packet. RN to ensure that written prescriptions for  Level II medications are sent with patient to the facility as per protocol.     Adali Banks RN

## 2023-10-18 ENCOUNTER — OFFICE VISIT (OUTPATIENT)
Dept: ORTHOPEDIC SURGERY | Age: 48
End: 2023-10-18

## 2023-10-18 VITALS — WEIGHT: 122.36 LBS | HEIGHT: 67 IN | BODY MASS INDEX: 19.2 KG/M2

## 2023-10-18 DIAGNOSIS — Z47.89 ORTHOPEDIC AFTERCARE: Primary | ICD-10-CM

## 2023-10-18 NOTE — PROGRESS NOTES
ORTHOPAEDIC SURGERY FOLLOWUP VISIT    CHIEF COMPLAINT: Follow-up left femur    DATE OF INJURY: 9/28/2023  DIAGNOSIS: Left subtrochanteric femur fracture  DATE OF SURGERY: 9/28/2023 intramedullary nailing left segmental subtrochanteric femur fracture    HISTORY OF PRESENT ILLNESS:  51-year-old male presents for repeat evaluation postop 3 weeks from left femur cephalomedullary nail fixation with cerclage cables. Overall his pain is rated 0 out of 10 today. He has been hesitant to perform weightbearing on that extremity. He has not had any wound healing issues. He has been picking at his dressings. No significant drainage from incisional sites. PHYSICAL EXAM:  General: Well-appearing. No distress. Left lower extremity: Incisional sites are well approximated with skin staples. There is mature healing. There is no signs of dehiscence. No open areas. No surrounding erythema. Minimal shadowing on the central incision. Limb length and rotational alignment is appropriate. Hip/knee range of motion was not assessed today. Sensation is intact to light touch in deep peroneal, superficial peroneal, tibial, sural, and saphenous nerve distributions. Motor function is intact to EHL, FHL, tibialis anterior, and gastroc. There is brisk capillary refill to the toes and a strong palpable dorsalis pedis pulse. Compartments are soft and compressible. There is no calf tenderness and a negative Homans' sign. RADIOGRAPHIC EXAM:  AP and lateral projections of the left femur demonstrate anatomic positioned subtrochanteric femur fracture with cerclage cables. There is excellent positioning of cephalomedullary implant. No significant evidence of periosteal callus at this time point.     ASSESSMENT AND PLAN:  3 weeks status post left femur open reduction and cerclage cabling, cephalomedullary nailing    50% partial weightbearing left lower extremity   May weight-bear to assist with transfers  Assist devices as

## 2023-11-29 ENCOUNTER — HOSPITAL ENCOUNTER (OUTPATIENT)
Age: 48
Discharge: HOME | End: 2023-11-29
Payer: MEDICAID

## 2023-11-29 LAB
ALBUMIN SERPL-MCNC: 4 G/DL (ref 3.4–5)
ALBUMIN/GLOB SERPL: 1.1 {RATIO} (ref 1.1–2.2)
ALP SERPL-CCNC: 331 U/L (ref 40–129)
ALT SERPL-CCNC: 11 U/L (ref 10–40)
ANION GAP SERPL CALCULATED.3IONS-SCNC: 15 MMOL/L (ref 3–16)
AST SERPL-CCNC: 12 U/L (ref 15–37)
BASOPHILS # BLD: 0 K/UL (ref 0–0.2)
BASOPHILS NFR BLD: 0.4 %
BILIRUB SERPL-MCNC: <0.2 MG/DL (ref 0–1)
BUN SERPL-MCNC: 17 MG/DL (ref 7–20)
CALCIUM SERPL-MCNC: 9.3 MG/DL (ref 8.3–10.6)
CHLORIDE SERPL-SCNC: 97 MMOL/L (ref 99–110)
CO2 SERPL-SCNC: 20 MMOL/L (ref 21–32)
CREAT SERPL-MCNC: 1 MG/DL (ref 0.9–1.3)
DEPRECATED RDW RBC AUTO: 15.8 % (ref 12.4–15.4)
EOSINOPHIL # BLD: 0.1 K/UL (ref 0–0.6)
EOSINOPHIL NFR BLD: 1.6 %
GFR SERPLBLD CREATININE-BSD FMLA CKD-EPI: >60 ML/MIN/{1.73_M2}
GLUCOSE SERPL-MCNC: 198 MG/DL (ref 70–99)
HCT VFR BLD AUTO: 43.4 % (ref 40.5–52.5)
HGB BLD-MCNC: 14 G/DL (ref 13.5–17.5)
LYMPHOCYTES # BLD: 2.2 K/UL (ref 1–5.1)
LYMPHOCYTES NFR BLD: 37.9 %
MCH RBC QN AUTO: 28.4 PG (ref 26–34)
MCHC RBC AUTO-ENTMCNC: 32.1 G/DL (ref 31–36)
MCV RBC AUTO: 88.3 FL (ref 80–100)
MONOCYTES # BLD: 0.4 K/UL (ref 0–1.3)
MONOCYTES NFR BLD: 6.9 %
NEUTROPHILS # BLD: 3.1 K/UL (ref 1.7–7.7)
NEUTROPHILS NFR BLD: 53.2 %
PLATELET # BLD AUTO: 272 K/UL (ref 135–450)
PMV BLD AUTO: 6.9 FL (ref 5–10.5)
POTASSIUM SERPL-SCNC: 4.3 MMOL/L (ref 3.5–5.1)
PROT SERPL-MCNC: 7.8 G/DL (ref 6.4–8.2)
RBC # BLD AUTO: 4.92 M/UL (ref 4.2–5.9)
SODIUM SERPL-SCNC: 132 MMOL/L (ref 136–145)
WBC # BLD AUTO: 5.9 K/UL (ref 4–11)

## 2023-11-29 PROCEDURE — 80053 COMPREHEN METABOLIC PANEL: CPT

## 2023-11-29 PROCEDURE — 83036 HEMOGLOBIN GLYCOSYLATED A1C: CPT

## 2023-11-29 PROCEDURE — 36415 COLL VENOUS BLD VENIPUNCTURE: CPT

## 2023-11-29 PROCEDURE — 85025 COMPLETE CBC W/AUTO DIFF WBC: CPT

## 2023-11-30 LAB
EST. AVERAGE GLUCOSE BLD GHB EST-MCNC: 145.6 MG/DL
HBA1C MFR BLD: 6.7 %
REASON FOR REJECTION: NORMAL
REJECTED TEST: NORMAL

## (undated) DEVICE — BOWL MED L 32OZ PLAS W/ MOLD GRAD EZ OPN PEEL PCH

## (undated) DEVICE — Device

## (undated) DEVICE — GUIDEWIRE ORTH L400MM DIA3.2MM FOR TFN

## (undated) DEVICE — SYRINGE IRRIG 60ML SFT PLIABLE BLB EZ TO GRP 1 HND USE W/

## (undated) DEVICE — SUTURE VCRL + SZ 2-0 L36IN ABSRB UD L36MM CT-1 1/2 CIR VCP945H

## (undated) DEVICE — DRESSING FOAM W4XL4IN SIL FACE BORD ADH PD SUP ABSRB COR

## (undated) DEVICE — DRESSING FOAM W3XL3IN GENTLE SIL FACE BORD ADH PD SUP ABSRB

## (undated) DEVICE — DRESSING ALG W4XL8IN AG FOAM SUPERABSORBENT SIL ANTIMIC

## (undated) DEVICE — PACK PROCEDURE SURG HIP PIN TROCHANTERIC FEM NAIL CDS

## (undated) DEVICE — STAPLER EXT SKIN 35 WIDE S STL STPL SQUEEZE HNDL VISISTAT

## (undated) DEVICE — 6619 2 PTNT ISO SYS INCISE AREA&LT;(&GT;&&LT;)&GT;P: Brand: STERI-DRAPE™ IOBAN™ 2

## (undated) DEVICE — SUTURE VCRL SZ 0 L36IN ABSRB UD CT-1 L36MM 1/2 CIR TAPR PNT VCP946H

## (undated) DEVICE — COVER LT HNDL PLAS RIG 2 PER PK

## (undated) DEVICE — GOWN SIRUS NONREIN XL W/TWL: Brand: MEDLINE INDUSTRIES, INC.

## (undated) DEVICE — DRAPE,UTILITY,TAPE,15X26,STERILE: Brand: MEDLINE

## (undated) DEVICE — SOLUTION IV IRRIG 500ML 0.9% SODIUM CHL 2F7123

## (undated) DEVICE — GLOVE ORTHO 7   MSG9470

## (undated) DEVICE — BIT DRL L145MM DIA4.2MM NONSTERILE 3 FLUT NDL PNT QUIK CPL

## (undated) DEVICE — GLOVE SURG SZ 75 L12IN FNGR THK79MIL GRN LTX FREE